# Patient Record
Sex: MALE | Race: WHITE | Employment: OTHER | URBAN - METROPOLITAN AREA
[De-identification: names, ages, dates, MRNs, and addresses within clinical notes are randomized per-mention and may not be internally consistent; named-entity substitution may affect disease eponyms.]

---

## 2017-03-16 ENCOUNTER — TELEPHONE (OUTPATIENT)
Dept: PULMONOLOGY | Age: 57
End: 2017-03-16

## 2017-03-16 ENCOUNTER — OFFICE VISIT (OUTPATIENT)
Dept: PULMONOLOGY | Age: 57
End: 2017-03-16

## 2017-03-16 ENCOUNTER — HOSPITAL ENCOUNTER (OUTPATIENT)
Dept: OTHER | Age: 57
Discharge: HOME | End: 2017-03-16
Attending: INTERNAL MEDICINE | Admitting: INTERNAL MEDICINE

## 2017-03-16 VITALS
WEIGHT: 238 LBS | RESPIRATION RATE: 16 BRPM | SYSTOLIC BLOOD PRESSURE: 110 MMHG | BODY MASS INDEX: 34.07 KG/M2 | OXYGEN SATURATION: 98 % | HEART RATE: 78 BPM | TEMPERATURE: 98 F | DIASTOLIC BLOOD PRESSURE: 62 MMHG | HEIGHT: 70 IN

## 2017-03-16 DIAGNOSIS — Z22.7 TB LUNG, LATENT: ICD-10-CM

## 2017-03-16 DIAGNOSIS — J86.9 EMPYEMA (HCC): ICD-10-CM

## 2017-03-16 DIAGNOSIS — J90 PLEURAL EFFUSION: ICD-10-CM

## 2017-03-16 DIAGNOSIS — I27.20 PULMONARY HTN (HCC): ICD-10-CM

## 2017-03-16 DIAGNOSIS — R93.89 ABNORMAL CXR: Primary | ICD-10-CM

## 2017-03-16 PROCEDURE — 99214 OFFICE O/P EST MOD 30 MIN: CPT | Performed by: INTERNAL MEDICINE

## 2017-03-16 PROCEDURE — 3017F COLORECTAL CA SCREEN DOC REV: CPT | Performed by: INTERNAL MEDICINE

## 2017-03-16 PROCEDURE — G8427 DOCREV CUR MEDS BY ELIG CLIN: HCPCS | Performed by: INTERNAL MEDICINE

## 2017-03-16 PROCEDURE — 1036F TOBACCO NON-USER: CPT | Performed by: INTERNAL MEDICINE

## 2017-03-16 PROCEDURE — G8484 FLU IMMUNIZE NO ADMIN: HCPCS | Performed by: INTERNAL MEDICINE

## 2017-03-16 PROCEDURE — G8417 CALC BMI ABV UP PARAM F/U: HCPCS | Performed by: INTERNAL MEDICINE

## 2017-04-04 ENCOUNTER — TELEPHONE (OUTPATIENT)
Dept: PULMONOLOGY | Age: 57
End: 2017-04-04

## 2017-04-04 DIAGNOSIS — J86.9 EMPYEMA OF RIGHT PLEURAL SPACE (HCC): Primary | ICD-10-CM

## 2017-04-05 ENCOUNTER — TELEPHONE (OUTPATIENT)
Dept: PULMONOLOGY | Age: 57
End: 2017-04-05

## 2017-04-05 ENCOUNTER — HOSPITAL ENCOUNTER (OUTPATIENT)
Dept: PULMONOLOGY | Age: 57
Discharge: HOME | End: 2017-04-05
Attending: INTERNAL MEDICINE | Admitting: INTERNAL MEDICINE

## 2017-04-05 DIAGNOSIS — J86.9 PYOTHORAX WITHOUT FISTULA (HCC): ICD-10-CM

## 2017-04-06 ENCOUNTER — TELEPHONE (OUTPATIENT)
Dept: PULMONOLOGY | Age: 57
End: 2017-04-06

## 2017-04-06 DIAGNOSIS — R09.02 HYPOXIA: Primary | ICD-10-CM

## 2017-06-27 ENCOUNTER — TELEPHONE (OUTPATIENT)
Dept: PULMONOLOGY | Age: 57
End: 2017-06-27

## 2017-10-12 ENCOUNTER — TELEPHONE (OUTPATIENT)
Dept: PULMONOLOGY | Age: 57
End: 2017-10-12

## 2017-10-12 NOTE — TELEPHONE ENCOUNTER
Patient did not show for 3 month fua  with  on 10/12/17      Patient was also no show on: 6/9/16 and 2/4/16    LOV:   Assessment:3/16/17       · Abnormal CXR- favor atelectasis and pleuroparenchymal changes related to prior infection  · MSSA Empyema required chest tube 2/13/15-2/25/2015 and DNase/tPA complicated by Hemothorax and mucus plug lung collapse. Recurrence required CT placement 3/19/2015. Declined for decortication by CT surgery. Completed 6 months of Abx 4/2016. Improved radiographically and clinically. · Left sided effusion on CT chest 12/13/2015. Thoracentesis 12/28/2015 500 cc. Negative Cx P 2.5 LD 62 57% Neutrophils. Negative cytology and flow cytometry. Likely was volume overlaod  · Latent TB- Completed 4 drugs TB treatment 1/2/2013 with the thought that the pleural effusion is TB effusion. · Recurrent lymphocytic right pleural effusion. Completed 4 drug TB treatment. VATS pleurodesis with pleural biopsy 2/18/13 negative for malignancy, dense fibrosis with chronic inflammation  · ESRD on HD via right arm fistula. · Moderate MR, mild to moderate TR  · Atrial fibrillation  · Pulmonary hypertension.                            Plan:       · CXR PA and lateral today  · 6MW  · ONPO    · Albuterol 2 puffs Q4-6 hrs PRN

## 2017-11-04 ENCOUNTER — HOSPITAL ENCOUNTER (OUTPATIENT)
Dept: OTHER | Age: 57
Discharge: HOME | End: 2017-11-04
Attending: NURSE PRACTITIONER | Admitting: NURSE PRACTITIONER

## 2017-11-04 DIAGNOSIS — R05.9 COUGH: ICD-10-CM

## 2017-11-27 ENCOUNTER — HOSPITAL ENCOUNTER (OUTPATIENT)
Dept: OTHER | Age: 57
Discharge: HOME | End: 2017-11-27
Attending: NURSE PRACTITIONER | Admitting: NURSE PRACTITIONER

## 2017-11-27 DIAGNOSIS — R09.89 ABNORMAL LUNG SOUNDS: ICD-10-CM

## 2017-12-30 PROBLEM — I48.91 A-FIB (HCC): Status: ACTIVE | Noted: 2017-12-30

## 2017-12-30 PROBLEM — E11.9 DM (DIABETES MELLITUS) (HCC): Status: ACTIVE | Noted: 2017-12-30

## 2018-01-01 PROBLEM — I48.21 PERMANENT ATRIAL FIBRILLATION (HCC): Status: ACTIVE | Noted: 2017-12-30

## 2018-01-02 ENCOUNTER — TELEPHONE (OUTPATIENT)
Dept: PULMONOLOGY | Age: 58
End: 2018-01-02

## 2018-01-05 ENCOUNTER — TELEPHONE (OUTPATIENT)
Dept: CARDIOLOGY CLINIC | Age: 58
End: 2018-01-05

## 2018-01-05 RX ORDER — LIDOCAINE HYDROCHLORIDE 10 MG/ML
1 INJECTION, SOLUTION EPIDURAL; INFILTRATION; INTRACAUDAL; PERINEURAL
Status: CANCELLED | OUTPATIENT
Start: 2018-01-05 | End: 2018-01-05

## 2018-01-05 RX ORDER — SODIUM CHLORIDE, SODIUM LACTATE, POTASSIUM CHLORIDE, CALCIUM CHLORIDE 600; 310; 30; 20 MG/100ML; MG/100ML; MG/100ML; MG/100ML
INJECTION, SOLUTION INTRAVENOUS CONTINUOUS
Status: CANCELLED | OUTPATIENT
Start: 2018-01-05

## 2018-01-05 NOTE — TELEPHONE ENCOUNTER
Patient is scheduled with Dr. Pito Gaitan for SWETHA w Anesthesia Jim Griggs) [place] [facility] 9:30am to the Cath Lab. Please have patient arrive to the main entrance of [facility] and check in with the registration desk. Medications ok per NPBB. Confirmed transportation with Zach Sharp for patient from [facility] to [facility].

## 2018-01-08 ENCOUNTER — HOSPITAL ENCOUNTER (OUTPATIENT)
Dept: SURGERY | Age: 58
Discharge: HOME | End: 2018-01-09

## 2018-01-22 ENCOUNTER — TELEPHONE (OUTPATIENT)
Dept: CARDIOLOGY | Age: 58
End: 2018-01-22

## 2018-01-26 ENCOUNTER — OFFICE VISIT (OUTPATIENT)
Dept: CARDIOLOGY CLINIC | Age: 58
End: 2018-01-26

## 2018-01-26 VITALS
SYSTOLIC BLOOD PRESSURE: 110 MMHG | HEIGHT: 70 IN | WEIGHT: 239 LBS | HEART RATE: 104 BPM | OXYGEN SATURATION: 96 % | BODY MASS INDEX: 34.22 KG/M2 | DIASTOLIC BLOOD PRESSURE: 60 MMHG

## 2018-01-26 DIAGNOSIS — Z79.01 LONG TERM CURRENT USE OF ANTICOAGULANT: ICD-10-CM

## 2018-01-26 DIAGNOSIS — H54.50 BLINDNESS OF LEFT EYE WITH LOW VISION IN CONTRALATERAL EYE: ICD-10-CM

## 2018-01-26 DIAGNOSIS — H54.40 BLINDNESS OF LEFT EYE WITH LOW VISION IN CONTRALATERAL EYE: ICD-10-CM

## 2018-01-26 DIAGNOSIS — N18.6 ESRD (END STAGE RENAL DISEASE) (HCC): ICD-10-CM

## 2018-01-26 DIAGNOSIS — I48.3 TYPICAL ATRIAL FLUTTER (HCC): Primary | ICD-10-CM

## 2018-01-26 DIAGNOSIS — I48.21 PERMANENT ATRIAL FIBRILLATION (HCC): ICD-10-CM

## 2018-01-26 PROCEDURE — G8482 FLU IMMUNIZE ORDER/ADMIN: HCPCS | Performed by: INTERNAL MEDICINE

## 2018-01-26 PROCEDURE — G8417 CALC BMI ABV UP PARAM F/U: HCPCS | Performed by: INTERNAL MEDICINE

## 2018-01-26 PROCEDURE — G8427 DOCREV CUR MEDS BY ELIG CLIN: HCPCS | Performed by: INTERNAL MEDICINE

## 2018-01-26 PROCEDURE — 99215 OFFICE O/P EST HI 40 MIN: CPT | Performed by: INTERNAL MEDICINE

## 2018-01-26 PROCEDURE — 3017F COLORECTAL CA SCREEN DOC REV: CPT | Performed by: INTERNAL MEDICINE

## 2018-01-26 RX ORDER — ACETAMINOPHEN 325 MG/1
TABLET ORAL EVERY 6 HOURS PRN
Status: ON HOLD | COMMUNITY
End: 2018-06-06 | Stop reason: ALTCHOICE

## 2018-01-26 RX ORDER — BENZONATATE 100 MG/1
100 CAPSULE ORAL 3 TIMES DAILY PRN
Status: ON HOLD | COMMUNITY
End: 2018-06-06 | Stop reason: ALTCHOICE

## 2018-01-29 ENCOUNTER — TELEPHONE (OUTPATIENT)
Dept: CARDIOLOGY CLINIC | Age: 58
End: 2018-01-29

## 2018-01-30 NOTE — TELEPHONE ENCOUNTER
I just did a second opinion consult for jason. He is not my patient and sorry I cant take other physicians patient's for INR monitoring.

## 2018-02-01 DIAGNOSIS — I48.19 PERSISTENT ATRIAL FIBRILLATION (HCC): Primary | ICD-10-CM

## 2018-02-02 LAB — INR BLD: 1.4

## 2018-02-06 ENCOUNTER — ANTI-COAG VISIT (OUTPATIENT)
Dept: CARDIOLOGY CLINIC | Age: 58
End: 2018-02-06

## 2018-02-06 ENCOUNTER — TELEPHONE (OUTPATIENT)
Dept: CARDIOLOGY CLINIC | Age: 58
End: 2018-02-06

## 2018-02-06 NOTE — TELEPHONE ENCOUNTER
Spoke to [facility]. 02/06/18: INR is 1.6. Per protocol, dosing change is 7.5 mg Tue/Thu/Sat. Recheck Fri 02/09. HHN RN VU.  Provider notified. ~ Kevin Iraheta RN

## 2018-02-08 ENCOUNTER — TELEPHONE (OUTPATIENT)
Dept: CARDIOLOGY CLINIC | Age: 58
End: 2018-02-08

## 2018-02-08 NOTE — TELEPHONE ENCOUNTER
I have already clarified his meds with Ankur Nazario. The patients BP was WNL. His HR was 144 due to the patient not taking his diltiazem or other meds this AM. His girlfriend is helping him with his medications daily instead of HHN. Chase Zuñiga Everything else has been clarified. Ankur Nazario RN had the wrong information.

## 2018-02-08 NOTE — TELEPHONE ENCOUNTER
Occupational therapist Chiara Elena called states pt's HR has been 130-140s in afib. According to previous notes he did not take his diltiazem this AM prior to dialysis. Pt confirmed he took dilt at 11:00AM after dialysis. Pt denies symptoms. States he has been taking warfarin without missing.

## 2018-02-08 NOTE — TELEPHONE ENCOUNTER
Recalled pt and spoke to girlfriend. She states the patient is taking carvedilol 25 mg twice daily. Asked what pill she was cutting in half. She states \"the other C pill\". Asked her to clarify the name of the medication she was cutting in half. She confirmed she has to cut coumadin in half sometimes. Informed her to make sure patient is only take one tablet of carvedilol in the AM and one tablet in the PM. Girlfriend verbalized understanding. Reviewed the rest of the medications again to be sure nothing was missed.

## 2018-02-12 ENCOUNTER — TELEPHONE (OUTPATIENT)
Dept: CARDIOLOGY CLINIC | Age: 58
End: 2018-02-12

## 2018-02-12 ENCOUNTER — ANTI-COAG VISIT (OUTPATIENT)
Dept: CARDIOLOGY CLINIC | Age: 58
End: 2018-02-12

## 2018-02-12 LAB — INR BLD: 2

## 2018-02-13 ENCOUNTER — TELEPHONE (OUTPATIENT)
Dept: CARDIOLOGY CLINIC | Age: 58
End: 2018-02-13

## 2018-02-13 NOTE — TELEPHONE ENCOUNTER
Therapist says pt's BP is 88/69 w/hr in 130's. Pt has been this way for 2 weeks. Please call to advise.  Call sent to RN [facility]
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
## 2018-02-16 ENCOUNTER — TELEPHONE (OUTPATIENT)
Dept: CARDIOLOGY CLINIC | Age: 58
End: 2018-02-16

## 2018-02-16 NOTE — TELEPHONE ENCOUNTER
OT kristit states pt has high HR today resting at 112, w/activity it's in the 130's. Please return her call. She is asking what his \"normal\" perimeters are. She knows he has a procedure next week. She just doesn't know if she should keep reporting this or not.

## 2018-02-19 ENCOUNTER — ANTI-COAG VISIT (OUTPATIENT)
Dept: CARDIOLOGY CLINIC | Age: 58
End: 2018-02-19

## 2018-02-19 ENCOUNTER — TELEPHONE (OUTPATIENT)
Dept: CARDIOLOGY CLINIC | Age: 58
End: 2018-02-19

## 2018-02-19 LAB — INR BLD: 4.9

## 2018-02-19 RX ORDER — METOPROLOL SUCCINATE 100 MG/1
100 TABLET, EXTENDED RELEASE ORAL DAILY
Qty: 30 TABLET | Refills: 3 | Status: ON HOLD | OUTPATIENT
Start: 2018-02-19 | End: 2018-02-26 | Stop reason: HOSPADM

## 2018-02-19 NOTE — TELEPHONE ENCOUNTER
Please clarify dosing. On 02/13/18, you increased cardizem to 120 mg twice daily. HRs still in the 110s-130s. BP low 90s/60s.

## 2018-02-21 ENCOUNTER — HOSPITAL ENCOUNTER (OUTPATIENT)
Dept: OTHER | Age: 58
Discharge: HOME | End: 2018-02-22
Attending: INTERNAL MEDICINE | Admitting: INTERNAL MEDICINE

## 2018-02-21 PROBLEM — Z99.2 HEMODIALYSIS ACCESS SITE WITH ARTERIOVENOUS GRAFT (HCC): Status: ACTIVE | Noted: 2018-02-21

## 2018-02-21 PROBLEM — I48.91 ATRIAL FIBRILLATION (HCC): Status: ACTIVE | Noted: 2018-02-21

## 2018-02-28 ENCOUNTER — HOSPITAL ENCOUNTER (OUTPATIENT)
Dept: OTHER | Age: 58
Discharge: HOME | End: 2018-02-28
Attending: INTERNAL MEDICINE | Admitting: INTERNAL MEDICINE

## 2018-03-12 ENCOUNTER — TELEPHONE (OUTPATIENT)
Dept: CARDIOLOGY CLINIC | Age: 58
End: 2018-03-12

## 2018-03-12 NOTE — TELEPHONE ENCOUNTER
Michael Nick at [facility] calling to get Rx for adjusted dosage for M[place]ouin for pt. Pt told her he is taking Cartia 120mg twice daily now instead of once. Please call Denise to advise.

## 2018-03-12 NOTE — TELEPHONE ENCOUNTER
Spoke to girlfriend at length. She states he has been bleeding through his dialysis catheter \"some\" but she got the bleeding to stop. She states a HHN will be out today. Informed her an INR MUST be done today. Will also refer pt to the [facility] for better management. N has NOT been calling INRs into this office for management since 02/19.  Pt VU of coumadin clinic referral.

## 2018-03-12 NOTE — TELEPHONE ENCOUNTER
Called juanjose, explained that cartia is not on his med list and looks like it was discontinued at discharge. Called the patient he doesn't know what he is suppose to be taking he just finished what he had at the house. Called [facility] they state they d/c and don't keep records once the patient is sent home. His appt with [facility] is not until 3/27/18. Should med be refilled or wait until seen?

## 2018-03-13 ENCOUNTER — ANTI-COAG VISIT (OUTPATIENT)
Dept: CARDIOLOGY CLINIC | Age: 58
End: 2018-03-13

## 2018-03-13 ENCOUNTER — TELEPHONE (OUTPATIENT)
Dept: PHARMACY | Facility: CLINIC | Age: 58
End: 2018-03-13

## 2018-03-13 ENCOUNTER — TELEPHONE (OUTPATIENT)
Dept: CARDIOLOGY CLINIC | Age: 58
End: 2018-03-13

## 2018-03-13 LAB — INR BLD: 1.8

## 2018-04-06 ENCOUNTER — TELEPHONE (OUTPATIENT)
Dept: CARDIOLOGY CLINIC | Age: 58
End: 2018-04-06

## 2018-04-09 ENCOUNTER — ANTI-COAG VISIT (OUTPATIENT)
Dept: PHARMACY | Facility: CLINIC | Age: 58
End: 2018-04-09

## 2018-04-09 ENCOUNTER — HOSPITAL ENCOUNTER (OUTPATIENT)
Dept: OTHER | Age: 58
Discharge: HOME | End: 2018-04-30
Attending: INTERNAL MEDICINE | Admitting: INTERNAL MEDICINE

## 2018-04-09 ENCOUNTER — TELEPHONE (OUTPATIENT)
Dept: CARDIOLOGY CLINIC | Age: 58
End: 2018-04-09

## 2018-04-09 LAB — INR BLD: 1.5

## 2018-04-12 ENCOUNTER — ANTI-COAG VISIT (OUTPATIENT)
Dept: PHARMACY | Facility: CLINIC | Age: 58
End: 2018-04-12

## 2018-04-12 LAB — INR BLD: 1.9

## 2018-04-13 ENCOUNTER — HOSPITAL ENCOUNTER (OUTPATIENT)
Dept: SURGERY | Age: 58
Discharge: HOME | End: 2018-05-02

## 2018-04-19 ENCOUNTER — ANTI-COAG VISIT (OUTPATIENT)
Dept: PHARMACY | Facility: CLINIC | Age: 58
End: 2018-04-19

## 2018-04-19 LAB — INR BLD: 1.3

## 2018-05-01 ENCOUNTER — HOSPITAL ENCOUNTER (OUTPATIENT)
Dept: OTHER | Age: 58
Discharge: HOME | End: 2018-05-31
Attending: INTERNAL MEDICINE | Admitting: INTERNAL MEDICINE

## 2018-05-14 RX ORDER — CLOPIDOGREL BISULFATE 75 MG/1
TABLET ORAL
Qty: 90 TABLET | Refills: 3 | Status: SHIPPED | OUTPATIENT
Start: 2018-05-14 | End: 2018-09-18 | Stop reason: SDUPTHER

## 2018-06-05 PROBLEM — I50.9 CHF (CONGESTIVE HEART FAILURE) (HCC): Status: ACTIVE | Noted: 2018-06-05

## 2018-06-05 PROBLEM — J96.00 ACUTE RESPIRATORY FAILURE (HCC): Status: ACTIVE | Noted: 2018-06-05

## 2018-06-09 PROBLEM — I42.9 CARDIOMYOPATHY (HCC): Status: ACTIVE | Noted: 2018-06-09

## 2018-06-09 PROBLEM — R29.818 SUSPECTED SLEEP APNEA: Status: ACTIVE | Noted: 2018-06-09

## 2018-06-09 PROBLEM — J90 PLEURAL EFFUSION, BILATERAL: Status: ACTIVE | Noted: 2018-06-09

## 2018-06-09 PROBLEM — I34.0 NON-RHEUMATIC MITRAL REGURGITATION: Status: ACTIVE | Noted: 2018-06-09

## 2018-07-24 ENCOUNTER — HOSPITAL ENCOUNTER (EMERGENCY)
Age: 58
Discharge: HOME | End: 2018-07-24
Attending: EMERGENCY MEDICINE
Payer: MEDICARE

## 2018-07-24 ENCOUNTER — APPOINTMENT (OUTPATIENT)
Dept: GENERAL RADIOLOGY | Age: 58
End: 2018-07-24
Payer: MEDICARE

## 2018-07-24 VITALS
OXYGEN SATURATION: 96 % | HEART RATE: 79 BPM | WEIGHT: 223 LBS | HEIGHT: 71 IN | BODY MASS INDEX: 31.22 KG/M2 | DIASTOLIC BLOOD PRESSURE: 84 MMHG | RESPIRATION RATE: 16 BRPM | SYSTOLIC BLOOD PRESSURE: 150 MMHG | TEMPERATURE: 98.6 F

## 2018-07-24 DIAGNOSIS — J44.9 CHRONIC OBSTRUCTIVE PULMONARY DISEASE, UNSPECIFIED COPD TYPE (HCC): Primary | ICD-10-CM

## 2018-07-24 PROCEDURE — 99281 EMR DPT VST MAYX REQ PHY/QHP: CPT

## 2018-07-24 PROCEDURE — 71046 X-RAY EXAM CHEST 2 VIEWS: CPT

## 2018-07-24 NOTE — ED NOTES
Discharge: Pt discharged to home as per order. Instructions given. Pt verbalized understanding. Denied questions.        Teresa Lima RN  07/24/18 [id number]
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
## 2018-07-31 ENCOUNTER — TELEPHONE (OUTPATIENT)
Dept: CARDIOLOGY CLINIC | Age: 58
End: 2018-07-31

## 2018-08-01 ENCOUNTER — OFFICE VISIT (OUTPATIENT)
Dept: CARDIOLOGY CLINIC | Age: 58
End: 2018-08-01

## 2018-08-01 VITALS
HEIGHT: 71 IN | HEART RATE: 68 BPM | WEIGHT: 225 LBS | SYSTOLIC BLOOD PRESSURE: 158 MMHG | DIASTOLIC BLOOD PRESSURE: 90 MMHG | BODY MASS INDEX: 31.5 KG/M2

## 2018-08-01 DIAGNOSIS — I50.32 CHRONIC DIASTOLIC CONGESTIVE HEART FAILURE (HCC): ICD-10-CM

## 2018-08-01 DIAGNOSIS — I48.3 TYPICAL ATRIAL FLUTTER (HCC): ICD-10-CM

## 2018-08-01 DIAGNOSIS — I48.0 PAROXYSMAL ATRIAL FIBRILLATION (HCC): Primary | ICD-10-CM

## 2018-08-01 DIAGNOSIS — I15.0 RENOVASCULAR HYPERTENSION: ICD-10-CM

## 2018-08-01 PROCEDURE — 3017F COLORECTAL CA SCREEN DOC REV: CPT | Performed by: NURSE PRACTITIONER

## 2018-08-01 PROCEDURE — 99214 OFFICE O/P EST MOD 30 MIN: CPT | Performed by: NURSE PRACTITIONER

## 2018-08-01 PROCEDURE — G8417 CALC BMI ABV UP PARAM F/U: HCPCS | Performed by: NURSE PRACTITIONER

## 2018-08-01 PROCEDURE — 1036F TOBACCO NON-USER: CPT | Performed by: NURSE PRACTITIONER

## 2018-08-01 PROCEDURE — 93000 ELECTROCARDIOGRAM COMPLETE: CPT | Performed by: NURSE PRACTITIONER

## 2018-08-01 PROCEDURE — G8427 DOCREV CUR MEDS BY ELIG CLIN: HCPCS | Performed by: NURSE PRACTITIONER

## 2018-08-01 RX ORDER — LORATADINE 10 MG/1
10 TABLET ORAL DAILY
COMMUNITY
End: 2020-03-15 | Stop reason: ALTCHOICE

## 2018-08-01 NOTE — PROGRESS NOTES
(02/21/2018). Home Medications:    Prior to Admission medications    Medication Sig Start Date End Date Taking? Authorizing Provider   docusate sodium (COLACE, DULCOLAX) 100 MG CAPS Take 100 mg by mouth 2 times daily 6/15/18   Stephen Randall MD   b complex-C-folic acid (NEPHROCAPS) 1 MG capsule Take 1 capsule by mouth daily 6/16/18   Stephen Randall MD   amiodarone (PACERONE) 100 MG tablet Take 1 tablet by mouth daily 6/16/18   Stephen Randall MD   citalopram (CELEXA) 40 MG tablet Take 1 tablet by mouth daily 6/16/18   Stephen Randall MD   cetirizine (ZYRTEC) 5 MG tablet Take 1 tablet by mouth daily 6/16/18   Stephen Randall MD   clopidogrel (PLAVIX) 75 MG tablet TAKE 1 TABLET BY MOUTH EVERY DAY 5/14/18   Marimar Estrella MD   aspirin EC 81 MG EC tablet Take 1 tablet by mouth daily 2/22/18   LASHAUN Martines - CNP   albuterol sulfate HFA (PROVENTIL HFA) 108 (90 BASE) MCG/ACT inhaler Inhale 2 puffs into the lungs every 4 hours as needed for Wheezing 12/23/16   De Salmon MD   OXYGEN Inhale 2 L into the lungs continuous With concentrator and portability 2/23/16   Vanessa Cortez MD   traZODone (DESYREL) 150 MG tablet Take 150 mg by mouth nightly. Historical Provider, MD   sevelamer (RENVELA) 800 MG tablet Take 4 tablets by mouth 3 times daily     Historical Provider, MD       Allergies:  Bee pollen and Codeine    Social History:   reports that he quit smoking about 41 years ago. His smoking use included Cigarettes. He has a 4.50 pack-year smoking history. He has never used smokeless tobacco. He reports that he does not drink alcohol or use drugs. Family History: family history includes Cancer in his mother and sister; Diabetes in his father. Review of Systems   Constitutional: Negative. HENT: Negative. Eyes: Negative. Respiratory: + chronic SOB, on and off home O2  Cardiovascular: see HPI  Gastrointestinal: Negative. Genitourinary: Negative. Musculoskeletal: Negative. CREATININE, LABGLOM, GLUCOSE in the last 72 hours. PT/INR: No results for input(s): PROTIME, INR in the last 72 hours. APTT:No results for input(s): APTT in the last 72 hours. FASTING LIPID PANEL:  Lab Results   Component Value Date    HDL 38 10/03/2012    HDL 34 09/17/2010    LDLCALC 134 10/03/2012    TRIG 173 10/03/2012     LIVER PROFILE:No results for input(s): AST, ALT, ALB in the last 72 hours. Assessment:   1. Paroxysmal atrial fibrillation and atrial flutter    -on ASA and Plavix post Watchman implant (2/2018)   -on amiodarone   2. HTN: BP elevated today, historically labile  3. Chronic diastolic CHF: compensated  4. Pulmonary HTN  5. HLD  6. ESRD: on HD  7. Chronic anemia  8. DM    Plan:   1. Continue amiodarone, Plavix, and ASA  2. TSH and LFT every 6 months while on amiodarone (due now)  3. SWETHA in 8/2018 per Dr. Ashley Acevedo recommendations. Will stop Plavix depending on SWETHA results (risks, benefits, and alternative therapy discussed). 4. Follow up after procedure    Will obtain TSH and LFT when admitted for SWETHA.      Thaddeus Lemus, APRN-CNP  [address]  [phone]

## 2018-08-06 ENCOUNTER — TELEPHONE (OUTPATIENT)
Dept: CARDIOLOGY CLINIC | Age: 58
End: 2018-08-06

## 2018-08-06 NOTE — COMMUNICATION BODY
(02/21/2018). Home Medications:    Prior to Admission medications    Medication Sig Start Date End Date Taking? Authorizing Provider   docusate sodium (COLACE, DULCOLAX) 100 MG CAPS Take 100 mg by mouth 2 times daily 6/15/18   Roslyn Vasquez MD   b complex-C-folic acid (NEPHROCAPS) 1 MG capsule Take 1 capsule by mouth daily 6/16/18   Roslyn Vasquez MD   amiodarone (PACERONE) 100 MG tablet Take 1 tablet by mouth daily 6/16/18   Roslyn Vasquez MD   citalopram (CELEXA) 40 MG tablet Take 1 tablet by mouth daily 6/16/18   Roslyn Vasquez MD   cetirizine (ZYRTEC) 5 MG tablet Take 1 tablet by mouth daily 6/16/18   Roslyn Vasquez MD   clopidogrel (PLAVIX) 75 MG tablet TAKE 1 TABLET BY MOUTH EVERY DAY 5/14/18   Shahzad Thakur MD   aspirin EC 81 MG EC tablet Take 1 tablet by mouth daily 2/22/18   LASHAUN Holt - MERE   albuterol sulfate HFA (PROVENTIL HFA) 108 (90 BASE) MCG/ACT inhaler Inhale 2 puffs into the lungs every 4 hours as needed for Wheezing 12/23/16   Coco López MD   OXYGEN Inhale 2 L into the lungs continuous With concentrator and portability 2/23/16   Clementina Pittman MD   traZODone (DESYREL) 150 MG tablet Take 150 mg by mouth nightly. Historical Provider, MD   sevelamer (RENVELA) 800 MG tablet Take 4 tablets by mouth 3 times daily     Historical Provider, MD       Allergies:  Bee pollen and Codeine    Social History:   reports that he quit smoking about 41 years ago. His smoking use included Cigarettes. He has a 4.50 pack-year smoking history. He has never used smokeless tobacco. He reports that he does not drink alcohol or use drugs. Family History: family history includes Cancer in his mother and sister; Diabetes in his father. Review of Systems   Constitutional: Negative. HENT: Negative. Eyes: Negative. Respiratory: + chronic SOB, on and off home O2  Cardiovascular: see HPI  Gastrointestinal: Negative. Genitourinary: Negative. Musculoskeletal: Negative. Skin: Negative. Neurological: Negative. Hematological: Negative. Psychiatric/Behavioral: Negative. PHYSICAL EXAM:    Physical Examination:    BP (!) 158/90   Pulse 68   Ht 5' 11\" (1.803 m)   Wt 225 lb (102.1 kg)   BMI 31.38 kg/m²       Constitutional and general appearance: fatigued, cooperative, no distress and appears older than stated age  [de-identified]: PERRL, no cervical lymphadenopathy. No masses palpable. Normal oral mucosa  Respiratory:  · Normal excursion and expansion without use of accessory muscles  · Resp auscultation: Normal breath sounds without dullness or wheezing  Cardiovascular:  · The apical impulse is not displaced  · Heart tones are crisp and normal. regular S1 and S2.  · Jugular venous pulsation Normal  · The carotid upstroke is normal in amplitude and contour without delay or bruit  · Peripheral pulses are symmetrical and full   Abdomen:  · No masses or tenderness  · Bowel sounds present  Extremities:  ·  No cyanosis or clubbing  ·  No lower extremity edema  ·  Skin: warm and dry  Neurological:  · Alert and oriented  · Moves all extremities well  · No abnormalities of mood, affect, memory, mentation, or behavior are noted    DATA:    ECG 8/1/2018:  SR HR 78    SWETHA 4/13/2018:  Ejection fraction is visually estimated to be 45-50 %.   Mild mitral regurgitation.   Moderately dilated left atrium.   Watchman device in place. No leak around the watchman device   Moderate-to-severe tricuspid regurgitation.   The right and left atriums are moderately dilated. Stress test 2/17/2016:  IMPRESSION:   1. No pharmacologically induced reversible perfusion defects to suggest   ischemia.  Stable fixed perfusion defect within the inferior wall either   represents old infarct or diaphragmatic attenuation -unchanged from 2012.   2. Ejection fraction of 55%       CARDIOLOGY LABS:   CBC: No results for input(s): WBC, HGB, HCT, PLT in the last 72 hours.   BMP: No results for input(s): NA, K, CO2, BUN,

## 2018-08-06 NOTE — TELEPHONE ENCOUNTER
Spoke with family regarding dates for SWETHA for patient. They are going to go over dates and see which one works best. They will call me back.

## 2018-08-13 ENCOUNTER — OFFICE VISIT (OUTPATIENT)
Dept: PULMONOLOGY | Age: 58
End: 2018-08-13

## 2018-08-13 VITALS
WEIGHT: 222.4 LBS | RESPIRATION RATE: 20 BRPM | BODY MASS INDEX: 31.84 KG/M2 | TEMPERATURE: 97.8 F | HEART RATE: 78 BPM | DIASTOLIC BLOOD PRESSURE: 68 MMHG | SYSTOLIC BLOOD PRESSURE: 152 MMHG | HEIGHT: 70 IN | OXYGEN SATURATION: 96 %

## 2018-08-13 DIAGNOSIS — I48.91 ATRIAL FIBRILLATION, UNSPECIFIED TYPE (HCC): ICD-10-CM

## 2018-08-13 DIAGNOSIS — R93.89 ABNORMAL CT OF THE CHEST: ICD-10-CM

## 2018-08-13 DIAGNOSIS — R06.00 DYSPNEA, UNSPECIFIED TYPE: Primary | ICD-10-CM

## 2018-08-13 DIAGNOSIS — I27.20 PULMONARY HTN (HCC): ICD-10-CM

## 2018-08-13 PROCEDURE — G8417 CALC BMI ABV UP PARAM F/U: HCPCS | Performed by: INTERNAL MEDICINE

## 2018-08-13 PROCEDURE — 99214 OFFICE O/P EST MOD 30 MIN: CPT | Performed by: INTERNAL MEDICINE

## 2018-08-13 PROCEDURE — 1036F TOBACCO NON-USER: CPT | Performed by: INTERNAL MEDICINE

## 2018-08-13 PROCEDURE — G8427 DOCREV CUR MEDS BY ELIG CLIN: HCPCS | Performed by: INTERNAL MEDICINE

## 2018-08-13 PROCEDURE — 3017F COLORECTAL CA SCREEN DOC REV: CPT | Performed by: INTERNAL MEDICINE

## 2018-08-13 RX ORDER — IPRATROPIUM BROMIDE AND ALBUTEROL SULFATE 2.5; .5 MG/3ML; MG/3ML
3 SOLUTION RESPIRATORY (INHALATION) EVERY 6 HOURS PRN
Qty: 360 ML | Refills: 6 | Status: SHIPPED | OUTPATIENT
Start: 2018-08-13

## 2018-08-13 NOTE — PROGRESS NOTES
No joint deformity. No clubbing. Right arm fistula. Neuro: Awake. Alert. Moves all four extremities. Psych: Oriented x 3. No anxiety. DATA reviewed by me:     CTPA 6/5/18 imaging reviewed by me and showed  Negative PE  Bilateral small to moderate pleural effusion  Large basilar round atelectasis      Chest ultrasound 6/15/18 small amount of pleural effusion poorly visualized    Chest x-ray 7/24/18 imaging reviewed by me and showed bilateral pleural effusion with basilar airspace disease      Echo 4/13/2018    Ejection fraction is visually estimated to be 45-50 %.   Mild mitral regurgitation.   Moderately dilated left atrium.   Watchman device in place. No leak around the watchman device   Moderate-to-severe tricuspid regurgitation.   The right and left atriums are moderately dilated. Assessment:       · Dyspnea- Multi-factorial secondary to underlying parenchymal lung disease, CAD/cardiomyopathy, deconditioning and obesity   · Abnormal CT chest 6/5/2018- favor rounded atelectasis and pleuroparenchymal changes related to prior infection  · MSSA Empyema required chest tube 2/13/15-2/25/2015 and DNase/tPA complicated by Hemothorax and mucus plug lung collapse. Recurrence required CT placement 3/19/2015. Declined for decortication by CT surgery. Completed 6 months of Abx 4/2016. · Left sided effusion on CT chest 12/13/2015. Thoracentesis 12/28/2015 500 cc. Negative Cx P 2.5 LD 62 57% Neutrophils. Negative cytology and flow cytometry. Likely was volume overlaod  · Latent TB- Completed 4 drugs TB treatment 1/2/2013 with the thought that the pleural effusion is TB effusion. · Recurrent lymphocytic right pleural effusion. Completed 4 drug TB treatment.  VATS pleurodesis with pleural biopsy 2/18/13 negative for malignancy, dense fibrosis with chronic inflammation  · ESRD on HD via right arm fistula  · Moderate MR, mild to moderate TR  · Pulmonary hypertension/CAD/cardiomyopathy and Atrial fibrillation on

## 2018-09-11 ENCOUNTER — APPOINTMENT (OUTPATIENT)
Dept: GENERAL RADIOLOGY | Age: 58
End: 2018-09-11
Payer: MEDICARE

## 2018-09-11 ENCOUNTER — HOSPITAL ENCOUNTER (EMERGENCY)
Age: 58
Discharge: HOME | End: 2018-09-11
Attending: EMERGENCY MEDICINE
Payer: MEDICARE

## 2018-09-11 VITALS
SYSTOLIC BLOOD PRESSURE: 156 MMHG | HEART RATE: 96 BPM | HEIGHT: 70 IN | WEIGHT: 211 LBS | TEMPERATURE: 97.8 F | BODY MASS INDEX: 30.21 KG/M2 | RESPIRATION RATE: 26 BRPM | DIASTOLIC BLOOD PRESSURE: 84 MMHG | OXYGEN SATURATION: 95 %

## 2018-09-11 DIAGNOSIS — I10 ESSENTIAL HYPERTENSION: ICD-10-CM

## 2018-09-11 DIAGNOSIS — Z99.2 ESRD ON HEMODIALYSIS (HCC): ICD-10-CM

## 2018-09-11 DIAGNOSIS — R06.02 SHORTNESS OF BREATH: ICD-10-CM

## 2018-09-11 DIAGNOSIS — E87.70 HYPERVOLEMIA, UNSPECIFIED HYPERVOLEMIA TYPE: Primary | ICD-10-CM

## 2018-09-11 DIAGNOSIS — N18.6 ESRD ON HEMODIALYSIS (HCC): ICD-10-CM

## 2018-09-11 DIAGNOSIS — J96.11 CHRONIC RESPIRATORY FAILURE WITH HYPOXIA (HCC): ICD-10-CM

## 2018-09-11 LAB
A/G RATIO: 1.1 (ref 1.1–2.2)
ALBUMIN SERPL-MCNC: 3.9 G/DL (ref 3.4–5)
ALP BLD-CCNC: 70 U/L (ref 40–129)
ALT SERPL-CCNC: 12 U/L (ref 10–40)
ANION GAP SERPL CALCULATED.3IONS-SCNC: 18 MMOL/L (ref 3–16)
AST SERPL-CCNC: 17 U/L (ref 15–37)
BASOPHILS ABSOLUTE: 0.1 K/UL (ref 0–0.2)
BASOPHILS RELATIVE PERCENT: 0.7 %
BILIRUB SERPL-MCNC: 0.3 MG/DL (ref 0–1)
BUN BLDV-MCNC: 50 MG/DL (ref 7–20)
CALCIUM SERPL-MCNC: 9.7 MG/DL (ref 8.3–10.6)
CHLORIDE BLD-SCNC: 91 MMOL/L (ref 99–110)
CO2: 24 MMOL/L (ref 21–32)
CREAT SERPL-MCNC: 7.7 MG/DL (ref 0.8–1.3)
EOSINOPHILS ABSOLUTE: 0.7 K/UL (ref 0–0.6)
EOSINOPHILS RELATIVE PERCENT: 8.8 %
GFR AFRICAN AMERICAN: 9
GFR NON-AFRICAN AMERICAN: 7
GLOBULIN: 3.5 G/DL
GLUCOSE BLD-MCNC: 102 MG/DL (ref 70–99)
HCT VFR BLD CALC: 36.7 % (ref 40.5–52.5)
HEMOGLOBIN: 11.9 G/DL (ref 13.5–17.5)
LYMPHOCYTES ABSOLUTE: 0.6 K/UL (ref 1–5.1)
LYMPHOCYTES RELATIVE PERCENT: 7.7 %
MCH RBC QN AUTO: 29.5 PG (ref 26–34)
MCHC RBC AUTO-ENTMCNC: 32.4 G/DL (ref 31–36)
MCV RBC AUTO: 90.9 FL (ref 80–100)
MONOCYTES ABSOLUTE: 0.5 K/UL (ref 0–1.3)
MONOCYTES RELATIVE PERCENT: 5.7 %
NEUTROPHILS ABSOLUTE: 6.3 K/UL (ref 1.7–7.7)
NEUTROPHILS RELATIVE PERCENT: 77.1 %
PDW BLD-RTO: 19.4 % (ref 12.4–15.4)
PLATELET # BLD: 259 K/UL (ref 135–450)
PMV BLD AUTO: 6.7 FL (ref 5–10.5)
POTASSIUM REFLEX MAGNESIUM: 4.3 MMOL/L (ref 3.5–5.1)
PRO-BNP: ABNORMAL PG/ML (ref 0–124)
RBC # BLD: 4.03 M/UL (ref 4.2–5.9)
SODIUM BLD-SCNC: 133 MMOL/L (ref 136–145)
TOTAL PROTEIN: 7.4 G/DL (ref 6.4–8.2)
WBC # BLD: 8.2 K/UL (ref 4–11)

## 2018-09-11 PROCEDURE — 80053 COMPREHEN METABOLIC PANEL: CPT

## 2018-09-11 PROCEDURE — 83880 ASSAY OF NATRIURETIC PEPTIDE: CPT

## 2018-09-11 PROCEDURE — 99285 EMERGENCY DEPT VISIT HI MDM: CPT

## 2018-09-11 PROCEDURE — 94640 AIRWAY INHALATION TREATMENT: CPT

## 2018-09-11 PROCEDURE — 85025 COMPLETE CBC W/AUTO DIFF WBC: CPT

## 2018-09-11 PROCEDURE — 71045 X-RAY EXAM CHEST 1 VIEW: CPT

## 2018-09-11 PROCEDURE — 93010 ELECTROCARDIOGRAM REPORT: CPT | Performed by: INTERNAL MEDICINE

## 2018-09-11 PROCEDURE — 6370000000 HC RX 637 (ALT 250 FOR IP): Performed by: EMERGENCY MEDICINE

## 2018-09-11 PROCEDURE — 36415 COLL VENOUS BLD VENIPUNCTURE: CPT

## 2018-09-11 PROCEDURE — 93005 ELECTROCARDIOGRAM TRACING: CPT | Performed by: EMERGENCY MEDICINE

## 2018-09-11 PROCEDURE — 94664 DEMO&/EVAL PT USE INHALER: CPT

## 2018-09-11 RX ORDER — IPRATROPIUM BROMIDE AND ALBUTEROL SULFATE 2.5; .5 MG/3ML; MG/3ML
1 SOLUTION RESPIRATORY (INHALATION) ONCE
Status: COMPLETED | OUTPATIENT
Start: 2018-09-11 | End: 2018-09-11

## 2018-09-11 RX ADMIN — IPRATROPIUM BROMIDE AND ALBUTEROL SULFATE 1 AMPULE: .5; 3 SOLUTION RESPIRATORY (INHALATION) at 02:13
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
## 2018-09-11 NOTE — ED TRIAGE NOTES
Chief Complaint   Patient presents with    Shortness of Breath     pt arrives to room 10 via ems c/o sob, ems states pt wasin 80's on 3l at home which pt always wears, ems states pt bp 183/106 hr 102, fsbs 92, ems states pt was up to 99% on the btx enroute

## 2018-09-12 ENCOUNTER — APPOINTMENT (OUTPATIENT)
Dept: GENERAL RADIOLOGY | Age: 58
DRG: 291 | End: 2018-09-12
Payer: MEDICARE

## 2018-09-12 ENCOUNTER — HOSPITAL ENCOUNTER (INPATIENT)
Age: 58
LOS: 5 days | Discharge: HOME HEALTH | DRG: 291 | End: 2018-09-17
Attending: EMERGENCY MEDICINE | Admitting: HOSPITALIST
Payer: MEDICARE

## 2018-09-12 DIAGNOSIS — J44.1 COPD EXACERBATION (HCC): ICD-10-CM

## 2018-09-12 DIAGNOSIS — R06.03 ACUTE RESPIRATORY DISTRESS: Primary | ICD-10-CM

## 2018-09-12 DIAGNOSIS — I50.9 CONGESTIVE HEART FAILURE, UNSPECIFIED HF CHRONICITY, UNSPECIFIED HEART FAILURE TYPE (HCC): ICD-10-CM

## 2018-09-12 DIAGNOSIS — R09.02 HYPOXIA: ICD-10-CM

## 2018-09-12 PROBLEM — R79.89 ELEVATED BRAIN NATRIURETIC PEPTIDE (BNP) LEVEL: Status: ACTIVE | Noted: 2018-09-12

## 2018-09-12 PROBLEM — Z87.891 FORMER SMOKER: Chronic | Status: ACTIVE | Noted: 2018-09-12

## 2018-09-12 PROBLEM — J81.1 PULMONARY EDEMA: Status: ACTIVE | Noted: 2018-09-12

## 2018-09-12 PROBLEM — Z99.2 HEMODIALYSIS ACCESS SITE WITH ARTERIOVENOUS GRAFT (HCC): Status: RESOLVED | Noted: 2018-02-21 | Resolved: 2018-09-12

## 2018-09-12 PROBLEM — I42.9 CARDIOMYOPATHY (HCC): Status: RESOLVED | Noted: 2018-06-09 | Resolved: 2018-09-12

## 2018-09-12 PROBLEM — J96.20 ACUTE ON CHRONIC RESPIRATORY FAILURE (HCC): Status: ACTIVE | Noted: 2018-09-12

## 2018-09-12 PROBLEM — J96.21 ACUTE ON CHRONIC RESPIRATORY FAILURE WITH HYPOXIA AND HYPERCAPNIA (HCC): Status: ACTIVE | Noted: 2018-09-12

## 2018-09-12 PROBLEM — J96.22 ACUTE ON CHRONIC RESPIRATORY FAILURE WITH HYPOXIA AND HYPERCAPNIA (HCC): Status: ACTIVE | Noted: 2018-09-12

## 2018-09-12 PROBLEM — E11.9 DM2 (DIABETES MELLITUS, TYPE 2) (HCC): Chronic | Status: ACTIVE | Noted: 2017-12-30

## 2018-09-12 PROBLEM — J96.11 CHRONIC HYPOXEMIC RESPIRATORY FAILURE (HCC): Chronic | Status: ACTIVE | Noted: 2018-09-12

## 2018-09-12 PROBLEM — R65.10 SIRS (SYSTEMIC INFLAMMATORY RESPONSE SYNDROME) (HCC): Status: ACTIVE | Noted: 2018-09-12

## 2018-09-12 PROBLEM — R79.89 ELEVATED BRAIN NATRIURETIC PEPTIDE (BNP) LEVEL: Status: RESOLVED | Noted: 2018-09-12 | Resolved: 2018-09-12

## 2018-09-12 PROBLEM — I16.0 HYPERTENSIVE URGENCY: Status: ACTIVE | Noted: 2018-09-12

## 2018-09-12 PROBLEM — R29.818 SUSPECTED SLEEP APNEA: Chronic | Status: ACTIVE | Noted: 2018-06-09

## 2018-09-12 PROBLEM — I34.0 NON-RHEUMATIC MITRAL REGURGITATION: Status: RESOLVED | Noted: 2018-06-09 | Resolved: 2018-09-12

## 2018-09-12 PROBLEM — I15.0 RENOVASCULAR HYPERTENSION: Chronic | Status: ACTIVE | Noted: 2018-08-01

## 2018-09-12 PROBLEM — J90 PLEURAL EFFUSION, BILATERAL: Status: RESOLVED | Noted: 2018-06-09 | Resolved: 2018-09-12

## 2018-09-12 PROBLEM — I10 HTN (HYPERTENSION): Status: ACTIVE | Noted: 2018-09-12

## 2018-09-12 LAB
A/G RATIO: 1.1 (ref 1.1–2.2)
ALBUMIN SERPL-MCNC: 3.9 G/DL (ref 3.4–5)
ALP BLD-CCNC: 65 U/L (ref 40–129)
ALT SERPL-CCNC: 14 U/L (ref 10–40)
ANION GAP SERPL CALCULATED.3IONS-SCNC: 15 MMOL/L (ref 3–16)
APTT: 55.4 SEC (ref 26–36)
AST SERPL-CCNC: 24 U/L (ref 15–37)
BASE EXCESS ARTERIAL: 0.7 MMOL/L (ref -3–3)
BASOPHILS ABSOLUTE: 0.1 K/UL (ref 0–0.2)
BASOPHILS RELATIVE PERCENT: 1 %
BILIRUB SERPL-MCNC: 0.3 MG/DL (ref 0–1)
BUN BLDV-MCNC: 27 MG/DL (ref 7–20)
CALCIUM SERPL-MCNC: 9.6 MG/DL (ref 8.3–10.6)
CARBOXYHEMOGLOBIN ARTERIAL: 0.7 % (ref 0–1.5)
CHLORIDE BLD-SCNC: 95 MMOL/L (ref 99–110)
CO2: 26 MMOL/L (ref 21–32)
CREAT SERPL-MCNC: 4.5 MG/DL (ref 0.8–1.3)
EKG ATRIAL RATE: 102 BPM
EKG DIAGNOSIS: NORMAL
EKG P AXIS: 47 DEGREES
EKG P-R INTERVAL: 154 MS
EKG Q-T INTERVAL: 378 MS
EKG QRS DURATION: 86 MS
EKG QTC CALCULATION (BAZETT): 492 MS
EKG R AXIS: 14 DEGREES
EKG T AXIS: 45 DEGREES
EKG VENTRICULAR RATE: 102 BPM
EOSINOPHILS ABSOLUTE: 0.4 K/UL (ref 0–0.6)
EOSINOPHILS RELATIVE PERCENT: 3.1 %
ESTIMATED AVERAGE GLUCOSE: 91.1 MG/DL
GFR AFRICAN AMERICAN: 16
GFR NON-AFRICAN AMERICAN: 13
GLOBULIN: 3.6 G/DL
GLUCOSE BLD-MCNC: 110 MG/DL (ref 70–99)
GLUCOSE BLD-MCNC: 117 MG/DL (ref 70–99)
GLUCOSE BLD-MCNC: 137 MG/DL (ref 70–99)
GLUCOSE BLD-MCNC: 138 MG/DL (ref 70–99)
GLUCOSE BLD-MCNC: 202 MG/DL (ref 70–99)
HBA1C MFR BLD: 4.8 %
HCO3 ARTERIAL: 26.9 MMOL/L (ref 21–29)
HCT VFR BLD CALC: 37.8 % (ref 40.5–52.5)
HEMOGLOBIN, ART, EXTENDED: 12.3 G/DL (ref 13.5–17.5)
HEMOGLOBIN: 12.1 G/DL (ref 13.5–17.5)
INR BLD: 1.08 (ref 0.86–1.14)
LACTIC ACID: 0.7 MMOL/L (ref 0.4–2)
LYMPHOCYTES ABSOLUTE: 0.5 K/UL (ref 1–5.1)
LYMPHOCYTES RELATIVE PERCENT: 4.2 %
MAGNESIUM: 2.3 MG/DL (ref 1.8–2.4)
MCH RBC QN AUTO: 29.1 PG (ref 26–34)
MCHC RBC AUTO-ENTMCNC: 32.1 G/DL (ref 31–36)
MCV RBC AUTO: 90.7 FL (ref 80–100)
METHEMOGLOBIN ARTERIAL: 0.2 %
MONOCYTES ABSOLUTE: 0.7 K/UL (ref 0–1.3)
MONOCYTES RELATIVE PERCENT: 5.7 %
NEUTROPHILS ABSOLUTE: 10 K/UL (ref 1.7–7.7)
NEUTROPHILS RELATIVE PERCENT: 86 %
O2 CONTENT ARTERIAL: 16 ML/DL
O2 SAT, ARTERIAL: 95 %
O2 THERAPY: ABNORMAL
PCO2 ARTERIAL: 49.9 MMHG (ref 35–45)
PDW BLD-RTO: 19.1 % (ref 12.4–15.4)
PERFORMED ON: ABNORMAL
PH ARTERIAL: 7.35 (ref 7.35–7.45)
PLATELET # BLD: 273 K/UL (ref 135–450)
PMV BLD AUTO: 6.9 FL (ref 5–10.5)
PO2 ARTERIAL: 76.2 MMHG (ref 75–108)
POTASSIUM REFLEX MAGNESIUM: 4.1 MMOL/L (ref 3.5–5.1)
PRO-BNP: ABNORMAL PG/ML (ref 0–124)
PROTHROMBIN TIME: 12.3 SEC (ref 9.8–13)
RBC # BLD: 4.16 M/UL (ref 4.2–5.9)
SODIUM BLD-SCNC: 136 MMOL/L (ref 136–145)
TCO2 ARTERIAL: 28.5 MMOL/L
TOTAL PROTEIN: 7.5 G/DL (ref 6.4–8.2)
TROPONIN: 0.05 NG/ML
TROPONIN: 0.06 NG/ML
TROPONIN: 0.07 NG/ML
WBC # BLD: 11.6 K/UL (ref 4–11)

## 2018-09-12 PROCEDURE — 94640 AIRWAY INHALATION TREATMENT: CPT

## 2018-09-12 PROCEDURE — 6370000000 HC RX 637 (ALT 250 FOR IP): Performed by: NURSE PRACTITIONER

## 2018-09-12 PROCEDURE — 83605 ASSAY OF LACTIC ACID: CPT

## 2018-09-12 PROCEDURE — 83036 HEMOGLOBIN GLYCOSYLATED A1C: CPT

## 2018-09-12 PROCEDURE — 1200000000 HC SEMI PRIVATE

## 2018-09-12 PROCEDURE — 6360000002 HC RX W HCPCS: Performed by: HOSPITALIST

## 2018-09-12 PROCEDURE — 2700000000 HC OXYGEN THERAPY PER DAY

## 2018-09-12 PROCEDURE — 2580000003 HC RX 258: Performed by: HOSPITALIST

## 2018-09-12 PROCEDURE — 82803 BLOOD GASES ANY COMBINATION: CPT

## 2018-09-12 PROCEDURE — 36600 WITHDRAWAL OF ARTERIAL BLOOD: CPT

## 2018-09-12 PROCEDURE — 87040 BLOOD CULTURE FOR BACTERIA: CPT

## 2018-09-12 PROCEDURE — 71045 X-RAY EXAM CHEST 1 VIEW: CPT

## 2018-09-12 PROCEDURE — 85730 THROMBOPLASTIN TIME PARTIAL: CPT

## 2018-09-12 PROCEDURE — 83880 ASSAY OF NATRIURETIC PEPTIDE: CPT

## 2018-09-12 PROCEDURE — 6370000000 HC RX 637 (ALT 250 FOR IP): Performed by: HOSPITALIST

## 2018-09-12 PROCEDURE — 84484 ASSAY OF TROPONIN QUANT: CPT

## 2018-09-12 PROCEDURE — 94664 DEMO&/EVAL PT USE INHALER: CPT

## 2018-09-12 PROCEDURE — 93005 ELECTROCARDIOGRAM TRACING: CPT | Performed by: EMERGENCY MEDICINE

## 2018-09-12 PROCEDURE — 80053 COMPREHEN METABOLIC PANEL: CPT

## 2018-09-12 PROCEDURE — 6360000002 HC RX W HCPCS: Performed by: EMERGENCY MEDICINE

## 2018-09-12 PROCEDURE — 83735 ASSAY OF MAGNESIUM: CPT

## 2018-09-12 PROCEDURE — 6370000000 HC RX 637 (ALT 250 FOR IP): Performed by: EMERGENCY MEDICINE

## 2018-09-12 PROCEDURE — 36415 COLL VENOUS BLD VENIPUNCTURE: CPT

## 2018-09-12 PROCEDURE — 99285 EMERGENCY DEPT VISIT HI MDM: CPT

## 2018-09-12 PROCEDURE — 96374 THER/PROPH/DIAG INJ IV PUSH: CPT

## 2018-09-12 PROCEDURE — 85025 COMPLETE CBC W/AUTO DIFF WBC: CPT

## 2018-09-12 PROCEDURE — 85610 PROTHROMBIN TIME: CPT

## 2018-09-12 PROCEDURE — 2500000003 HC RX 250 WO HCPCS: Performed by: HOSPITALIST

## 2018-09-12 PROCEDURE — 2580000003 HC RX 258: Performed by: EMERGENCY MEDICINE

## 2018-09-12 PROCEDURE — 94761 N-INVAS EAR/PLS OXIMETRY MLT: CPT

## 2018-09-12 PROCEDURE — 90937 HEMODIALYSIS REPEATED EVAL: CPT

## 2018-09-12 RX ORDER — ACETAMINOPHEN 325 MG/1
650 TABLET ORAL EVERY 4 HOURS PRN
Status: DISCONTINUED | OUTPATIENT
Start: 2018-09-12 | End: 2018-09-17 | Stop reason: HOSPADM

## 2018-09-12 RX ORDER — TRAZODONE HYDROCHLORIDE 50 MG/1
150 TABLET ORAL NIGHTLY
Status: DISCONTINUED | OUTPATIENT
Start: 2018-09-12 | End: 2018-09-17 | Stop reason: HOSPADM

## 2018-09-12 RX ORDER — DEXTROSE MONOHYDRATE 25 G/50ML
12.5 INJECTION, SOLUTION INTRAVENOUS PRN
Status: DISCONTINUED | OUTPATIENT
Start: 2018-09-12 | End: 2018-09-14

## 2018-09-12 RX ORDER — ASPIRIN 81 MG/1
81 TABLET ORAL DAILY
Status: DISCONTINUED | OUTPATIENT
Start: 2018-09-12 | End: 2018-09-17 | Stop reason: HOSPADM

## 2018-09-12 RX ORDER — HEPARIN SODIUM 5000 [USP'U]/ML
5000 INJECTION, SOLUTION INTRAVENOUS; SUBCUTANEOUS EVERY 8 HOURS SCHEDULED
Status: DISCONTINUED | OUTPATIENT
Start: 2018-09-12 | End: 2018-09-17 | Stop reason: HOSPADM

## 2018-09-12 RX ORDER — LEVALBUTEROL 1.25 MG/.5ML
0.63 SOLUTION, CONCENTRATE RESPIRATORY (INHALATION) EVERY 4 HOURS
Status: DISCONTINUED | OUTPATIENT
Start: 2018-09-12 | End: 2018-09-17 | Stop reason: HOSPADM

## 2018-09-12 RX ORDER — GUAIFENESIN 600 MG/1
600 TABLET, EXTENDED RELEASE ORAL 2 TIMES DAILY
Status: DISCONTINUED | OUTPATIENT
Start: 2018-09-12 | End: 2018-09-17 | Stop reason: HOSPADM

## 2018-09-12 RX ORDER — CLOPIDOGREL BISULFATE 75 MG/1
75 TABLET ORAL DAILY
Status: DISCONTINUED | OUTPATIENT
Start: 2018-09-12 | End: 2018-09-17 | Stop reason: HOSPADM

## 2018-09-12 RX ORDER — DEXTROSE MONOHYDRATE 50 MG/ML
100 INJECTION, SOLUTION INTRAVENOUS PRN
Status: DISCONTINUED | OUTPATIENT
Start: 2018-09-12 | End: 2018-09-14

## 2018-09-12 RX ORDER — METHYLPREDNISOLONE SODIUM SUCCINATE 40 MG/ML
40 INJECTION, POWDER, LYOPHILIZED, FOR SOLUTION INTRAMUSCULAR; INTRAVENOUS EVERY 12 HOURS
Status: DISCONTINUED | OUTPATIENT
Start: 2018-09-12 | End: 2018-09-14

## 2018-09-12 RX ORDER — NICOTINE POLACRILEX 4 MG
15 LOZENGE BUCCAL PRN
Status: DISCONTINUED | OUTPATIENT
Start: 2018-09-12 | End: 2018-09-17 | Stop reason: HOSPADM

## 2018-09-12 RX ORDER — AMIODARONE HYDROCHLORIDE 200 MG/1
100 TABLET ORAL DAILY
Status: DISCONTINUED | OUTPATIENT
Start: 2018-09-12 | End: 2018-09-17 | Stop reason: HOSPADM

## 2018-09-12 RX ORDER — SODIUM CHLORIDE 9 MG/ML
INJECTION, SOLUTION INTRAVENOUS CONTINUOUS
Status: DISCONTINUED | OUTPATIENT
Start: 2018-09-12 | End: 2018-09-12

## 2018-09-12 RX ORDER — ONDANSETRON 2 MG/ML
4 INJECTION INTRAMUSCULAR; INTRAVENOUS EVERY 6 HOURS PRN
Status: DISCONTINUED | OUTPATIENT
Start: 2018-09-12 | End: 2018-09-12 | Stop reason: SINTOL

## 2018-09-12 RX ORDER — B COMPLEX, C NO.20/FOLIC ACID 1 MG
1 CAPSULE ORAL DAILY
Status: DISCONTINUED | OUTPATIENT
Start: 2018-09-12 | End: 2018-09-12 | Stop reason: CLARIF

## 2018-09-12 RX ORDER — DOCUSATE SODIUM 100 MG/1
100 CAPSULE, LIQUID FILLED ORAL 2 TIMES DAILY
Status: DISCONTINUED | OUTPATIENT
Start: 2018-09-12 | End: 2018-09-17 | Stop reason: HOSPADM

## 2018-09-12 RX ORDER — CETIRIZINE HYDROCHLORIDE 10 MG/1
5 TABLET ORAL DAILY
Status: DISCONTINUED | OUTPATIENT
Start: 2018-09-12 | End: 2018-09-15

## 2018-09-12 RX ORDER — CITALOPRAM 20 MG/1
40 TABLET ORAL DAILY
Status: DISCONTINUED | OUTPATIENT
Start: 2018-09-12 | End: 2018-09-17 | Stop reason: HOSPADM

## 2018-09-12 RX ORDER — IPRATROPIUM BROMIDE AND ALBUTEROL SULFATE 2.5; .5 MG/3ML; MG/3ML
1 SOLUTION RESPIRATORY (INHALATION) EVERY 5 MIN PRN
Status: DISCONTINUED | OUTPATIENT
Start: 2018-09-12 | End: 2018-09-12

## 2018-09-12 RX ORDER — SODIUM CHLORIDE 0.9 % (FLUSH) 0.9 %
10 SYRINGE (ML) INJECTION EVERY 12 HOURS SCHEDULED
Status: DISCONTINUED | OUTPATIENT
Start: 2018-09-12 | End: 2018-09-14

## 2018-09-12 RX ORDER — SEVELAMER CARBONATE 800 MG/1
3200 TABLET, FILM COATED ORAL 3 TIMES DAILY
Status: DISCONTINUED | OUTPATIENT
Start: 2018-09-12 | End: 2018-09-17 | Stop reason: HOSPADM

## 2018-09-12 RX ORDER — FAMOTIDINE 20 MG/1
20 TABLET, FILM COATED ORAL DAILY
Status: DISCONTINUED | OUTPATIENT
Start: 2018-09-12 | End: 2018-09-17 | Stop reason: HOSPADM

## 2018-09-12 RX ORDER — SODIUM CHLORIDE 0.9 % (FLUSH) 0.9 %
10 SYRINGE (ML) INJECTION PRN
Status: DISCONTINUED | OUTPATIENT
Start: 2018-09-12 | End: 2018-09-14

## 2018-09-12 RX ORDER — METHYLPREDNISOLONE SODIUM SUCCINATE 125 MG/2ML
125 INJECTION, POWDER, LYOPHILIZED, FOR SOLUTION INTRAMUSCULAR; INTRAVENOUS ONCE
Status: COMPLETED | OUTPATIENT
Start: 2018-09-12 | End: 2018-09-12

## 2018-09-12 RX ADMIN — IPRATROPIUM BROMIDE AND ALBUTEROL SULFATE 1 AMPULE: .5; 3 SOLUTION RESPIRATORY (INHALATION) at 01:55

## 2018-09-12 RX ADMIN — TRAZODONE HYDROCHLORIDE 150 MG: 50 TABLET ORAL at 21:17

## 2018-09-12 RX ADMIN — LEVALBUTEROL HYDROCHLORIDE 0.63 MG: 1.25 SOLUTION, CONCENTRATE RESPIRATORY (INHALATION) at 12:57

## 2018-09-12 RX ADMIN — DOCUSATE SODIUM 100 MG: 100 CAPSULE, LIQUID FILLED ORAL at 10:01

## 2018-09-12 RX ADMIN — SEVELAMER CARBONATE 3200 MG: 800 TABLET, FILM COATED ORAL at 10:00

## 2018-09-12 RX ADMIN — LEVALBUTEROL HYDROCHLORIDE 0.63 MG: 1.25 SOLUTION, CONCENTRATE RESPIRATORY (INHALATION) at 23:50

## 2018-09-12 RX ADMIN — DOXYCYCLINE 100 MG: 100 INJECTION, POWDER, LYOPHILIZED, FOR SOLUTION INTRAVENOUS at 12:51

## 2018-09-12 RX ADMIN — METHYLPREDNISOLONE SODIUM SUCCINATE 40 MG: 40 INJECTION, POWDER, FOR SOLUTION INTRAMUSCULAR; INTRAVENOUS at 09:59

## 2018-09-12 RX ADMIN — INSULIN LISPRO 2 UNITS: 100 INJECTION, SOLUTION INTRAVENOUS; SUBCUTANEOUS at 12:53

## 2018-09-12 RX ADMIN — SODIUM CHLORIDE, PRESERVATIVE FREE 10 ML: 5 INJECTION INTRAVENOUS at 09:58

## 2018-09-12 RX ADMIN — LEVALBUTEROL HYDROCHLORIDE 0.63 MG: 1.25 SOLUTION, CONCENTRATE RESPIRATORY (INHALATION) at 20:24

## 2018-09-12 RX ADMIN — HEPARIN SODIUM 5000 UNITS: 5000 INJECTION INTRAVENOUS; SUBCUTANEOUS at 12:52

## 2018-09-12 RX ADMIN — ASPIRIN 81 MG: 81 TABLET ORAL at 10:01

## 2018-09-12 RX ADMIN — CLOPIDOGREL BISULFATE 75 MG: 75 TABLET ORAL at 10:01

## 2018-09-12 RX ADMIN — METHYLPREDNISOLONE SODIUM SUCCINATE 125 MG: 125 INJECTION, POWDER, FOR SOLUTION INTRAMUSCULAR; INTRAVENOUS at 02:14

## 2018-09-12 RX ADMIN — CEFTRIAXONE 1 G: 1 INJECTION, POWDER, FOR SOLUTION INTRAMUSCULAR; INTRAVENOUS at 09:56

## 2018-09-12 RX ADMIN — SEVELAMER CARBONATE 3200 MG: 800 TABLET, FILM COATED ORAL at 21:17

## 2018-09-12 RX ADMIN — ACETAMINOPHEN 650 MG: 325 TABLET ORAL at 23:01

## 2018-09-12 RX ADMIN — FAMOTIDINE 20 MG: 20 TABLET ORAL at 10:01

## 2018-09-12 RX ADMIN — METHYLPREDNISOLONE SODIUM SUCCINATE 40 MG: 40 INJECTION, POWDER, FOR SOLUTION INTRAMUSCULAR; INTRAVENOUS at 21:17

## 2018-09-12 RX ADMIN — SODIUM CHLORIDE, PRESERVATIVE FREE 10 ML: 5 INJECTION INTRAVENOUS at 21:17

## 2018-09-12 RX ADMIN — DOXYCYCLINE 100 MG: 100 INJECTION, POWDER, LYOPHILIZED, FOR SOLUTION INTRAVENOUS at 21:27

## 2018-09-12 RX ADMIN — HEPARIN SODIUM 5000 UNITS: 5000 INJECTION INTRAVENOUS; SUBCUTANEOUS at 21:17

## 2018-09-12 RX ADMIN — SODIUM CHLORIDE: 9 INJECTION, SOLUTION INTRAVENOUS at 02:14

## 2018-09-12 RX ADMIN — LEVALBUTEROL HYDROCHLORIDE 0.63 MG: 1.25 SOLUTION, CONCENTRATE RESPIRATORY (INHALATION) at 09:01

## 2018-09-12 RX ADMIN — GUAIFENESIN 600 MG: 600 TABLET, EXTENDED RELEASE ORAL at 23:01

## 2018-09-12 RX ADMIN — DOCUSATE SODIUM 100 MG: 100 CAPSULE, LIQUID FILLED ORAL at 21:17

## 2018-09-12 RX ADMIN — CETIRIZINE HYDROCHLORIDE 5 MG: 10 TABLET, FILM COATED ORAL at 10:01

## 2018-09-12 ASSESSMENT — PAIN DESCRIPTION - LOCATION: LOCATION: CHEST

## 2018-09-12 ASSESSMENT — PAIN SCALES - GENERAL
PAINLEVEL_OUTOF10: 0
PAINLEVEL_OUTOF10: 2
PAINLEVEL_OUTOF10: 3
PAINLEVEL_OUTOF10: 0
PAINLEVEL_OUTOF10: 5

## 2018-09-12 ASSESSMENT — PAIN DESCRIPTION - PAIN TYPE
TYPE: ACUTE PAIN
TYPE: ACUTE PAIN
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
## 2018-09-13 LAB
ALBUMIN SERPL-MCNC: 3.5 G/DL (ref 3.4–5)
ANION GAP SERPL CALCULATED.3IONS-SCNC: 19 MMOL/L (ref 3–16)
BUN BLDV-MCNC: 50 MG/DL (ref 7–20)
CALCIUM SERPL-MCNC: 9.9 MG/DL (ref 8.3–10.6)
CHLORIDE BLD-SCNC: 98 MMOL/L (ref 99–110)
CO2: 16 MMOL/L (ref 21–32)
CREAT SERPL-MCNC: 6.5 MG/DL (ref 0.8–1.3)
EKG ATRIAL RATE: 115 BPM
EKG DIAGNOSIS: NORMAL
EKG P AXIS: 41 DEGREES
EKG P-R INTERVAL: 148 MS
EKG Q-T INTERVAL: 352 MS
EKG QRS DURATION: 82 MS
EKG QTC CALCULATION (BAZETT): 486 MS
EKG R AXIS: 43 DEGREES
EKG T AXIS: 52 DEGREES
EKG VENTRICULAR RATE: 115 BPM
GFR AFRICAN AMERICAN: 11
GFR NON-AFRICAN AMERICAN: 9
GLUCOSE BLD-MCNC: 105 MG/DL (ref 70–99)
GLUCOSE BLD-MCNC: 151 MG/DL (ref 70–99)
GLUCOSE BLD-MCNC: 153 MG/DL (ref 70–99)
GLUCOSE BLD-MCNC: 159 MG/DL (ref 70–99)
GLUCOSE BLD-MCNC: 83 MG/DL (ref 70–99)
HCT VFR BLD CALC: 36.9 % (ref 40.5–52.5)
HEMOGLOBIN: 11.4 G/DL (ref 13.5–17.5)
MCH RBC QN AUTO: 28.7 PG (ref 26–34)
MCHC RBC AUTO-ENTMCNC: 30.9 G/DL (ref 31–36)
MCV RBC AUTO: 93 FL (ref 80–100)
PDW BLD-RTO: 19.2 % (ref 12.4–15.4)
PERFORMED ON: ABNORMAL
PERFORMED ON: NORMAL
PHOSPHORUS: 4 MG/DL (ref 2.5–4.9)
PLATELET # BLD: 243 K/UL (ref 135–450)
PMV BLD AUTO: 6.9 FL (ref 5–10.5)
POTASSIUM SERPL-SCNC: 4.3 MMOL/L (ref 3.5–5.1)
RBC # BLD: 3.96 M/UL (ref 4.2–5.9)
SODIUM BLD-SCNC: 133 MMOL/L (ref 136–145)
WBC # BLD: 12.2 K/UL (ref 4–11)

## 2018-09-13 PROCEDURE — 2580000003 HC RX 258: Performed by: HOSPITALIST

## 2018-09-13 PROCEDURE — 85027 COMPLETE CBC AUTOMATED: CPT

## 2018-09-13 PROCEDURE — 2700000000 HC OXYGEN THERAPY PER DAY

## 2018-09-13 PROCEDURE — 90937 HEMODIALYSIS REPEATED EVAL: CPT

## 2018-09-13 PROCEDURE — 2500000003 HC RX 250 WO HCPCS: Performed by: HOSPITALIST

## 2018-09-13 PROCEDURE — 6370000000 HC RX 637 (ALT 250 FOR IP): Performed by: INTERNAL MEDICINE

## 2018-09-13 PROCEDURE — 1200000000 HC SEMI PRIVATE

## 2018-09-13 PROCEDURE — 80069 RENAL FUNCTION PANEL: CPT

## 2018-09-13 PROCEDURE — 5A1D70Z PERFORMANCE OF URINARY FILTRATION, INTERMITTENT, LESS THAN 6 HOURS PER DAY: ICD-10-PCS | Performed by: INTERNAL MEDICINE

## 2018-09-13 PROCEDURE — 6370000000 HC RX 637 (ALT 250 FOR IP): Performed by: NURSE PRACTITIONER

## 2018-09-13 PROCEDURE — 6370000000 HC RX 637 (ALT 250 FOR IP): Performed by: HOSPITALIST

## 2018-09-13 PROCEDURE — 6360000002 HC RX W HCPCS: Performed by: HOSPITALIST

## 2018-09-13 PROCEDURE — 36415 COLL VENOUS BLD VENIPUNCTURE: CPT

## 2018-09-13 PROCEDURE — 93010 ELECTROCARDIOGRAM REPORT: CPT | Performed by: INTERNAL MEDICINE

## 2018-09-13 PROCEDURE — 94640 AIRWAY INHALATION TREATMENT: CPT

## 2018-09-13 PROCEDURE — 94761 N-INVAS EAR/PLS OXIMETRY MLT: CPT

## 2018-09-13 RX ORDER — SODIUM CHLORIDE 9 MG/ML
INJECTION, SOLUTION INTRAVENOUS
Status: DISPENSED
Start: 2018-09-13 | End: 2018-09-14

## 2018-09-13 RX ORDER — METOPROLOL TARTRATE 50 MG/1
50 TABLET, FILM COATED ORAL 2 TIMES DAILY
Status: DISCONTINUED | OUTPATIENT
Start: 2018-09-13 | End: 2018-09-17 | Stop reason: HOSPADM

## 2018-09-13 RX ADMIN — SODIUM CHLORIDE, PRESERVATIVE FREE 10 ML: 5 INJECTION INTRAVENOUS at 20:34

## 2018-09-13 RX ADMIN — GUAIFENESIN 600 MG: 600 TABLET, EXTENDED RELEASE ORAL at 13:05

## 2018-09-13 RX ADMIN — DOXYCYCLINE 100 MG: 100 INJECTION, POWDER, LYOPHILIZED, FOR SOLUTION INTRAVENOUS at 20:33

## 2018-09-13 RX ADMIN — GUAIFENESIN 600 MG: 600 TABLET, EXTENDED RELEASE ORAL at 20:33

## 2018-09-13 RX ADMIN — METHYLPREDNISOLONE SODIUM SUCCINATE 40 MG: 40 INJECTION, POWDER, FOR SOLUTION INTRAMUSCULAR; INTRAVENOUS at 13:07

## 2018-09-13 RX ADMIN — SEVELAMER CARBONATE 3200 MG: 800 TABLET, FILM COATED ORAL at 13:05

## 2018-09-13 RX ADMIN — CLOPIDOGREL BISULFATE 75 MG: 75 TABLET ORAL at 13:05

## 2018-09-13 RX ADMIN — METOPROLOL TARTRATE 50 MG: 50 TABLET ORAL at 20:33

## 2018-09-13 RX ADMIN — LEVALBUTEROL HYDROCHLORIDE 0.63 MG: 1.25 SOLUTION, CONCENTRATE RESPIRATORY (INHALATION) at 15:43

## 2018-09-13 RX ADMIN — DOCUSATE SODIUM 100 MG: 100 CAPSULE, LIQUID FILLED ORAL at 20:33

## 2018-09-13 RX ADMIN — ASPIRIN 81 MG: 81 TABLET ORAL at 13:05

## 2018-09-13 RX ADMIN — METHYLPREDNISOLONE SODIUM SUCCINATE 40 MG: 40 INJECTION, POWDER, FOR SOLUTION INTRAMUSCULAR; INTRAVENOUS at 20:33

## 2018-09-13 RX ADMIN — CITALOPRAM HYDROBROMIDE 40 MG: 20 TABLET ORAL at 13:06

## 2018-09-13 RX ADMIN — CEFTRIAXONE 1 G: 1 INJECTION, POWDER, FOR SOLUTION INTRAMUSCULAR; INTRAVENOUS at 06:22

## 2018-09-13 RX ADMIN — DOXYCYCLINE 100 MG: 100 INJECTION, POWDER, LYOPHILIZED, FOR SOLUTION INTRAVENOUS at 13:07

## 2018-09-13 RX ADMIN — SODIUM CHLORIDE, PRESERVATIVE FREE 10 ML: 5 INJECTION INTRAVENOUS at 13:07

## 2018-09-13 RX ADMIN — HEPARIN SODIUM 5000 UNITS: 5000 INJECTION INTRAVENOUS; SUBCUTANEOUS at 15:46

## 2018-09-13 RX ADMIN — CETIRIZINE HYDROCHLORIDE 5 MG: 10 TABLET, FILM COATED ORAL at 13:06

## 2018-09-13 RX ADMIN — HEPARIN SODIUM 5000 UNITS: 5000 INJECTION INTRAVENOUS; SUBCUTANEOUS at 20:33

## 2018-09-13 RX ADMIN — SEVELAMER CARBONATE 3200 MG: 800 TABLET, FILM COATED ORAL at 20:33

## 2018-09-13 RX ADMIN — INSULIN LISPRO 1 UNITS: 100 INJECTION, SOLUTION INTRAVENOUS; SUBCUTANEOUS at 17:43

## 2018-09-13 RX ADMIN — HEPARIN SODIUM 5000 UNITS: 5000 INJECTION INTRAVENOUS; SUBCUTANEOUS at 06:21

## 2018-09-13 RX ADMIN — LEVALBUTEROL HYDROCHLORIDE 0.63 MG: 1.25 SOLUTION, CONCENTRATE RESPIRATORY (INHALATION) at 20:10

## 2018-09-13 RX ADMIN — DOCUSATE SODIUM 100 MG: 100 CAPSULE, LIQUID FILLED ORAL at 13:06

## 2018-09-13 RX ADMIN — AMIODARONE HYDROCHLORIDE 100 MG: 200 TABLET ORAL at 13:05

## 2018-09-13 RX ADMIN — TRAZODONE HYDROCHLORIDE 150 MG: 50 TABLET ORAL at 20:33

## 2018-09-13 RX ADMIN — LEVALBUTEROL HYDROCHLORIDE 0.63 MG: 1.25 SOLUTION, CONCENTRATE RESPIRATORY (INHALATION) at 03:45

## 2018-09-13 RX ADMIN — METOPROLOL TARTRATE 50 MG: 50 TABLET ORAL at 15:46

## 2018-09-13 RX ADMIN — FAMOTIDINE 20 MG: 20 TABLET ORAL at 13:05

## 2018-09-13 ASSESSMENT — PAIN SCALES - GENERAL: PAINLEVEL_OUTOF10: 0

## 2018-09-13 NOTE — PROGRESS NOTES
Pt. Back from dialysis. A&Ox4. Denies pain. Legally blind. Lungs wheezing throughout. Pt. On 3L nc. VSS. No distress noted.

## 2018-09-13 NOTE — PROGRESS NOTES
Lia Greer NP notified that Pt is having a persistent coughing attack and and his O2 sat is decreasing to 86 on 3L NC, O2 increased to 4L NC and O2 increased to 90-91. Pt SOB and diaphoretic from coughing. . can we get some cough medication with out Codeine. Awaiting response.

## 2018-09-13 NOTE — PROGRESS NOTES
Department of Internal Medicine  Nephrology Progress Note        SUBJECTIVE:    We are following this patient for ESRD management. The patient was seen and examined; he feels well today with no CP, SOB, nausea or vomiting. ROS: No fever or chills. Social: No family at bedside. Physical Exam:    VITALS:  BP (!) 165/88   Pulse 98   Temp 97.9 °F (36.6 °C)   Resp 18   Ht 5' 10\" (1.778 m)   Wt 205 lb 4 oz (93.1 kg)   SpO2 99%   BMI 29.45 kg/m²     General appearance: Seems comfortable, no acute distress. Neck: Trachea midline, thyroid normal.   Lungs:  Non labored breathing, CTA to anterior auscultation. Heart:  S1S2 normal, rub or gallop. No peripheral edema. Abdomen: Soft, non-tender, no organomegaly. Skin: No lesions or rashes, warm to touch. DATA:    CBC:   Lab Results   Component Value Date    WBC 12.2 09/13/2018    RBC 3.96 09/13/2018    HGB 11.4 09/13/2018    HCT 36.9 09/13/2018    MCV 93.0 09/13/2018    MCH 28.7 09/13/2018    MCHC 30.9 09/13/2018    RDW 19.2 09/13/2018     09/13/2018    MPV 6.9 09/13/2018     BMP:    Lab Results   Component Value Date     09/13/2018    K 4.3 09/13/2018    K 4.1 09/12/2018    CL 98 09/13/2018    CO2 16 09/13/2018    BUN 50 09/13/2018    LABALBU 3.5 09/13/2018    CREATININE 6.5 09/13/2018    CALCIUM 9.9 09/13/2018    GFRAA 11 09/13/2018    GFRAA 22 02/22/2013    LABGLOM 9 09/13/2018    GLUCOSE 159 09/13/2018       IMPRESSION/RECOMMENDATIONS:      1- ESRD: We will arrange for hemodialysis today per TT schedule. 2- No significant electrolytes disorders noted. 3- HTN: Blood pressure remains elevated, we will start Metoprolol for better control. 4- Anemia: Hemoglobin above target for ESRD, hold TOBY and monitor.

## 2018-09-14 ENCOUNTER — HOSPITAL ENCOUNTER (OUTPATIENT)
Dept: CARDIAC CATH/INVASIVE PROCEDURES | Age: 58
DRG: 291 | End: 2018-09-14
Attending: INTERNAL MEDICINE
Payer: MEDICARE

## 2018-09-14 LAB
ALBUMIN SERPL-MCNC: 3.6 G/DL (ref 3.4–5)
ANION GAP SERPL CALCULATED.3IONS-SCNC: 13 MMOL/L (ref 3–16)
BUN BLDV-MCNC: 38 MG/DL (ref 7–20)
CALCIUM SERPL-MCNC: 10 MG/DL (ref 8.3–10.6)
CHLORIDE BLD-SCNC: 95 MMOL/L (ref 99–110)
CO2: 23 MMOL/L (ref 21–32)
CREAT SERPL-MCNC: 4.5 MG/DL (ref 0.8–1.3)
EKG ATRIAL RATE: 337 BPM
EKG DIAGNOSIS: NORMAL
EKG Q-T INTERVAL: 322 MS
EKG QRS DURATION: 96 MS
EKG QTC CALCULATION (BAZETT): 415 MS
EKG R AXIS: 34 DEGREES
EKG T AXIS: 31 DEGREES
EKG VENTRICULAR RATE: 100 BPM
GFR AFRICAN AMERICAN: 16
GFR NON-AFRICAN AMERICAN: 13
GLUCOSE BLD-MCNC: 115 MG/DL (ref 70–99)
GLUCOSE BLD-MCNC: 121 MG/DL (ref 70–99)
GLUCOSE BLD-MCNC: 129 MG/DL (ref 70–99)
GLUCOSE BLD-MCNC: 158 MG/DL (ref 70–99)
GLUCOSE BLD-MCNC: 167 MG/DL (ref 70–99)
HCT VFR BLD CALC: 39.8 % (ref 40.5–52.5)
HEMOGLOBIN: 12.8 G/DL (ref 13.5–17.5)
MCH RBC QN AUTO: 29.3 PG (ref 26–34)
MCHC RBC AUTO-ENTMCNC: 32.1 G/DL (ref 31–36)
MCV RBC AUTO: 91.3 FL (ref 80–100)
PDW BLD-RTO: 19.1 % (ref 12.4–15.4)
PERFORMED ON: ABNORMAL
PHOSPHORUS: 3.6 MG/DL (ref 2.5–4.9)
PLATELET # BLD: 316 K/UL (ref 135–450)
PMV BLD AUTO: 7.5 FL (ref 5–10.5)
POTASSIUM SERPL-SCNC: 5.1 MMOL/L (ref 3.5–5.1)
RBC # BLD: 4.36 M/UL (ref 4.2–5.9)
SODIUM BLD-SCNC: 131 MMOL/L (ref 136–145)
WBC # BLD: 15 K/UL (ref 4–11)

## 2018-09-14 PROCEDURE — 93010 ELECTROCARDIOGRAM REPORT: CPT | Performed by: INTERNAL MEDICINE

## 2018-09-14 PROCEDURE — 6360000002 HC RX W HCPCS: Performed by: HOSPITALIST

## 2018-09-14 PROCEDURE — 1200000000 HC SEMI PRIVATE

## 2018-09-14 PROCEDURE — 2580000003 HC RX 258: Performed by: HOSPITALIST

## 2018-09-14 PROCEDURE — 2700000000 HC OXYGEN THERAPY PER DAY

## 2018-09-14 PROCEDURE — 36415 COLL VENOUS BLD VENIPUNCTURE: CPT

## 2018-09-14 PROCEDURE — 80069 RENAL FUNCTION PANEL: CPT

## 2018-09-14 PROCEDURE — 94640 AIRWAY INHALATION TREATMENT: CPT

## 2018-09-14 PROCEDURE — 6370000000 HC RX 637 (ALT 250 FOR IP): Performed by: INTERNAL MEDICINE

## 2018-09-14 PROCEDURE — 94761 N-INVAS EAR/PLS OXIMETRY MLT: CPT

## 2018-09-14 PROCEDURE — 93005 ELECTROCARDIOGRAM TRACING: CPT | Performed by: NURSE PRACTITIONER

## 2018-09-14 PROCEDURE — 85027 COMPLETE CBC AUTOMATED: CPT

## 2018-09-14 PROCEDURE — 6370000000 HC RX 637 (ALT 250 FOR IP): Performed by: HOSPITALIST

## 2018-09-14 PROCEDURE — 6370000000 HC RX 637 (ALT 250 FOR IP): Performed by: NURSE PRACTITIONER

## 2018-09-14 RX ORDER — AMOXICILLIN AND CLAVULANATE POTASSIUM 500; 125 MG/1; MG/1
1 TABLET, FILM COATED ORAL 2 TIMES DAILY WITH MEALS
Status: DISCONTINUED | OUTPATIENT
Start: 2018-09-14 | End: 2018-09-15

## 2018-09-14 RX ORDER — PREDNISONE 20 MG/1
40 TABLET ORAL DAILY
Status: DISCONTINUED | OUTPATIENT
Start: 2018-09-14 | End: 2018-09-16

## 2018-09-14 RX ORDER — DOXYCYCLINE HYCLATE 100 MG
100 TABLET ORAL EVERY 12 HOURS SCHEDULED
Status: DISCONTINUED | OUTPATIENT
Start: 2018-09-14 | End: 2018-09-17 | Stop reason: HOSPADM

## 2018-09-14 RX ADMIN — PREDNISONE 40 MG: 20 TABLET ORAL at 15:06

## 2018-09-14 RX ADMIN — LEVALBUTEROL HYDROCHLORIDE 0.63 MG: 1.25 SOLUTION, CONCENTRATE RESPIRATORY (INHALATION) at 16:29

## 2018-09-14 RX ADMIN — HEPARIN SODIUM 5000 UNITS: 5000 INJECTION INTRAVENOUS; SUBCUTANEOUS at 15:06

## 2018-09-14 RX ADMIN — INSULIN LISPRO 1 UNITS: 100 INJECTION, SOLUTION INTRAVENOUS; SUBCUTANEOUS at 21:50

## 2018-09-14 RX ADMIN — ASPIRIN 81 MG: 81 TABLET ORAL at 08:45

## 2018-09-14 RX ADMIN — SODIUM CHLORIDE, PRESERVATIVE FREE 10 ML: 5 INJECTION INTRAVENOUS at 08:46

## 2018-09-14 RX ADMIN — LEVALBUTEROL HYDROCHLORIDE 0.63 MG: 1.25 SOLUTION, CONCENTRATE RESPIRATORY (INHALATION) at 01:32

## 2018-09-14 RX ADMIN — CETIRIZINE HYDROCHLORIDE 5 MG: 10 TABLET, FILM COATED ORAL at 08:45

## 2018-09-14 RX ADMIN — FAMOTIDINE 20 MG: 20 TABLET ORAL at 08:45

## 2018-09-14 RX ADMIN — LEVALBUTEROL HYDROCHLORIDE 0.63 MG: 1.25 SOLUTION, CONCENTRATE RESPIRATORY (INHALATION) at 11:39

## 2018-09-14 RX ADMIN — LEVALBUTEROL HYDROCHLORIDE: 1.25 SOLUTION, CONCENTRATE RESPIRATORY (INHALATION) at 08:24

## 2018-09-14 RX ADMIN — AMOXICILLIN AND CLAVULANATE POTASSIUM 1 TABLET: 500; 125 TABLET, FILM COATED ORAL at 17:58

## 2018-09-14 RX ADMIN — TRAZODONE HYDROCHLORIDE 150 MG: 50 TABLET ORAL at 21:49

## 2018-09-14 RX ADMIN — HEPARIN SODIUM 5000 UNITS: 5000 INJECTION INTRAVENOUS; SUBCUTANEOUS at 06:03

## 2018-09-14 RX ADMIN — LEVALBUTEROL HYDROCHLORIDE 0.63 MG: 1.25 SOLUTION, CONCENTRATE RESPIRATORY (INHALATION) at 23:19

## 2018-09-14 RX ADMIN — CLOPIDOGREL BISULFATE 75 MG: 75 TABLET ORAL at 08:44

## 2018-09-14 RX ADMIN — METOPROLOL TARTRATE 50 MG: 50 TABLET ORAL at 08:44

## 2018-09-14 RX ADMIN — METOPROLOL TARTRATE 50 MG: 50 TABLET ORAL at 21:50

## 2018-09-14 RX ADMIN — LEVALBUTEROL HYDROCHLORIDE 0.63 MG: 1.25 SOLUTION, CONCENTRATE RESPIRATORY (INHALATION) at 04:22

## 2018-09-14 RX ADMIN — CITALOPRAM HYDROBROMIDE 40 MG: 20 TABLET ORAL at 08:45

## 2018-09-14 RX ADMIN — SEVELAMER CARBONATE 3200 MG: 800 TABLET, FILM COATED ORAL at 15:06

## 2018-09-14 RX ADMIN — SEVELAMER CARBONATE 3200 MG: 800 TABLET, FILM COATED ORAL at 21:49

## 2018-09-14 RX ADMIN — LEVALBUTEROL HYDROCHLORIDE 0.63 MG: 1.25 SOLUTION, CONCENTRATE RESPIRATORY (INHALATION) at 19:41

## 2018-09-14 RX ADMIN — DOCUSATE SODIUM 100 MG: 100 CAPSULE, LIQUID FILLED ORAL at 08:45

## 2018-09-14 RX ADMIN — HEPARIN SODIUM 5000 UNITS: 5000 INJECTION INTRAVENOUS; SUBCUTANEOUS at 21:50

## 2018-09-14 RX ADMIN — GUAIFENESIN 600 MG: 600 TABLET, EXTENDED RELEASE ORAL at 08:45

## 2018-09-14 RX ADMIN — DOXYCYCLINE HYCLATE 100 MG: 100 TABLET, COATED ORAL at 21:49

## 2018-09-14 RX ADMIN — AMIODARONE HYDROCHLORIDE 100 MG: 200 TABLET ORAL at 08:45

## 2018-09-14 RX ADMIN — DOCUSATE SODIUM 100 MG: 100 CAPSULE, LIQUID FILLED ORAL at 21:50

## 2018-09-14 RX ADMIN — SEVELAMER CARBONATE 3200 MG: 800 TABLET, FILM COATED ORAL at 08:44

## 2018-09-14 RX ADMIN — GUAIFENESIN 600 MG: 600 TABLET, EXTENDED RELEASE ORAL at 21:50

## 2018-09-14 ASSESSMENT — PAIN SCALES - GENERAL
PAINLEVEL_OUTOF10: 0

## 2018-09-14 NOTE — PROGRESS NOTES
Department of Internal Medicine  Nephrology Progress Note        SUBJECTIVE:    We are following this patient for ESRD management. The patient was seen and examined; he feels well today with no CP, SOB, nausea or vomiting. ROS: No fever or chills. Social: No family at bedside. Physical Exam:    VITALS:  BP (!) 141/95   Pulse 106   Temp 97.9 °F (36.6 °C) (Oral)   Resp 16   Ht 5' 10\" (1.778 m)   Wt 208 lb (94.3 kg)   SpO2 100%   BMI 29.84 kg/m²     General appearance: Seems comfortable, no acute distress. Neck: Trachea midline, thyroid normal.   Lungs:  Non labored breathing, CTA to anterior auscultation. Heart:  S1S2 normal, rub or gallop. No peripheral edema. Abdomen: Soft, non-tender, no organomegaly. Skin: No lesions or rashes, warm to touch.      DATA:    CBC:   Lab Results   Component Value Date    WBC 15.0 09/14/2018    RBC 4.36 09/14/2018    HGB 12.8 09/14/2018    HCT 39.8 09/14/2018    MCV 91.3 09/14/2018    MCH 29.3 09/14/2018    MCHC 32.1 09/14/2018    RDW 19.1 09/14/2018     09/14/2018    MPV 7.5 09/14/2018     BMP:    Lab Results   Component Value Date     09/14/2018    K 5.1 09/14/2018    K 4.1 09/12/2018    CL 95 09/14/2018    CO2 23 09/14/2018    BUN 38 09/14/2018    LABALBU 3.6 09/14/2018    CREATININE 4.5 09/14/2018    CALCIUM 10.0 09/14/2018    GFRAA 16 09/14/2018    GFRAA 22 02/22/2013    LABGLOM 13 09/14/2018    GLUCOSE 158 09/14/2018       IMPRESSION/RECOMMENDATIONS:      1- ESRD:  hemodialysis  per TThS schedule.  -next HD tomorrow  - NO PICC line please; central line vs transition to oral medication if possible    2- HTN:  Started Metoprolol on 9/13,  monitor  3- Anemia: hold TOBY for Hgb >11

## 2018-09-14 NOTE — CONSULTS
is no bruit. There is no  jugular venous distention. There is no lymphadenopathy or thyromegaly. LUNGS:  Clear to auscultation. Percussion is resonant. HEART:  S1 and S2.  Regular rate and rhythm. There is no murmur. There is  no gallop. PMI is nondisplaced. ABDOMEN:  Positive bowel sounds. Soft and nontender. There is no  organomegaly. SKIN:  No skin rash, no pigmentation. Normal skin texture and turgor. EXTREMITIES:  There is no edema. There is no cyanosis. There is no  deformity in the hand or feet. PSYCHIATRIC:  Normal mood and affect. Alert and oriented x3. NEUROLOGIC:  Normal gross motor and sensory exam.      DIAGNOSTIC WORKUP:  Potassium was 5.1, creatinine is 4.5. ProBNP [id number]. First troponin is 0.06, second is 0.05, and third is 0.06. Hemoglobin is  12, hematocrit 39, platelets are 141 and WBCs 15. ASSESSMENT:  1. Recurrent diastolic congestive heart failure due to volume overload. 2.  Paroxysmal atrial fibrillation and flutter. 3.  s/p left atrial appendage occlusion with Watchman device due to   recurrent bleeding from his AV fistula. 3.  Chronic respiratory failure, on home oxygen. 4.  History of pulmonary hypertension. RECOMMENDATIONS:  The patient has recurrent admission for congestive heart  failure due to volume overload. He needs lower dry bodyweight than what  has been tolerated so far to avoid the decompensation. As he is on  dialysis, there is nothing can be done from diuresis standpoint. I will  suggest ischemic work up prior to discharge. As far his atrial fibrillation is concerned, it is stable. He is on  low-dose Amiodarone for atrial fibrillation. He is also on aspirin as well  as Plavix. There is no need for Coumadin as he had recent obliteration of  his left atrial appendage. He is awaiting SWETHA and Plavix will be  discontinued based on the findings of the SWETHA. Thank you for the consultation.   We will continue to follow the patient  with

## 2018-09-14 NOTE — PROGRESS NOTES
Dr. Tatiana Joyner with nephrology returned my call. Pt is not to have a PICC. Will come up w a plan for IV medication.
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
## 2018-09-15 PROBLEM — R73.9 HYPERGLYCEMIA: Status: ACTIVE | Noted: 2018-09-15

## 2018-09-15 PROBLEM — I07.1 TRICUSPID REGURGITATION: Status: ACTIVE | Noted: 2018-09-15

## 2018-09-15 PROBLEM — D73.89 SPLENIC CALCIFICATION: Status: ACTIVE | Noted: 2018-09-15

## 2018-09-15 PROBLEM — D72.829 LEUKOCYTOSIS: Status: ACTIVE | Noted: 2018-09-15

## 2018-09-15 LAB
ALBUMIN SERPL-MCNC: 3.4 G/DL (ref 3.4–5)
ANION GAP SERPL CALCULATED.3IONS-SCNC: 16 MMOL/L (ref 3–16)
BUN BLDV-MCNC: 70 MG/DL (ref 7–20)
CALCIUM SERPL-MCNC: 9.8 MG/DL (ref 8.3–10.6)
CHLORIDE BLD-SCNC: 95 MMOL/L (ref 99–110)
CO2: 21 MMOL/L (ref 21–32)
CREAT SERPL-MCNC: 6.4 MG/DL (ref 0.8–1.3)
GFR AFRICAN AMERICAN: 11
GFR NON-AFRICAN AMERICAN: 9
GLUCOSE BLD-MCNC: 109 MG/DL (ref 70–99)
GLUCOSE BLD-MCNC: 139 MG/DL (ref 70–99)
GLUCOSE BLD-MCNC: 150 MG/DL (ref 70–99)
GLUCOSE BLD-MCNC: 163 MG/DL (ref 70–99)
GLUCOSE BLD-MCNC: 185 MG/DL (ref 70–99)
HCT VFR BLD CALC: 40.7 % (ref 40.5–52.5)
HEMOGLOBIN: 13 G/DL (ref 13.5–17.5)
MCH RBC QN AUTO: 29.1 PG (ref 26–34)
MCHC RBC AUTO-ENTMCNC: 32.1 G/DL (ref 31–36)
MCV RBC AUTO: 90.5 FL (ref 80–100)
PDW BLD-RTO: 19 % (ref 12.4–15.4)
PERFORMED ON: ABNORMAL
PHOSPHORUS: 4.6 MG/DL (ref 2.5–4.9)
PLATELET # BLD: 311 K/UL (ref 135–450)
PMV BLD AUTO: 7.9 FL (ref 5–10.5)
POTASSIUM SERPL-SCNC: 4.9 MMOL/L (ref 3.5–5.1)
RBC # BLD: 4.49 M/UL (ref 4.2–5.9)
SODIUM BLD-SCNC: 132 MMOL/L (ref 136–145)
TSH REFLEX FT4: 1.01 UIU/ML (ref 0.27–4.2)
WBC # BLD: 13.5 K/UL (ref 4–11)

## 2018-09-15 PROCEDURE — 90937 HEMODIALYSIS REPEATED EVAL: CPT

## 2018-09-15 PROCEDURE — 6360000002 HC RX W HCPCS: Performed by: HOSPITALIST

## 2018-09-15 PROCEDURE — 36415 COLL VENOUS BLD VENIPUNCTURE: CPT

## 2018-09-15 PROCEDURE — 85027 COMPLETE CBC AUTOMATED: CPT

## 2018-09-15 PROCEDURE — 94640 AIRWAY INHALATION TREATMENT: CPT

## 2018-09-15 PROCEDURE — 6370000000 HC RX 637 (ALT 250 FOR IP): Performed by: INTERNAL MEDICINE

## 2018-09-15 PROCEDURE — 94761 N-INVAS EAR/PLS OXIMETRY MLT: CPT

## 2018-09-15 PROCEDURE — 80069 RENAL FUNCTION PANEL: CPT

## 2018-09-15 PROCEDURE — 1200000000 HC SEMI PRIVATE

## 2018-09-15 PROCEDURE — 99233 SBSQ HOSP IP/OBS HIGH 50: CPT | Performed by: NURSE PRACTITIONER

## 2018-09-15 PROCEDURE — 6370000000 HC RX 637 (ALT 250 FOR IP): Performed by: HOSPITALIST

## 2018-09-15 PROCEDURE — 2700000000 HC OXYGEN THERAPY PER DAY

## 2018-09-15 PROCEDURE — 99223 1ST HOSP IP/OBS HIGH 75: CPT | Performed by: INTERNAL MEDICINE

## 2018-09-15 PROCEDURE — 84443 ASSAY THYROID STIM HORMONE: CPT

## 2018-09-15 PROCEDURE — 6370000000 HC RX 637 (ALT 250 FOR IP): Performed by: NURSE PRACTITIONER

## 2018-09-15 RX ORDER — CALCIUM CARBONATE 200(500)MG
500 TABLET,CHEWABLE ORAL ONCE
Status: COMPLETED | OUTPATIENT
Start: 2018-09-15 | End: 2018-09-15

## 2018-09-15 RX ADMIN — DOXYCYCLINE HYCLATE 100 MG: 100 TABLET, COATED ORAL at 21:37

## 2018-09-15 RX ADMIN — DOCUSATE SODIUM 100 MG: 100 CAPSULE, LIQUID FILLED ORAL at 21:38

## 2018-09-15 RX ADMIN — ASPIRIN 81 MG: 81 TABLET ORAL at 14:38

## 2018-09-15 RX ADMIN — METOPROLOL TARTRATE 50 MG: 50 TABLET ORAL at 21:38

## 2018-09-15 RX ADMIN — CLOPIDOGREL BISULFATE 75 MG: 75 TABLET ORAL at 14:38

## 2018-09-15 RX ADMIN — TRAZODONE HYDROCHLORIDE 150 MG: 50 TABLET ORAL at 21:37

## 2018-09-15 RX ADMIN — GUAIFENESIN 600 MG: 600 TABLET, EXTENDED RELEASE ORAL at 21:37

## 2018-09-15 RX ADMIN — HEPARIN SODIUM 5000 UNITS: 5000 INJECTION INTRAVENOUS; SUBCUTANEOUS at 06:47

## 2018-09-15 RX ADMIN — FAMOTIDINE 20 MG: 20 TABLET ORAL at 14:38

## 2018-09-15 RX ADMIN — GUAIFENESIN 600 MG: 600 TABLET, EXTENDED RELEASE ORAL at 14:38

## 2018-09-15 RX ADMIN — SEVELAMER CARBONATE 3200 MG: 800 TABLET, FILM COATED ORAL at 21:38

## 2018-09-15 RX ADMIN — LEVALBUTEROL HYDROCHLORIDE 0.63 MG: 1.25 SOLUTION, CONCENTRATE RESPIRATORY (INHALATION) at 19:40

## 2018-09-15 RX ADMIN — LEVALBUTEROL HYDROCHLORIDE 0.63 MG: 1.25 SOLUTION, CONCENTRATE RESPIRATORY (INHALATION) at 15:53

## 2018-09-15 RX ADMIN — METOPROLOL TARTRATE 50 MG: 50 TABLET ORAL at 14:37

## 2018-09-15 RX ADMIN — SEVELAMER CARBONATE 3200 MG: 800 TABLET, FILM COATED ORAL at 14:37

## 2018-09-15 RX ADMIN — HEPARIN SODIUM 5000 UNITS: 5000 INJECTION INTRAVENOUS; SUBCUTANEOUS at 14:38

## 2018-09-15 RX ADMIN — DOXYCYCLINE HYCLATE 100 MG: 100 TABLET, COATED ORAL at 14:37

## 2018-09-15 RX ADMIN — LEVALBUTEROL HYDROCHLORIDE 0.63 MG: 1.25 SOLUTION, CONCENTRATE RESPIRATORY (INHALATION) at 04:40

## 2018-09-15 RX ADMIN — AMOXICILLIN AND CLAVULANATE POTASSIUM 1 TABLET: 500; 125 TABLET, FILM COATED ORAL at 18:46

## 2018-09-15 RX ADMIN — PREDNISONE 40 MG: 20 TABLET ORAL at 14:38

## 2018-09-15 RX ADMIN — ANTACID TABLETS 500 MG: 500 TABLET, CHEWABLE ORAL at 02:11

## 2018-09-15 RX ADMIN — HEPARIN SODIUM 5000 UNITS: 5000 INJECTION INTRAVENOUS; SUBCUTANEOUS at 21:38

## 2018-09-15 RX ADMIN — CITALOPRAM HYDROBROMIDE 40 MG: 20 TABLET ORAL at 14:38

## 2018-09-15 RX ADMIN — CETIRIZINE HYDROCHLORIDE 5 MG: 10 TABLET, FILM COATED ORAL at 14:37

## 2018-09-15 RX ADMIN — AMIODARONE HYDROCHLORIDE 100 MG: 200 TABLET ORAL at 14:38

## 2018-09-15 ASSESSMENT — PAIN SCALES - GENERAL
PAINLEVEL_OUTOF10: 0
PAINLEVEL_OUTOF10: 0

## 2018-09-15 NOTE — PROGRESS NOTES
Hospitalist Progress Note  9/15/2018 4:51 PM  Subjective:   Admit Date: 9/12/2018  PCP: Bri Chappell [facility]m [facility] Status: Inpatient  Interval History: Hospital Day: 4, admitted with COPD exacerbation and acute on chronic systolic heart failure (EF 45-50%) improved with IV furosemide and supplemental oxygen. History of paroxysmal atrial fibrillation and atrial flutter and currently in atrial flutter. Watchman implant (Feb 2018). Awaiting clinical stabilization for six month post Watchman device evaluation. He participates with physical therapy with dyspnea with minimal exertion.       DIET RENAL; Carb Control: 5 carb choices (75 gms)/meal; Cardiac with negative 2.1 liters/24 hrs  Medications:     amoxicillin-clavulanate  1 tablet Oral BID WC   doxycycline hyclate  100 mg Oral 2 times per day   predniSONE  40 mg Oral Daily   metoprolol tartrate  50 mg Oral BID   amiodarone  100 mg Oral Daily   aspirin EC  81 mg Oral Daily   citalopram  40 mg Oral Daily   clopidogrel  75 mg Oral Daily   docusate sodium  100 mg Oral BID   cetirizine  5 mg Oral Daily   sevelamer  3,200 mg Oral TID   traZODone  150 mg Oral Nightly   levalbuterol  0.63 mg Nebulization Q4H   famotidine  20 mg Oral Daily   insulin lispro SSI   0-6 Units Subcutaneous TID WC / HS   heparin (porcine)  5,000 Units Subcutaneous 3 times per day   guaiFENesin  600 mg Oral BID     Recent Labs      09/13/18   0521  09/14/18   0537  09/15/18   0810   WBC  12.2*  15.0*  13.5*   HGB  11.4*  12.8*  13.0*   PLT  243  316  311   MCV  93.0  91.3  90.5     Recent Labs      09/13/18   0521  09/14/18   0537  09/15/18   0810   NA  133*  131*  132*   K  4.3  5.1  4.9   CL  98*  95*  95*   CO2  16*  23  21   BUN  50*  38*  70*   CREATININE  6.5*  4.5*  6.4*   GLUCOSE  159*  158*  139*     Troponin T (9/12): 0.06 ng/mL  Pro-BNP (9/12):  36,152 pg/mL  TSH: 1.01 uIU/mL      Objective:   Vitals:  /72   Pulse 97   Temp 97.5 °F (36.4 °C) (Oral)   Resp 18   Ht 5' 10\" (1.089 m)   Wt 201 lb 15.1 oz (91.6 kg)   SpO2 98%   BMI 28.98 kg/m²   General appearance: alert and cooperative with exam, lying flat without difficulty  Lungs: mostly clear, diffuse rhonchi without wheezes  Heart: irregular rate and rhythm, no appreciable mumur  Abdomen: soft, non-tender; bowel sounds normal; no masses,  no organomegaly  Extremities: extremities normal, atraumatic, no cyanosis or edema  Neurologic: No obvious focal neurologic deficits. Assessment and Plan:   1. Acute on chronic respiratory failure and COPD exacerbation:  Cough is resolving on Augmentin and doxycycline (day # 4). He is on oxygen chronically for past two years (previously off and on) at 3 LPM.  He follows with Dr. Tania Jeong as outpatient. Not yet seen by pulmonary this admission (consulting today). 2. Leukocytosis:  Feels better but WBC remains elevated despite antibiotic therapy. May be chronic. 3. Acute on chronic systolic heart failure (EF 45-50%, echo April 2018) with pulmonary edema exacerbated by ESRD on hemodialysis. 4. Atrial fibrillation / flutter:  He is continued on amiodarone for rhythm control, metoprolol tartrate 50 mg BID. He is not on anticoagulation due to bleeding with dialysis. 5. ESRD requiring hemodialysis ([facility]) for the past 8 years. Followed by Nephrology. Dry weight goal 202 lbs. 6. Chronic hyponatremia. 7. Hypertension:  Normotensive on current regimen. 8. Anemia of chronic renal disease:  Hemoglobin above threshold for TOBY. 9. Type 2 Diabetes (hemoglobin A1c 4.8%, falsely low due to dialysis). Cover with a \"sliding scale\" lispro moderate scale prandial correction insulin. 10. Insomnia:  Continues on trazodone 150 mg nightly. Works reasonably well per patient. Advance Directive: Full Code  DVT prophylaxis with heparin 5,000 units sub-Q TID. Discharge planning:   Plans to return to home (lives in [facility]) with home health care.   He has been set up with [facility] in

## 2018-09-15 NOTE — PROGRESS NOTES
exp wheezes; no rhonchi or rales   Abdomen: soft, non tender, + bowel sounds  Extremities:  No edema or cyanosis  Neuro: alert and oriented, moves all extremities equally    Data  SWETHA 4/13/2018:  Ejection fraction is visually estimated to be 45-50 %.   Mild mitral regurgitation.   Moderately dilated left atrium.   Watchman device in place. No leak around the watchman device   Moderate-to-severe tricuspid regurgitation.   The right and left atriums are moderately dilated.     Stress test 2/17/2016:  IMPRESSION:   1. No pharmacologically induced reversible perfusion defects to suggest   ischemia.  Stable fixed perfusion defect within the inferior wall either   represents old infarct or diaphragmatic attenuation -unchanged from 2012.   2. Ejection fraction of 55%     Lab Review     Renal Profile: Lab Results   Component Value Date    CREATININE 6.4 09/15/2018    BUN 70 09/15/2018     09/15/2018    K 4.9 09/15/2018    K 4.1 09/12/2018    CL 95 09/15/2018    CO2 21 09/15/2018     CBC:  Lab Results   Component Value Date    WBC 13.5 09/15/2018    RBC 4.49 09/15/2018    HGB 13.0 09/15/2018    HCT 40.7 09/15/2018    MCV 90.5 09/15/2018    RDW 19.0 09/15/2018     09/15/2018     BNP:    Lab Results   Component Value Date    .6 11/27/2012     Fasting Lipid Panel:  Lab Results   Component Value Date    CHOL 207 10/03/2012    HDL 38 10/03/2012    HDL 34 09/17/2010    TRIG 173 10/03/2012     Cardiac Enzymes:  CK/MbTroponin  Lab Results   Component Value Date    CKTOTAL 20 11/28/2012    TROPONINI 0.06 09/12/2018     PT/ INR   Lab Results   Component Value Date    INR 1.08 09/12/2018    INR 1.03 06/05/2018    INR 1.3 04/19/2018    PROTIME 12.3 09/12/2018    PROTIME 11.7 06/05/2018    PROTIME 19.1 04/13/2018     PTT No results found for: PTT Lab Results   Component Value Date    MG 2.30 09/12/2018    Lab Results   Component Value Date    TSH 2.14 10/03/2017       Assessment:  1.  Paroxysmal atrial fibrillation and

## 2018-09-15 NOTE — PROGRESS NOTES
Department of Internal Medicine  Nephrology Progress Note        SUBJECTIVE:    We are following this patient for ESRD management. The patient was seen and examined during dialysis; he feels well today with no CP, SOB, nausea or vomiting. ROS: No fever or chills. Social: No family at bedside. Physical Exam:    VITALS:  /77   Pulse 104   Temp 97.7 °F (36.5 °C)   Resp 24   Ht 5' 10\" (1.778 m)   Wt 209 lb 10.5 oz (95.1 kg)   SpO2 100%   BMI 30.08 kg/m²     General appearance: Seems comfortable, no acute distress. Neck: Trachea midline, thyroid normal.   Lungs:  Non labored breathing, CTA to anterior auscultation. Heart:  S1S2 normal, rub or gallop. No peripheral edema. Abdomen: Soft, non-tender, no organomegaly. Skin: No lesions or rashes, warm to touch. DATA:    CBC:   Lab Results   Component Value Date    WBC 13.5 09/15/2018    RBC 4.49 09/15/2018    HGB 13.0 09/15/2018    HCT 40.7 09/15/2018    MCV 90.5 09/15/2018    MCH 29.1 09/15/2018    MCHC 32.1 09/15/2018    RDW 19.0 09/15/2018     09/15/2018    MPV 7.9 09/15/2018     BMP:    Lab Results   Component Value Date     09/15/2018    K 4.9 09/15/2018    K 4.1 09/12/2018    CL 95 09/15/2018    CO2 21 09/15/2018    BUN 70 09/15/2018    LABALBU 3.4 09/15/2018    CREATININE 6.4 09/15/2018    CALCIUM 9.8 09/15/2018    GFRAA 11 09/15/2018    GFRAA 22 02/22/2013    LABGLOM 9 09/15/2018    GLUCOSE 139 09/15/2018       IMPRESSION/RECOMMENDATIONS:      1- ESRD  Patient seen and examined during dialysis  Dialysis orders reviewed with nursing staff. Patient tolerating dialysis well with no complaints. Continue current prescription with no further changes. 2- No significant electrolytes disorders noted. 3- HTN: Blood pressure within acceptable range. 4- Anemia: Hemoglobin above target for ESRD, hold TOBY and monitor.

## 2018-09-16 LAB
BASOPHILS ABSOLUTE: 0 K/UL (ref 0–0.2)
BASOPHILS RELATIVE PERCENT: 0.2 %
EOSINOPHILS ABSOLUTE: 0 K/UL (ref 0–0.6)
EOSINOPHILS RELATIVE PERCENT: 0 %
GLUCOSE BLD-MCNC: 123 MG/DL (ref 70–99)
GLUCOSE BLD-MCNC: 123 MG/DL (ref 70–99)
GLUCOSE BLD-MCNC: 150 MG/DL (ref 70–99)
GLUCOSE BLD-MCNC: 199 MG/DL (ref 70–99)
HCT VFR BLD CALC: 43.3 % (ref 40.5–52.5)
HEMOGLOBIN: 13.9 G/DL (ref 13.5–17.5)
LYMPHOCYTES ABSOLUTE: 0.6 K/UL (ref 1–5.1)
LYMPHOCYTES RELATIVE PERCENT: 5.3 %
MCH RBC QN AUTO: 29.3 PG (ref 26–34)
MCHC RBC AUTO-ENTMCNC: 32.1 G/DL (ref 31–36)
MCV RBC AUTO: 91.2 FL (ref 80–100)
MONOCYTES ABSOLUTE: 0.4 K/UL (ref 0–1.3)
MONOCYTES RELATIVE PERCENT: 3.6 %
NEUTROPHILS ABSOLUTE: 9.6 K/UL (ref 1.7–7.7)
NEUTROPHILS RELATIVE PERCENT: 90.9 %
PDW BLD-RTO: 19 % (ref 12.4–15.4)
PERFORMED ON: ABNORMAL
PLATELET # BLD: 279 K/UL (ref 135–450)
PMV BLD AUTO: 7.9 FL (ref 5–10.5)
RBC # BLD: 4.74 M/UL (ref 4.2–5.9)
WBC # BLD: 10.6 K/UL (ref 4–11)

## 2018-09-16 PROCEDURE — 6360000002 HC RX W HCPCS: Performed by: HOSPITALIST

## 2018-09-16 PROCEDURE — 36415 COLL VENOUS BLD VENIPUNCTURE: CPT

## 2018-09-16 PROCEDURE — 6370000000 HC RX 637 (ALT 250 FOR IP): Performed by: HOSPITALIST

## 2018-09-16 PROCEDURE — 6370000000 HC RX 637 (ALT 250 FOR IP): Performed by: INTERNAL MEDICINE

## 2018-09-16 PROCEDURE — 94664 DEMO&/EVAL PT USE INHALER: CPT

## 2018-09-16 PROCEDURE — 2700000000 HC OXYGEN THERAPY PER DAY

## 2018-09-16 PROCEDURE — 94640 AIRWAY INHALATION TREATMENT: CPT

## 2018-09-16 PROCEDURE — 85025 COMPLETE CBC W/AUTO DIFF WBC: CPT

## 2018-09-16 PROCEDURE — 6370000000 HC RX 637 (ALT 250 FOR IP): Performed by: NURSE PRACTITIONER

## 2018-09-16 PROCEDURE — 94761 N-INVAS EAR/PLS OXIMETRY MLT: CPT

## 2018-09-16 PROCEDURE — 99233 SBSQ HOSP IP/OBS HIGH 50: CPT | Performed by: INTERNAL MEDICINE

## 2018-09-16 PROCEDURE — 1200000000 HC SEMI PRIVATE

## 2018-09-16 RX ORDER — PREDNISONE 20 MG/1
20 TABLET ORAL DAILY
Status: DISCONTINUED | OUTPATIENT
Start: 2018-09-17 | End: 2018-09-17 | Stop reason: HOSPADM

## 2018-09-16 RX ADMIN — DOCUSATE SODIUM 100 MG: 100 CAPSULE, LIQUID FILLED ORAL at 10:26

## 2018-09-16 RX ADMIN — SEVELAMER CARBONATE 3200 MG: 800 TABLET, FILM COATED ORAL at 15:10

## 2018-09-16 RX ADMIN — HEPARIN SODIUM 5000 UNITS: 5000 INJECTION INTRAVENOUS; SUBCUTANEOUS at 05:24

## 2018-09-16 RX ADMIN — CLOPIDOGREL BISULFATE 75 MG: 75 TABLET ORAL at 10:25

## 2018-09-16 RX ADMIN — INSULIN LISPRO 1 UNITS: 100 INJECTION, SOLUTION INTRAVENOUS; SUBCUTANEOUS at 17:43

## 2018-09-16 RX ADMIN — INSULIN LISPRO 1 UNITS: 100 INJECTION, SOLUTION INTRAVENOUS; SUBCUTANEOUS at 21:53

## 2018-09-16 RX ADMIN — DOXYCYCLINE HYCLATE 100 MG: 100 TABLET, COATED ORAL at 21:52

## 2018-09-16 RX ADMIN — LEVALBUTEROL HYDROCHLORIDE: 1.25 SOLUTION, CONCENTRATE RESPIRATORY (INHALATION) at 19:48

## 2018-09-16 RX ADMIN — TRAZODONE HYDROCHLORIDE 150 MG: 50 TABLET ORAL at 21:52

## 2018-09-16 RX ADMIN — LEVALBUTEROL HYDROCHLORIDE: 1.25 SOLUTION, CONCENTRATE RESPIRATORY (INHALATION) at 12:04

## 2018-09-16 RX ADMIN — METOPROLOL TARTRATE 50 MG: 50 TABLET ORAL at 10:25

## 2018-09-16 RX ADMIN — ASPIRIN 81 MG: 81 TABLET ORAL at 10:26

## 2018-09-16 RX ADMIN — PREDNISONE 40 MG: 20 TABLET ORAL at 10:26

## 2018-09-16 RX ADMIN — AMIODARONE HYDROCHLORIDE 100 MG: 200 TABLET ORAL at 10:25

## 2018-09-16 RX ADMIN — HEPARIN SODIUM 5000 UNITS: 5000 INJECTION INTRAVENOUS; SUBCUTANEOUS at 15:10

## 2018-09-16 RX ADMIN — FAMOTIDINE 20 MG: 20 TABLET ORAL at 10:25

## 2018-09-16 RX ADMIN — GUAIFENESIN 600 MG: 600 TABLET, EXTENDED RELEASE ORAL at 10:26

## 2018-09-16 RX ADMIN — LEVALBUTEROL HYDROCHLORIDE 1.25 MG: 1.25 SOLUTION, CONCENTRATE RESPIRATORY (INHALATION) at 16:15

## 2018-09-16 RX ADMIN — SEVELAMER CARBONATE 3200 MG: 800 TABLET, FILM COATED ORAL at 10:26

## 2018-09-16 RX ADMIN — CITALOPRAM HYDROBROMIDE 40 MG: 20 TABLET ORAL at 10:25

## 2018-09-16 RX ADMIN — LEVALBUTEROL HYDROCHLORIDE 0.63 MG: 1.25 SOLUTION, CONCENTRATE RESPIRATORY (INHALATION) at 08:00

## 2018-09-16 RX ADMIN — DOXYCYCLINE HYCLATE 100 MG: 100 TABLET, COATED ORAL at 10:25

## 2018-09-16 RX ADMIN — METOPROLOL TARTRATE 50 MG: 50 TABLET ORAL at 21:53

## 2018-09-16 RX ADMIN — GUAIFENESIN 600 MG: 600 TABLET, EXTENDED RELEASE ORAL at 21:52

## 2018-09-16 RX ADMIN — DOCUSATE SODIUM 100 MG: 100 CAPSULE, LIQUID FILLED ORAL at 21:53

## 2018-09-16 RX ADMIN — LEVALBUTEROL HYDROCHLORIDE 0.63 MG: 1.25 SOLUTION, CONCENTRATE RESPIRATORY (INHALATION) at 00:02

## 2018-09-16 RX ADMIN — SEVELAMER CARBONATE 3200 MG: 800 TABLET, FILM COATED ORAL at 21:52

## 2018-09-16 NOTE — PROGRESS NOTES
effort was made to ensure accuracy; however, inadvertent computerized transcription errors may be present.

## 2018-09-16 NOTE — PROGRESS NOTES
Department of Internal Medicine  Nephrology Progress Note        SUBJECTIVE:    We are following this patient for ESRD management. The patient was seen and examined; he feels well today with no CP, SOB, nausea or vomiting. ROS: No fever or chills. Social: No family at bedside. Physical Exam:    VITALS:  /71   Pulse 74   Temp 97.6 °F (36.4 °C) (Axillary)   Resp 14   Ht 5' 10\" (1.778 m)   Wt 204 lb 8 oz (92.8 kg)   SpO2 100%   BMI 29.34 kg/m²     General appearance: Seems comfortable, no acute distress. Neck: Trachea midline, thyroid normal.   Lungs:  Non labored breathing, CTA to anterior auscultation. Heart:  S1S2 normal, rub or gallop. No peripheral edema. Abdomen: Soft, non-tender, no organomegaly. Skin: No lesions or rashes, warm to touch. DATA:    CBC:   Lab Results   Component Value Date    WBC 10.6 09/16/2018    RBC 4.74 09/16/2018    HGB 13.9 09/16/2018    HCT 43.3 09/16/2018    MCV 91.2 09/16/2018    MCH 29.3 09/16/2018    MCHC 32.1 09/16/2018    RDW 19.0 09/16/2018     09/16/2018    MPV 7.9 09/16/2018     BMP:    Lab Results   Component Value Date     09/15/2018    K 4.9 09/15/2018    K 4.1 09/12/2018    CL 95 09/15/2018    CO2 21 09/15/2018    BUN 70 09/15/2018    LABALBU 3.4 09/15/2018    CREATININE 6.4 09/15/2018    CALCIUM 9.8 09/15/2018    GFRAA 11 09/15/2018    GFRAA 22 02/22/2013    LABGLOM 9 09/15/2018    GLUCOSE 139 09/15/2018       IMPRESSION/RECOMMENDATIONS:      1- ESRD: We will continue hemodialysis per TThS schedule. 2- No significant electrolytes disorders noted. 3- HTN: Blood pressure within acceptable range. 4- Anemia: Hemoglobin above target for ESRD, hold TOBY and monitor.

## 2018-09-16 NOTE — PROGRESS NOTES
opacities likely reflect a combination of effusion, atelectasis and edema. Correlate for clinical signs of infection. Results for Lui Mccabe (MRN [id number]) as of 9/16/2018 13:22   Ref. Range 9/12/2018 02:10 9/13/2018 05:21 9/14/2018 05:37 9/15/2018 08:10 9/16/2018 05:16   WBC Latest Ref Range: 4.0 - 11.0 K/uL 11.6 (H) 12.2 (H) 15.0 (H) 13.5 (H) 10.6   RBC Latest Ref Range: 4.20 - 5.90 M/uL 4.16 (L) 3.96 (L) 4.36 4.49 4.74   Hemoglobin Quant Latest Ref Range: 13.5 - 17.5 g/dL 12.1 (L) 11.4 (L) 12.8 (L) 13.0 (L) 13.9   Hematocrit Latest Ref Range: 40.5 - 52.5 % 37.8 (L) 36.9 (L) 39.8 (L) 40.7 43.3   MCV Latest Ref Range: 80.0 - 100.0 fL 90.7 93.0 91.3 90.5 91.2   MCH Latest Ref Range: 26.0 - 34.0 pg 29.1 28.7 29.3 29.1 29.3   MCHC Latest Ref Range: 31.0 - 36.0 g/dL 32.1 30.9 (L) 32.1 32.1 32.1   MPV Latest Ref Range: 5.0 - 10.5 fL 6.9 6.9 7.5 7.9 7.9   RDW Latest Ref Range: 12.4 - 15.4 % 19.1 (H) 19.2 (H) 19.1 (H) 19.0 (H) 19.0 (H)   Platelet Count Latest Ref Range: 135 - 450 K/uL 273 243 316 311 279   Neutrophils % Latest Units: % 86.0    90.9   Lymphocyte % Latest Units: % 4.2    5.3   Monocytes % Latest Units: % 5.7    3.6     Results for Lui Mccabe (MRN [id number]) as of 9/16/2018 13:22   Ref.  Range 9/14/2018 19:29 9/15/2018 07:17 9/15/2018 08:10 9/15/2018 12:56 9/15/2018 17:15 9/15/2018 19:38 9/16/2018 07:38   Sodium Latest Ref Range: 136 - 145 mmol/L   132 (L)       Potassium Latest Ref Range: 3.5 - 5.1 mmol/L   4.9       Chloride Latest Ref Range: 99 - 110 mmol/L   95 (L)       CO2 Latest Ref Range: 21 - 32 mmol/L   21       BUN Latest Ref Range: 7 - 20 mg/dL   70 (H)       Creatinine Latest Ref Range: 0.8 - 1.3 mg/dL   6.4 (HH)       Anion Gap Latest Ref Range: 3 - 16    16       GFR Non- Latest Ref Range: >60    9 (A)       GFR  Latest Ref Range: >60    11 (A)       Glucose Latest Ref Range: 70 - 99 mg/dL   139 (H)       POC Glucose Latest Ref Range: 70 - 99

## 2018-09-17 VITALS
DIASTOLIC BLOOD PRESSURE: 77 MMHG | OXYGEN SATURATION: 100 % | SYSTOLIC BLOOD PRESSURE: 115 MMHG | RESPIRATION RATE: 16 BRPM | HEART RATE: 101 BPM | HEIGHT: 70 IN | WEIGHT: 212 LBS | BODY MASS INDEX: 30.35 KG/M2 | TEMPERATURE: 97.4 F

## 2018-09-17 LAB
BLOOD CULTURE, ROUTINE: NORMAL
GLUCOSE BLD-MCNC: 108 MG/DL (ref 70–99)
GLUCOSE BLD-MCNC: 135 MG/DL (ref 70–99)
GLUCOSE BLD-MCNC: 144 MG/DL (ref 70–99)
PERFORMED ON: ABNORMAL

## 2018-09-17 PROCEDURE — 94664 DEMO&/EVAL PT USE INHALER: CPT

## 2018-09-17 PROCEDURE — 94640 AIRWAY INHALATION TREATMENT: CPT

## 2018-09-17 PROCEDURE — 6360000002 HC RX W HCPCS: Performed by: INTERNAL MEDICINE

## 2018-09-17 PROCEDURE — 99232 SBSQ HOSP IP/OBS MODERATE 35: CPT | Performed by: INTERNAL MEDICINE

## 2018-09-17 PROCEDURE — 99233 SBSQ HOSP IP/OBS HIGH 50: CPT | Performed by: NURSE PRACTITIONER

## 2018-09-17 PROCEDURE — 6370000000 HC RX 637 (ALT 250 FOR IP): Performed by: NURSE PRACTITIONER

## 2018-09-17 PROCEDURE — 94761 N-INVAS EAR/PLS OXIMETRY MLT: CPT

## 2018-09-17 PROCEDURE — 6370000000 HC RX 637 (ALT 250 FOR IP): Performed by: INTERNAL MEDICINE

## 2018-09-17 PROCEDURE — 6360000002 HC RX W HCPCS: Performed by: HOSPITALIST

## 2018-09-17 PROCEDURE — G0008 ADMIN INFLUENZA VIRUS VAC: HCPCS | Performed by: INTERNAL MEDICINE

## 2018-09-17 PROCEDURE — 6370000000 HC RX 637 (ALT 250 FOR IP): Performed by: HOSPITALIST

## 2018-09-17 PROCEDURE — 2700000000 HC OXYGEN THERAPY PER DAY

## 2018-09-17 PROCEDURE — 90686 IIV4 VACC NO PRSV 0.5 ML IM: CPT | Performed by: INTERNAL MEDICINE

## 2018-09-17 RX ORDER — DOXYCYCLINE HYCLATE 100 MG
100 TABLET ORAL EVERY 12 HOURS SCHEDULED
Qty: 6 TABLET | Refills: 0 | Status: SHIPPED | OUTPATIENT
Start: 2018-09-17 | End: 2018-09-20

## 2018-09-17 RX ORDER — PREDNISONE 10 MG/1
TABLET ORAL
Qty: 10 TABLET | Refills: 0 | Status: SHIPPED | OUTPATIENT
Start: 2018-09-17 | End: 2019-07-31

## 2018-09-17 RX ORDER — CALCIUM CARBONATE 200(500)MG
500 TABLET,CHEWABLE ORAL 3 TIMES DAILY PRN
Status: DISCONTINUED | OUTPATIENT
Start: 2018-09-17 | End: 2018-09-17 | Stop reason: HOSPADM

## 2018-09-17 RX ORDER — METOPROLOL TARTRATE 50 MG/1
50 TABLET, FILM COATED ORAL 2 TIMES DAILY
Qty: 60 TABLET | Refills: 1 | Status: SHIPPED | OUTPATIENT
Start: 2018-09-17 | End: 2020-05-11

## 2018-09-17 RX ORDER — GUAIFENESIN 600 MG/1
600 TABLET, EXTENDED RELEASE ORAL 2 TIMES DAILY
Qty: 6 TABLET | Refills: 0 | Status: SHIPPED | OUTPATIENT
Start: 2018-09-17 | End: 2019-07-31

## 2018-09-17 RX ADMIN — INSULIN LISPRO 1 UNITS: 100 INJECTION, SOLUTION INTRAVENOUS; SUBCUTANEOUS at 13:06

## 2018-09-17 RX ADMIN — PREDNISONE 20 MG: 20 TABLET ORAL at 10:13

## 2018-09-17 RX ADMIN — LEVALBUTEROL HYDROCHLORIDE 0.63 MG: 1.25 SOLUTION, CONCENTRATE RESPIRATORY (INHALATION) at 15:41

## 2018-09-17 RX ADMIN — METOPROLOL TARTRATE 50 MG: 50 TABLET ORAL at 10:13

## 2018-09-17 RX ADMIN — HEPARIN SODIUM 5000 UNITS: 5000 INJECTION INTRAVENOUS; SUBCUTANEOUS at 05:37

## 2018-09-17 RX ADMIN — ASPIRIN 81 MG: 81 TABLET ORAL at 10:13

## 2018-09-17 RX ADMIN — SEVELAMER CARBONATE 3200 MG: 800 TABLET, FILM COATED ORAL at 17:15

## 2018-09-17 RX ADMIN — LEVALBUTEROL HYDROCHLORIDE 0.63 MG: 1.25 SOLUTION, CONCENTRATE RESPIRATORY (INHALATION) at 08:22

## 2018-09-17 RX ADMIN — LEVALBUTEROL HYDROCHLORIDE 0.63 MG: 1.25 SOLUTION, CONCENTRATE RESPIRATORY (INHALATION) at 11:52

## 2018-09-17 RX ADMIN — SEVELAMER CARBONATE 3200 MG: 800 TABLET, FILM COATED ORAL at 10:12

## 2018-09-17 RX ADMIN — DOCUSATE SODIUM 100 MG: 100 CAPSULE, LIQUID FILLED ORAL at 10:13

## 2018-09-17 RX ADMIN — GUAIFENESIN 600 MG: 600 TABLET, EXTENDED RELEASE ORAL at 10:13

## 2018-09-17 RX ADMIN — LEVALBUTEROL HYDROCHLORIDE 0.63 MG: 1.25 SOLUTION, CONCENTRATE RESPIRATORY (INHALATION) at 00:12

## 2018-09-17 RX ADMIN — CLOPIDOGREL BISULFATE 75 MG: 75 TABLET ORAL at 10:12

## 2018-09-17 RX ADMIN — INFLUENZA A VIRUS A/MICHIGAN/45/2015 X-275 (H1N1) ANTIGEN (FORMALDEHYDE INACTIVATED), INFLUENZA A VIRUS A/SINGAPORE/INFIMH-16-0019/2016 IVR-186 (H3N2) ANTIGEN (FORMALDEHYDE INACTIVATED), INFLUENZA B VIRUS B/PHUKET/3073/2013 ANTIGEN (FORMALDEHYDE INACTIVATED), AND INFLUENZA B VIRUS B/MARYLAND/15/2016 BX-69A ANTIGEN (FORMALDEHYDE INACTIVATED) 0.5 ML: 15; 15; 15; 15 INJECTION, SUSPENSION INTRAMUSCULAR at 19:41

## 2018-09-17 RX ADMIN — AMIODARONE HYDROCHLORIDE 100 MG: 200 TABLET ORAL at 10:14

## 2018-09-17 RX ADMIN — FAMOTIDINE 20 MG: 20 TABLET ORAL at 10:13

## 2018-09-17 RX ADMIN — CITALOPRAM HYDROBROMIDE 40 MG: 20 TABLET ORAL at 10:13

## 2018-09-17 RX ADMIN — LEVALBUTEROL HYDROCHLORIDE 0.63 MG: 1.25 SOLUTION, CONCENTRATE RESPIRATORY (INHALATION) at 03:57

## 2018-09-17 RX ADMIN — ACETAMINOPHEN 650 MG: 325 TABLET ORAL at 05:46

## 2018-09-17 RX ADMIN — DOXYCYCLINE HYCLATE 100 MG: 100 TABLET, COATED ORAL at 10:13

## 2018-09-17 RX ADMIN — HEPARIN SODIUM 5000 UNITS: 5000 INJECTION INTRAVENOUS; SUBCUTANEOUS at 17:16

## 2018-09-17 ASSESSMENT — PAIN SCALES - GENERAL
PAINLEVEL_OUTOF10: 4
PAINLEVEL_OUTOF10: 0

## 2018-09-17 NOTE — PROGRESS NOTES
Department of Internal Medicine  Nephrology Progress Note        SUBJECTIVE:    We are following this patient for ESRD management. The patient was seen and examined;   C/o cough w sputum    ROS: No fever or chills. Social: No family at bedside. Physical Exam:    VITALS:  /68   Pulse 93   Temp 98.2 °F (36.8 °C) (Oral)   Resp 16   Ht 5' 10\" (1.778 m)   Wt 212 lb (96.2 kg)   SpO2 98%   BMI 30.42 kg/m²     General appearance: Seems comfortable, no acute distress. Neck: Trachea midline, thyroid normal.   Lungs:  Non labored breathing, CTA to anterior auscultation. Heart:  S1S2 normal, rub or gallop. No peripheral edema. Abdomen: Soft, non-tender, no organomegaly. Skin: No lesions or rashes, warm to touch. DATA:    CBC:   Lab Results   Component Value Date    WBC 10.6 09/16/2018    RBC 4.74 09/16/2018    HGB 13.9 09/16/2018    HCT 43.3 09/16/2018    MCV 91.2 09/16/2018    MCH 29.3 09/16/2018    MCHC 32.1 09/16/2018    RDW 19.0 09/16/2018     09/16/2018    MPV 7.9 09/16/2018     BMP:    Lab Results   Component Value Date     09/15/2018    K 4.9 09/15/2018    K 4.1 09/12/2018    CL 95 09/15/2018    CO2 21 09/15/2018    BUN 70 09/15/2018    LABALBU 3.4 09/15/2018    CREATININE 6.4 09/15/2018    CALCIUM 9.8 09/15/2018    GFRAA 11 09/15/2018    GFRAA 22 02/22/2013    LABGLOM 9 09/15/2018    GLUCOSE 139 09/15/2018       IMPRESSION/RECOMMENDATIONS:      1- ESRD: We will continue hemodialysis per TThS schedule. -FR 1l/day  2-hyponatremia  3- HTN: Blood pressure within acceptable range. 4- Anemia: Hemoglobin above target for ESRD, hold TOBY and monitor.   5- cough

## 2018-09-17 NOTE — CARE COORDINATION
Patient has a referral to [facility] and goes to HD TTS at [facility], has transportation on [facility] bus. Also established home oxygen patient with [facility]. He has been to [facility] in past. He could potentially discharge home today and will need signed [facility] and [address] .
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
## 2018-09-18 ENCOUNTER — HOSPITAL ENCOUNTER (EMERGENCY)
Age: 58
Discharge: HOME | End: 2018-09-19
Payer: MEDICARE

## 2018-09-18 ENCOUNTER — APPOINTMENT (OUTPATIENT)
Dept: CT IMAGING | Age: 58
End: 2018-09-18
Payer: MEDICARE

## 2018-09-18 DIAGNOSIS — R73.9 HYPERGLYCEMIA: Primary | ICD-10-CM

## 2018-09-18 DIAGNOSIS — K80.20 CALCULUS OF GALLBLADDER WITHOUT CHOLECYSTITIS WITHOUT OBSTRUCTION: ICD-10-CM

## 2018-09-18 LAB
A/G RATIO: 1.2 (ref 1.1–2.2)
ALBUMIN SERPL-MCNC: 3.9 G/DL (ref 3.4–5)
ALP BLD-CCNC: 61 U/L (ref 40–129)
ALT SERPL-CCNC: 34 U/L (ref 10–40)
ANION GAP SERPL CALCULATED.3IONS-SCNC: 14 MMOL/L (ref 3–16)
AST SERPL-CCNC: 12 U/L (ref 15–37)
BILIRUB SERPL-MCNC: <0.2 MG/DL (ref 0–1)
BUN BLDV-MCNC: 53 MG/DL (ref 7–20)
CALCIUM SERPL-MCNC: 9.7 MG/DL (ref 8.3–10.6)
CHLORIDE BLD-SCNC: 91 MMOL/L (ref 99–110)
CHP ED QC CHECK: NORMAL
CO2: 26 MMOL/L (ref 21–32)
CREAT SERPL-MCNC: 5.5 MG/DL (ref 0.8–1.3)
GFR AFRICAN AMERICAN: 13
GFR NON-AFRICAN AMERICAN: 11
GLOBULIN: 3.2 G/DL
GLUCOSE BLD-MCNC: 154 MG/DL (ref 70–99)
GLUCOSE BLD-MCNC: 163 MG/DL
GLUCOSE BLD-MCNC: 163 MG/DL (ref 70–99)
GLUCOSE BLD-MCNC: 182 MG/DL (ref 70–99)
HCT VFR BLD CALC: 45.1 % (ref 40.5–52.5)
HEMOGLOBIN: 14.3 G/DL (ref 13.5–17.5)
LIPASE: 106 U/L (ref 13–60)
MCH RBC QN AUTO: 28.6 PG (ref 26–34)
MCHC RBC AUTO-ENTMCNC: 31.6 G/DL (ref 31–36)
MCV RBC AUTO: 90.4 FL (ref 80–100)
PDW BLD-RTO: 19 % (ref 12.4–15.4)
PERFORMED ON: ABNORMAL
PERFORMED ON: ABNORMAL
PLATELET # BLD: 251 K/UL (ref 135–450)
PMV BLD AUTO: 7.7 FL (ref 5–10.5)
POTASSIUM SERPL-SCNC: 4.7 MMOL/L (ref 3.5–5.1)
RBC # BLD: 4.99 M/UL (ref 4.2–5.9)
SODIUM BLD-SCNC: 131 MMOL/L (ref 136–145)
SPECIMEN STATUS: NORMAL
TOTAL PROTEIN: 7.1 G/DL (ref 6.4–8.2)
WBC # BLD: 13.9 K/UL (ref 4–11)

## 2018-09-18 PROCEDURE — 80053 COMPREHEN METABOLIC PANEL: CPT

## 2018-09-18 PROCEDURE — 85027 COMPLETE CBC AUTOMATED: CPT

## 2018-09-18 PROCEDURE — 99285 EMERGENCY DEPT VISIT HI MDM: CPT

## 2018-09-18 PROCEDURE — 74176 CT ABD & PELVIS W/O CONTRAST: CPT

## 2018-09-18 PROCEDURE — 83605 ASSAY OF LACTIC ACID: CPT

## 2018-09-18 PROCEDURE — 83690 ASSAY OF LIPASE: CPT

## 2018-09-18 RX ORDER — 0.9 % SODIUM CHLORIDE 0.9 %
1000 INTRAVENOUS SOLUTION INTRAVENOUS ONCE
Status: DISCONTINUED | OUTPATIENT
Start: 2018-09-18 | End: 2018-09-18

## 2018-09-18 RX ORDER — CLOPIDOGREL BISULFATE 75 MG/1
TABLET ORAL
Qty: 90 TABLET | Refills: 0 | Status: SHIPPED | OUTPATIENT
Start: 2018-09-18 | End: 2018-12-18 | Stop reason: SDUPTHER

## 2018-09-18 NOTE — PROGRESS NOTES
Pt d/c'd home. Removed peripheral IV and stopped bleeding. Catheter intact. Pt tolerated well. No redness noted at site. Notified [facility] and removed tele box. Reviewed d/c instructions, home meds, and  f/u information utilizing teach-back method. Patient verbalized understanding. Patient discharged with all belongings. (0) understands/communicates without difficulty

## 2018-09-19 ENCOUNTER — OFFICE VISIT (OUTPATIENT)
Dept: PULMONOLOGY | Age: 58
End: 2018-09-19

## 2018-09-19 ENCOUNTER — TELEPHONE (OUTPATIENT)
Dept: OTHER | Facility: CLINIC | Age: 58
End: 2018-09-19

## 2018-09-19 ENCOUNTER — TELEPHONE (OUTPATIENT)
Dept: TELEMETRY | Age: 58
End: 2018-09-19

## 2018-09-19 VITALS
HEIGHT: 70 IN | TEMPERATURE: 98.9 F | HEART RATE: 88 BPM | DIASTOLIC BLOOD PRESSURE: 71 MMHG | BODY MASS INDEX: 30.35 KG/M2 | OXYGEN SATURATION: 95 % | RESPIRATION RATE: 18 BRPM | WEIGHT: 212 LBS | SYSTOLIC BLOOD PRESSURE: 122 MMHG

## 2018-09-19 VITALS
HEIGHT: 70 IN | SYSTOLIC BLOOD PRESSURE: 123 MMHG | TEMPERATURE: 98 F | HEART RATE: 91 BPM | DIASTOLIC BLOOD PRESSURE: 74 MMHG | WEIGHT: 212 LBS | BODY MASS INDEX: 30.35 KG/M2 | RESPIRATION RATE: 15 BRPM | OXYGEN SATURATION: 98 %

## 2018-09-19 DIAGNOSIS — R06.00 DYSPNEA, UNSPECIFIED TYPE: Primary | ICD-10-CM

## 2018-09-19 DIAGNOSIS — J96.11 CHRONIC HYPOXEMIC RESPIRATORY FAILURE (HCC): ICD-10-CM

## 2018-09-19 DIAGNOSIS — R06.81 WITNESSED APNEIC SPELLS: ICD-10-CM

## 2018-09-19 DIAGNOSIS — R06.83 SNORING: ICD-10-CM

## 2018-09-19 DIAGNOSIS — R93.89 ABNORMAL CT OF THE CHEST: ICD-10-CM

## 2018-09-19 DIAGNOSIS — G47.10 HYPERSOMNIA: ICD-10-CM

## 2018-09-19 DIAGNOSIS — I27.20 PULMONARY HTN (HCC): ICD-10-CM

## 2018-09-19 LAB — LACTIC ACID: 1.1 MMOL/L (ref 0.4–2)

## 2018-09-19 PROCEDURE — G8417 CALC BMI ABV UP PARAM F/U: HCPCS | Performed by: INTERNAL MEDICINE

## 2018-09-19 PROCEDURE — G8427 DOCREV CUR MEDS BY ELIG CLIN: HCPCS | Performed by: INTERNAL MEDICINE

## 2018-09-19 PROCEDURE — G8598 ASA/ANTIPLAT THER USED: HCPCS | Performed by: INTERNAL MEDICINE

## 2018-09-19 PROCEDURE — 1111F DSCHRG MED/CURRENT MED MERGE: CPT | Performed by: INTERNAL MEDICINE

## 2018-09-19 PROCEDURE — 3017F COLORECTAL CA SCREEN DOC REV: CPT | Performed by: INTERNAL MEDICINE

## 2018-09-19 PROCEDURE — 99214 OFFICE O/P EST MOD 30 MIN: CPT | Performed by: INTERNAL MEDICINE

## 2018-09-19 PROCEDURE — 1036F TOBACCO NON-USER: CPT | Performed by: INTERNAL MEDICINE

## 2018-09-19 ASSESSMENT — SLEEP AND FATIGUE QUESTIONNAIRES
HOW LIKELY ARE YOU TO NOD OFF OR FALL ASLEEP WHILE WATCHING TV: 0
HOW LIKELY ARE YOU TO NOD OFF OR FALL ASLEEP WHILE SITTING AND READING: 0
HOW LIKELY ARE YOU TO NOD OFF OR FALL ASLEEP WHILE LYING DOWN TO REST IN THE AFTERNOON WHEN CIRCUMSTANCES PERMIT: 3
HOW LIKELY ARE YOU TO NOD OFF OR FALL ASLEEP IN A CAR, WHILE STOPPED FOR A FEW MINUTES IN TRAFFIC: 0
HOW LIKELY ARE YOU TO NOD OFF OR FALL ASLEEP WHILE SITTING QUIETLY AFTER LUNCH WITHOUT ALCOHOL: 3
HOW LIKELY ARE YOU TO NOD OFF OR FALL ASLEEP WHEN YOU ARE A PASSENGER IN A CAR FOR AN HOUR WITHOUT A BREAK: 2
HOW LIKELY ARE YOU TO NOD OFF OR FALL ASLEEP WHILE SITTING AND TALKING TO SOMEONE: 0
ESS TOTAL SCORE: 8
HOW LIKELY ARE YOU TO NOD OFF OR FALL ASLEEP WHILE SITTING INACTIVE IN A PUBLIC PLACE: 0
NECK CIRCUMFERENCE (INCHES): 18

## 2018-09-19 NOTE — PROGRESS NOTES
into the lungs continuous With concentrator and portability), Disp: 1 each, Rfl: 0    traZODone (DESYREL) 150 MG tablet, Take 150 mg by mouth nightly., Disp: , Rfl:     sevelamer (RENVELA) 800 MG tablet, Take 4 tablets by mouth 3 times daily , Disp: , Rfl:       Objective:   Physical Exam  /71   Pulse 88   Temp 98.9 °F (37.2 °C)   Resp 18   Ht 5' 10\" (1.778 m)   Wt 212 lb (96.2 kg)   SpO2 95% Comment: 1liter  BMI 30.42 kg/m²  1LPM   Gen: No distress. Obese. 31.91. Ill-appearing on wheelchair  Eyes: Left PERRL. No sclera icterus. No conjunctival injection. Left eye blindness. ENT: No discharge. Pharynx clear. Neck: Trachea midline. No obvious mass. Resp: No accessory muscle use. Basilar crackles. No wheezes. Few rhonchi. Basilar dullness on percussion. Decreased air entry. CV: Regular rate. Irregular rhythm. No murmur or rub. No significant edema. GI: Non-tender. Non-distended. No hernia. Skin: Warm and dry. No nodule on exposed extremities. Lymph: No cervical LAD. No supraclavicular LAD. M/S: No cyanosis. No joint deformity. No clubbing. Right arm fistula. Neuro: Awake. Alert. Moves all four extremities. Psych: Oriented x 3. No anxiety. DATA reviewed by me:     CTPA 6/5/18  Negative PE  Bilateral small to moderate pleural effusion  Large basilar round atelectasis      Chest ultrasound 6/15/18   small amount of pleural effusion poorly visualized    Chest x-ray 9/12/18  bilateral pleural effusion with basilar airspace disease-no changes    Echo 4/13/2018    Ejection fraction is visually estimated to be 45-50 %.   Mild mitral regurgitation.   Moderately dilated left atrium.   Watchman device in place. No leak around the watchman device   Moderate-to-severe tricuspid regurgitation.   The right and left atriums are moderately dilated.     Assessment:       · Snoring, witnessed apnea and hypersomnia   · Chronic hypoxemic respiratory failure  · Dyspnea- Multi-factorial secondary to underlying parenchymal lung disease, CAD/cardiomyopathy, deconditioning and obesity   · Abnormal CT chest 6/5/2018- favor rounded atelectasis and pleuroparenchymal changes related to prior infection  · MSSA Empyema required chest tube 2/13/15-2/25/2015 and DNase/tPA complicated by Hemothorax and mucus plug lung collapse. Recurrence required CT placement 3/19/2015. Declined for decortication by CT surgery. Completed 6 months of Abx 4/2016. · Left sided effusion on CT chest 12/13/2015. Thoracentesis 12/28/2015 500 cc. Negative Cx P 2.5 LD 62 57% Neutrophils. Negative cytology and flow cytometry. Likely was volume overlaod  · Latent TB- Completed 4 drugs TB treatment 1/2/2013 with the thought that the pleural effusion is TB effusion. · Recurrent lymphocytic right pleural effusion. Completed 4 drug TB treatment. VATS pleurodesis with pleural biopsy 2/18/13 negative for malignancy, dense fibrosis with chronic inflammation  · ESRD on HD via right arm fistula  · Moderate MR, mild to moderate TR  · Pulmonary hypertension/CAD/cardiomyopathy and Atrial fibrillation on amiodarone   · No significant smoking history       Plan:       Split night evaluate and treat for sleep related breathing disorder. Treatment options were discussed with patient if PSG reveals MARLEEN, including CPAP therapy, oral appliances, upper airway surgery and hypoglossal nerve stimulation. Patient is in favor of CPAP therapy. Discussed with patient the pathophysiology of apnea. Sleep hygiene  Avoid sedatives, alcohol and caffeinated drinks at bed time. No driving motorized vehicles or operating heavy machinery while fatigue, drowsy or sleepy. Weight loss is also recommended as a long-term intervention. Complications of MARLEEN if not treated were discussed with patient patient to include systemic hypertension, pulmonary hypertension, cardiovascular morbidities, car accidents and all cause mortality.   6MW  PFTs   DuoNeb  INH/NEB QID PRN   Advised to titrate O2 using her pulse oximeter- target O2 sat 90-92%

## 2018-09-20 NOTE — ED PROVIDER NOTES
100 MG CAPS Take 100 mg by mouth 2 times daily      amiodarone (PACERONE) 100 MG tablet Take 1 tablet by mouth daily 30 tablet 3    citalopram (CELEXA) 40 MG tablet Take 1 tablet by mouth daily 30 tablet 0    aspirin EC 81 MG EC tablet Take 1 tablet by mouth daily 90 tablet 3    OXYGEN Inhale 2 L into the lungs continuous With concentrator and portability (Patient taking differently: Inhale 3 L into the lungs continuous With concentrator and portability) 1 each 0    traZODone (DESYREL) 150 MG tablet Take 150 mg by mouth nightly.  sevelamer (RENVELA) 800 MG tablet Take 4 tablets by mouth 3 times daily        Allergies   Allergen Reactions    Bee Pollen     Codeine Swelling       REVIEW OF SYSTEMS  10 systems reviewed, pertinent positives per HPI otherwise noted to be negative    PHYSICAL EXAM  /74   Pulse 91   Temp 98 °F (36.7 °C)   Resp 15   Ht 5' 10\" (1.778 m)   Wt 212 lb (96.2 kg)   SpO2 98%   BMI 30.42 kg/m²   GENERAL APPEARANCE: Awake and alert. Cooperative. No acute distress. HEAD: Normocephalic. Atraumatic. EYES: PERRL. EOM's grossly intact. ENT: Mucous membranes are moist.   NECK: Supple. HEART: RRR. No murmurs. LUNGS: Respirations unlabored. CTAB. Good air exchange. Speaking comfortably in full sentences. ABDOMEN: Soft. Non-distended. Non-tender. No guarding or rebound. Negative Mendoza's, McBurney's, Rovsing's. No fluid wave or ascites. No hernias or masses. Bowel sounds normal quadrants. No CVA tenderness. EXTREMITIES: No peripheral edema. Moves all extremities equally. All extremities neurovascularly intact. SKIN: Warm and dry. No acute rashes. NEUROLOGICAL: Alert and oriented. CN's 2-12 intact. No gross facial drooping. Strength 5/5, sensation intact. PSYCHIATRIC: Normal mood and affect.     RADIOLOGY  Ct Abdomen Pelvis Wo Contrast Additional Contrast? None    Result Date: 9/18/2018  EXAMINATION: CT OF THE ABDOMEN AND PELVIS WITHOUT CONTRAST 9/18/2018 10:57 pm TECHNIQUE: CT of the abdomen and pelvis was performed without the administration of intravenous contrast. Multiplanar reformatted images are provided for review. Dose modulation, iterative reconstruction, and/or weight based adjustment of the mA/kV was utilized to reduce the radiation dose to as low as reasonably achievable. COMPARISON: Chest CT from 06/05/2018 HISTORY: ORDERING SYSTEM PROVIDED HISTORY: abd pain TECHNOLOGIST PROVIDED HISTORY: Additional Contrast?->None Ordering Physician Provided Reason for Exam: Has experienced some lower abdominal pain; Nausea; Hyperglycemia Acuity: Acute Type of Exam: Initial Relevant Medical/Surgical History: History of chronic kidney disease FINDINGS: Lower Chest: Subpleural fat deposition is again seen bilaterally with chronic bilateral pleural effusions. The right pleural fluid collection demonstrates a thick wall while there is a thin wall for the left pleural fluid collection. Round atelectasis is seen in both lower lobes. Organs: Evaluation is limited by the lack of intravenous contrast. Cholelithiasis is identified with possible stones in the cystic or common bile duct. There is no pericholecystic inflammation. The liver, spleen, pancreas and adrenal glands are grossly negative. There is bilateral renal atrophy without hydronephrosis. There are faint calcifications in both kidneys. GI/Bowel: Small bowel caliber is normal.  The appendix is normal.  The colon is unremarkable. Pelvis: The bladder is empty and therefore limited in evaluation. Peritoneum/Retroperitoneum: No adenopathy, mesenteric stranding or free fluid. Aortic caliber is normal. Bones/Soft Tissues: No acute findings. Cholelithiasis and possible choledocholithiasis without CT evidence for acute cholecystitis or other acute process.      Xr Chest Portable    Result Date: 9/12/2018  EXAMINATION: SINGLE XRAY VIEW OF THE CHEST 9/12/2018 1:51 am COMPARISON: Chest radiographs 09/11/2018, 07/24/2018 HISTORY: ORDERING SYSTEM PROVIDED HISTORY: Shortness of breath TECHNOLOGIST PROVIDED HISTORY: Reason for exam:->Shortness of breath Ordering Physician Provided Reason for Exam: SOB Acuity: Unknown Type of Exam: Unknown FINDINGS: No substantial change in effusions, pulmonary edema and basilar opacities. Stable cardiomediastinal contours. No pneumothorax. No acute interval change. Xr Chest Portable    Result Date: 9/11/2018  EXAMINATION: SINGLE XRAY VIEW OF THE CHEST 9/11/2018 2:35 am COMPARISON: Chest radiograph 07/24/2018 HISTORY: ORDERING SYSTEM PROVIDED HISTORY: dyspnea, cough TECHNOLOGIST PROVIDED HISTORY: Reason for exam:->dyspnea, cough Ordering Physician Provided Reason for Exam: Shortness of Breath (pt arrives to room 10 via ems c/o sob, ems states pt wasin 80's on 3l at home which pt always wears, ems states pt bp 183/106 hr 102, fsbs 92, ems states pt was up to 99% on the btx enroute) Type of Exam: Initial Relevant Medical/Surgical History: HTN CHF AFIB COPD FINDINGS: Large bilateral pleural effusions with basilar opacities and background of pulmonary edema. Cardiac silhouette is partially obscured but appears mildly enlarged similar to prior exam.  No pneumothorax. Intact skeleton. Features of heart failure, including large bilateral effusions and pulmonary edema. Basilar opacities likely reflect a combination of effusion, atelectasis and edema. Correlate for clinical signs of infection. ED COURSE   I have evaluated this patient. Pain control was not required here in the emergency department. Triage vital slight tachycardia with rate 101. Point-of-care glucose was 154. CBC Albertina is a ptosis at 13.9 with left shift and no bands. Lactic acid negative. CMP appears consistent with chronic renal failure with elevated BUN and creatinine. Normal potassium. Lipase 106. CT abdomen and pelvis showing cholelithiasis and A  possible choledocholithiasis.   No evidence to suggest acute

## 2018-09-26 ENCOUNTER — HOSPITAL ENCOUNTER (OUTPATIENT)
Dept: PULMONOLOGY | Age: 58
Discharge: HOME | End: 2018-09-26
Payer: MEDICARE

## 2018-09-26 PROCEDURE — 94664 DEMO&/EVAL PT USE INHALER: CPT

## 2018-09-26 PROCEDURE — 94727 GAS DIL/WSHOT DETER LNG VOL: CPT

## 2018-09-26 PROCEDURE — 94060 EVALUATION OF WHEEZING: CPT

## 2018-09-26 PROCEDURE — 94618 PULMONARY STRESS TESTING: CPT

## 2018-09-26 PROCEDURE — 94729 DIFFUSING CAPACITY: CPT

## 2018-09-26 PROCEDURE — 94640 AIRWAY INHALATION TREATMENT: CPT

## 2018-09-26 PROCEDURE — 6360000002 HC RX W HCPCS: Performed by: INTERNAL MEDICINE

## 2018-09-26 RX ORDER — ALBUTEROL SULFATE 2.5 MG/3ML
2.5 SOLUTION RESPIRATORY (INHALATION) ONCE
Status: COMPLETED | OUTPATIENT
Start: 2018-09-26 | End: 2018-09-26

## 2018-09-26 RX ADMIN — ALBUTEROL SULFATE 2.5 MG: 2.5 SOLUTION RESPIRATORY (INHALATION) at 11:33

## 2018-09-27 DIAGNOSIS — J44.9 CHRONIC OBSTRUCTIVE PULMONARY DISEASE, UNSPECIFIED COPD TYPE (HCC): Primary | ICD-10-CM

## 2018-10-01 ENCOUNTER — OFFICE VISIT (OUTPATIENT)
Dept: CARDIOLOGY CLINIC | Age: 58
End: 2018-10-01
Payer: MEDICARE

## 2018-10-01 VITALS
WEIGHT: 208.5 LBS | OXYGEN SATURATION: 95 % | BODY MASS INDEX: 29.85 KG/M2 | DIASTOLIC BLOOD PRESSURE: 62 MMHG | SYSTOLIC BLOOD PRESSURE: 118 MMHG | HEIGHT: 70 IN | HEART RATE: 54 BPM

## 2018-10-01 DIAGNOSIS — I50.32 CHRONIC DIASTOLIC (CONGESTIVE) HEART FAILURE (HCC): Chronic | ICD-10-CM

## 2018-10-01 DIAGNOSIS — I48.0 PAROXYSMAL ATRIAL FIBRILLATION (HCC): Primary | Chronic | ICD-10-CM

## 2018-10-01 DIAGNOSIS — I48.3 TYPICAL ATRIAL FLUTTER (HCC): ICD-10-CM

## 2018-10-01 PROCEDURE — 93000 ELECTROCARDIOGRAM COMPLETE: CPT | Performed by: NURSE PRACTITIONER

## 2018-10-01 PROCEDURE — G8482 FLU IMMUNIZE ORDER/ADMIN: HCPCS | Performed by: NURSE PRACTITIONER

## 2018-10-01 PROCEDURE — G8417 CALC BMI ABV UP PARAM F/U: HCPCS | Performed by: NURSE PRACTITIONER

## 2018-10-01 PROCEDURE — G8598 ASA/ANTIPLAT THER USED: HCPCS | Performed by: NURSE PRACTITIONER

## 2018-10-01 PROCEDURE — 1036F TOBACCO NON-USER: CPT | Performed by: NURSE PRACTITIONER

## 2018-10-01 PROCEDURE — 1111F DSCHRG MED/CURRENT MED MERGE: CPT | Performed by: NURSE PRACTITIONER

## 2018-10-01 PROCEDURE — 99214 OFFICE O/P EST MOD 30 MIN: CPT | Performed by: NURSE PRACTITIONER

## 2018-10-01 PROCEDURE — 3017F COLORECTAL CA SCREEN DOC REV: CPT | Performed by: NURSE PRACTITIONER

## 2018-10-01 PROCEDURE — G8427 DOCREV CUR MEDS BY ELIG CLIN: HCPCS | Performed by: NURSE PRACTITIONER

## 2018-10-01 NOTE — LETTER
OXYGEN Inhale 2 L into the lungs continuous With concentrator and portability  Patient taking differently: Inhale 3 L into the lungs continuous With concentrator and portability 2/23/16  Yes Mariangel Nieves MD   traZODone (DESYREL) 150 MG tablet Take 150 mg by mouth nightly. Yes Historical Provider, MD   sevelamer (RENVELA) 800 MG tablet Take 4 tablets by mouth 3 times daily    Yes Historical Provider, MD       Allergies:  Bee pollen and Codeine    Social History:   reports that he quit smoking about 41 years ago. His smoking use included Cigarettes. He has a 4.50 pack-year smoking history. He has never used smokeless tobacco. He reports that he does not drink alcohol or use drugs. Family History: family history includes Cancer in his mother and sister; Diabetes in his father. Review of Systems   Constitutional: Negative. HENT: + vision loss  Eyes: Negative. Respiratory: + chronic SOB, on home O2  Cardiovascular: see HPI  Gastrointestinal: Negative. Genitourinary: Negative. Musculoskeletal: Negative. Skin: Negative. Neurological: Negative. Hematological: Negative. Psychiatric/Behavioral: Negative. PHYSICAL EXAM:    Physical Examination:    /62   Pulse 54   Ht 5' 10\" (1.778 m)   Wt 208 lb 8 oz (94.6 kg)   SpO2 95% Comment: 2 lt  BMI 29.92 kg/m²       Constitutional and general appearance: fatigued, cooperative, no distress and appears older than stated age  HEENT: PERRL, no cervical lymphadenopathy. No masses palpable.  Normal oral mucosa  Respiratory:  · Normal excursion and expansion without use of accessory muscles  · Resp auscultation: Bilateral breath sounds diminished, no wheezing/rhonchi/rales  Cardiovascular:  · The apical impulse is not displaced  · Heart tones are crisp and normal. regular S1 and S2.  · Jugular venous pulsation Normal  · The carotid upstroke is normal in amplitude and contour without delay or bruit  · Peripheral pulses are symmetrical and full 2. TSH and LFT every 6 months while on amiodarone (due 3/2019)  3. SWETHA was due in 8/2018 per Watchman protocol but was delayed due to hospitalization. OK to schedule now. Will stop Plavix depending on SWETHA results (risks, benefits, and alternative therapy discussed). 4. Follow up after procedure    KINDRA Zaragoza  [facility]  [phone]     If you have questions, please do not hesitate to call me. I look forward to following Anabelle Pierre along with you.     Sincerely,        LASHAUN Zaragoza CNP

## 2018-10-04 ENCOUNTER — TELEPHONE (OUTPATIENT)
Dept: CARDIOLOGY CLINIC | Age: 58
End: 2018-10-04

## 2018-10-18 ENCOUNTER — HOSPITAL ENCOUNTER (OUTPATIENT)
Dept: SLEEP CENTER | Age: 58
Discharge: HOME | End: 2018-10-20
Payer: MEDICARE

## 2018-10-18 DIAGNOSIS — G47.10 HYPERSOMNIA: ICD-10-CM

## 2018-10-18 DIAGNOSIS — R06.83 SNORING: ICD-10-CM

## 2018-10-18 DIAGNOSIS — R06.81 WITNESSED APNEIC SPELLS: ICD-10-CM

## 2018-10-18 PROCEDURE — 95811 POLYSOM 6/>YRS CPAP 4/> PARM: CPT

## 2018-11-06 ENCOUNTER — TELEPHONE (OUTPATIENT)
Dept: PULMONOLOGY | Age: 58
End: 2018-11-06

## 2018-11-26 RX ORDER — AMIODARONE HYDROCHLORIDE 100 MG/1
100 TABLET ORAL DAILY
Qty: 30 TABLET | Refills: 3 | Status: SHIPPED | OUTPATIENT
Start: 2018-11-26 | End: 2018-12-04 | Stop reason: SDUPTHER

## 2018-12-04 RX ORDER — AMIODARONE HYDROCHLORIDE 100 MG/1
100 TABLET ORAL DAILY
Qty: 30 TABLET | Refills: 3 | Status: SHIPPED | OUTPATIENT
Start: 2018-12-04 | End: 2019-04-25 | Stop reason: SDUPTHER

## 2018-12-19 ENCOUNTER — TELEPHONE (OUTPATIENT)
Dept: PULMONOLOGY | Age: 58
End: 2018-12-19

## 2019-01-03 ENCOUNTER — HOSPITAL ENCOUNTER (INPATIENT)
Age: 59
LOS: 1 days | Discharge: HOME | DRG: 313 | End: 2019-01-05
Attending: EMERGENCY MEDICINE | Admitting: FAMILY MEDICINE
Payer: MEDICARE

## 2019-01-03 ENCOUNTER — APPOINTMENT (OUTPATIENT)
Dept: GENERAL RADIOLOGY | Age: 59
DRG: 313 | End: 2019-01-03
Payer: MEDICARE

## 2019-01-03 DIAGNOSIS — I20.8 OTHER FORMS OF ANGINA PECTORIS (HCC): Primary | ICD-10-CM

## 2019-01-03 DIAGNOSIS — Z99.2 ESRD ON DIALYSIS (HCC): ICD-10-CM

## 2019-01-03 DIAGNOSIS — N18.6 ESRD ON DIALYSIS (HCC): ICD-10-CM

## 2019-01-03 DIAGNOSIS — R94.31 NONSPECIFIC ST-T WAVE ELECTROCARDIOGRAPHIC CHANGES: ICD-10-CM

## 2019-01-03 PROBLEM — R07.9 CHEST PAIN: Status: ACTIVE | Noted: 2019-01-03

## 2019-01-03 LAB
A/G RATIO: 1 (ref 1.1–2.2)
ALBUMIN SERPL-MCNC: 3.3 G/DL (ref 3.4–5)
ALP BLD-CCNC: 72 U/L (ref 40–129)
ALT SERPL-CCNC: 11 U/L (ref 10–40)
ANION GAP SERPL CALCULATED.3IONS-SCNC: 12 MMOL/L (ref 3–16)
APTT: 33.1 SEC (ref 26–36)
APTT: 33.3 SEC (ref 26–36)
AST SERPL-CCNC: 13 U/L (ref 15–37)
BILIRUB SERPL-MCNC: <0.2 MG/DL (ref 0–1)
BUN BLDV-MCNC: 37 MG/DL (ref 7–20)
CALCIUM SERPL-MCNC: 9 MG/DL (ref 8.3–10.6)
CHLORIDE BLD-SCNC: 98 MMOL/L (ref 99–110)
CO2: 27 MMOL/L (ref 21–32)
CREAT SERPL-MCNC: 6.4 MG/DL (ref 0.8–1.3)
EKG ATRIAL RATE: 70 BPM
EKG DIAGNOSIS: NORMAL
EKG Q-T INTERVAL: 418 MS
EKG QRS DURATION: 72 MS
EKG QTC CALCULATION (BAZETT): 451 MS
EKG R AXIS: 42 DEGREES
EKG T AXIS: 51 DEGREES
EKG VENTRICULAR RATE: 70 BPM
GFR AFRICAN AMERICAN: 11
GFR NON-AFRICAN AMERICAN: 9
GLOBULIN: 3.4 G/DL
GLUCOSE BLD-MCNC: 82 MG/DL (ref 70–99)
HCT VFR BLD CALC: 35.5 % (ref 40.5–52.5)
HCT VFR BLD CALC: 37.7 % (ref 40.5–52.5)
HEMOGLOBIN: 11.5 G/DL (ref 13.5–17.5)
HEMOGLOBIN: 12.2 G/DL (ref 13.5–17.5)
MCH RBC QN AUTO: 31.3 PG (ref 26–34)
MCH RBC QN AUTO: 31.4 PG (ref 26–34)
MCHC RBC AUTO-ENTMCNC: 32.3 G/DL (ref 31–36)
MCHC RBC AUTO-ENTMCNC: 32.4 G/DL (ref 31–36)
MCV RBC AUTO: 96.9 FL (ref 80–100)
MCV RBC AUTO: 97 FL (ref 80–100)
PDW BLD-RTO: 15.7 % (ref 12.4–15.4)
PDW BLD-RTO: 15.8 % (ref 12.4–15.4)
PLATELET # BLD: 249 K/UL (ref 135–450)
PLATELET # BLD: 261 K/UL (ref 135–450)
PMV BLD AUTO: 6.6 FL (ref 5–10.5)
PMV BLD AUTO: 6.8 FL (ref 5–10.5)
POTASSIUM SERPL-SCNC: 4.2 MMOL/L (ref 3.5–5.1)
PRO-BNP: ABNORMAL PG/ML (ref 0–124)
RBC # BLD: 3.66 M/UL (ref 4.2–5.9)
RBC # BLD: 3.88 M/UL (ref 4.2–5.9)
SODIUM BLD-SCNC: 137 MMOL/L (ref 136–145)
TOTAL PROTEIN: 6.7 G/DL (ref 6.4–8.2)
TROPONIN: 0.05 NG/ML
TSH REFLEX FT4: 3.98 UIU/ML (ref 0.27–4.2)
WBC # BLD: 6.1 K/UL (ref 4–11)
WBC # BLD: 7.1 K/UL (ref 4–11)

## 2019-01-03 PROCEDURE — 85730 THROMBOPLASTIN TIME PARTIAL: CPT

## 2019-01-03 PROCEDURE — 94761 N-INVAS EAR/PLS OXIMETRY MLT: CPT

## 2019-01-03 PROCEDURE — 93010 ELECTROCARDIOGRAM REPORT: CPT | Performed by: INTERNAL MEDICINE

## 2019-01-03 PROCEDURE — 71046 X-RAY EXAM CHEST 2 VIEWS: CPT

## 2019-01-03 PROCEDURE — G0378 HOSPITAL OBSERVATION PER HR: HCPCS

## 2019-01-03 PROCEDURE — 80074 ACUTE HEPATITIS PANEL: CPT

## 2019-01-03 PROCEDURE — 80053 COMPREHEN METABOLIC PANEL: CPT

## 2019-01-03 PROCEDURE — 6370000000 HC RX 637 (ALT 250 FOR IP): Performed by: EMERGENCY MEDICINE

## 2019-01-03 PROCEDURE — 36415 COLL VENOUS BLD VENIPUNCTURE: CPT

## 2019-01-03 PROCEDURE — 85027 COMPLETE CBC AUTOMATED: CPT

## 2019-01-03 PROCEDURE — 93005 ELECTROCARDIOGRAM TRACING: CPT | Performed by: PHYSICIAN ASSISTANT

## 2019-01-03 PROCEDURE — 6370000000 HC RX 637 (ALT 250 FOR IP): Performed by: INTERNAL MEDICINE

## 2019-01-03 PROCEDURE — 83880 ASSAY OF NATRIURETIC PEPTIDE: CPT

## 2019-01-03 PROCEDURE — 6370000000 HC RX 637 (ALT 250 FOR IP): Performed by: STUDENT IN AN ORGANIZED HEALTH CARE EDUCATION/TRAINING PROGRAM

## 2019-01-03 PROCEDURE — 96374 THER/PROPH/DIAG INJ IV PUSH: CPT

## 2019-01-03 PROCEDURE — 96372 THER/PROPH/DIAG INJ SC/IM: CPT

## 2019-01-03 PROCEDURE — 99222 1ST HOSP IP/OBS MODERATE 55: CPT | Performed by: INTERNAL MEDICINE

## 2019-01-03 PROCEDURE — 6370000000 HC RX 637 (ALT 250 FOR IP): Performed by: PHYSICIAN ASSISTANT

## 2019-01-03 PROCEDURE — 2580000003 HC RX 258: Performed by: STUDENT IN AN ORGANIZED HEALTH CARE EDUCATION/TRAINING PROGRAM

## 2019-01-03 PROCEDURE — 86706 HEP B SURFACE ANTIBODY: CPT

## 2019-01-03 PROCEDURE — 6360000002 HC RX W HCPCS: Performed by: PHYSICIAN ASSISTANT

## 2019-01-03 PROCEDURE — 84443 ASSAY THYROID STIM HORMONE: CPT

## 2019-01-03 PROCEDURE — 84484 ASSAY OF TROPONIN QUANT: CPT

## 2019-01-03 PROCEDURE — 2700000000 HC OXYGEN THERAPY PER DAY

## 2019-01-03 PROCEDURE — 6360000002 HC RX W HCPCS: Performed by: NURSE PRACTITIONER

## 2019-01-03 PROCEDURE — 99285 EMERGENCY DEPT VISIT HI MDM: CPT

## 2019-01-03 RX ORDER — SODIUM CHLORIDE 0.9 % (FLUSH) 0.9 %
10 SYRINGE (ML) INJECTION PRN
Status: DISCONTINUED | OUTPATIENT
Start: 2019-01-03 | End: 2019-01-05 | Stop reason: HOSPADM

## 2019-01-03 RX ORDER — DOCUSATE SODIUM 100 MG/1
100 CAPSULE, LIQUID FILLED ORAL 2 TIMES DAILY
Status: DISCONTINUED | OUTPATIENT
Start: 2019-01-03 | End: 2019-01-05 | Stop reason: HOSPADM

## 2019-01-03 RX ORDER — SEVELAMER CARBONATE 800 MG/1
3200 TABLET, FILM COATED ORAL 3 TIMES DAILY
Status: DISCONTINUED | OUTPATIENT
Start: 2019-01-03 | End: 2019-01-05 | Stop reason: HOSPADM

## 2019-01-03 RX ORDER — AMIODARONE HYDROCHLORIDE 200 MG/1
100 TABLET ORAL DAILY
Status: DISCONTINUED | OUTPATIENT
Start: 2019-01-03 | End: 2019-01-05 | Stop reason: HOSPADM

## 2019-01-03 RX ORDER — ATORVASTATIN CALCIUM 40 MG/1
40 TABLET, FILM COATED ORAL NIGHTLY
Status: DISCONTINUED | OUTPATIENT
Start: 2019-01-03 | End: 2019-01-03 | Stop reason: SDUPTHER

## 2019-01-03 RX ORDER — SODIUM CHLORIDE 0.9 % (FLUSH) 0.9 %
10 SYRINGE (ML) INJECTION EVERY 12 HOURS SCHEDULED
Status: DISCONTINUED | OUTPATIENT
Start: 2019-01-03 | End: 2019-01-05 | Stop reason: HOSPADM

## 2019-01-03 RX ORDER — MORPHINE SULFATE 4 MG/ML
4 INJECTION, SOLUTION INTRAMUSCULAR; INTRAVENOUS ONCE
Status: COMPLETED | OUTPATIENT
Start: 2019-01-03 | End: 2019-01-03

## 2019-01-03 RX ORDER — DIPHENHYDRAMINE HCL 25 MG
25 TABLET ORAL EVERY 6 HOURS PRN
Status: DISCONTINUED | OUTPATIENT
Start: 2019-01-03 | End: 2019-01-05 | Stop reason: HOSPADM

## 2019-01-03 RX ORDER — NITROGLYCERIN 0.4 MG/1
0.4 TABLET SUBLINGUAL EVERY 5 MIN PRN
Status: DISCONTINUED | OUTPATIENT
Start: 2019-01-03 | End: 2019-01-05 | Stop reason: HOSPADM

## 2019-01-03 RX ORDER — TRAZODONE HYDROCHLORIDE 50 MG/1
150 TABLET ORAL NIGHTLY
Status: DISCONTINUED | OUTPATIENT
Start: 2019-01-03 | End: 2019-01-05 | Stop reason: HOSPADM

## 2019-01-03 RX ORDER — ASPIRIN 325 MG
325 TABLET ORAL ONCE
Status: COMPLETED | OUTPATIENT
Start: 2019-01-03 | End: 2019-01-03

## 2019-01-03 RX ORDER — ONDANSETRON 2 MG/ML
4 INJECTION INTRAMUSCULAR; INTRAVENOUS EVERY 6 HOURS PRN
Status: DISCONTINUED | OUTPATIENT
Start: 2019-01-03 | End: 2019-01-05 | Stop reason: HOSPADM

## 2019-01-03 RX ORDER — ASPIRIN 81 MG/1
81 TABLET, CHEWABLE ORAL DAILY
Status: DISCONTINUED | OUTPATIENT
Start: 2019-01-04 | End: 2019-01-03 | Stop reason: SDUPTHER

## 2019-01-03 RX ORDER — HEPARIN SODIUM 5000 [USP'U]/ML
5000 INJECTION, SOLUTION INTRAVENOUS; SUBCUTANEOUS EVERY 8 HOURS SCHEDULED
Status: DISCONTINUED | OUTPATIENT
Start: 2019-01-03 | End: 2019-01-05 | Stop reason: HOSPADM

## 2019-01-03 RX ORDER — CETIRIZINE HYDROCHLORIDE 10 MG/1
10 TABLET ORAL DAILY
Status: DISCONTINUED | OUTPATIENT
Start: 2019-01-03 | End: 2019-01-05 | Stop reason: HOSPADM

## 2019-01-03 RX ORDER — ASPIRIN 81 MG/1
81 TABLET ORAL DAILY
Status: DISCONTINUED | OUTPATIENT
Start: 2019-01-04 | End: 2019-01-05 | Stop reason: HOSPADM

## 2019-01-03 RX ORDER — IPRATROPIUM BROMIDE AND ALBUTEROL SULFATE 2.5; .5 MG/3ML; MG/3ML
3 SOLUTION RESPIRATORY (INHALATION) EVERY 6 HOURS PRN
Status: DISCONTINUED | OUTPATIENT
Start: 2019-01-03 | End: 2019-01-05 | Stop reason: HOSPADM

## 2019-01-03 RX ORDER — CLOPIDOGREL BISULFATE 75 MG/1
75 TABLET ORAL DAILY
Status: DISCONTINUED | OUTPATIENT
Start: 2019-01-03 | End: 2019-01-05 | Stop reason: HOSPADM

## 2019-01-03 RX ORDER — CITALOPRAM 20 MG/1
40 TABLET ORAL DAILY
Status: DISCONTINUED | OUTPATIENT
Start: 2019-01-03 | End: 2019-01-05 | Stop reason: HOSPADM

## 2019-01-03 RX ORDER — METOPROLOL TARTRATE 50 MG/1
50 TABLET, FILM COATED ORAL 2 TIMES DAILY
Status: DISCONTINUED | OUTPATIENT
Start: 2019-01-03 | End: 2019-01-05 | Stop reason: HOSPADM

## 2019-01-03 RX ORDER — ATORVASTATIN CALCIUM 40 MG/1
40 TABLET, FILM COATED ORAL NIGHTLY
Status: DISCONTINUED | OUTPATIENT
Start: 2019-01-03 | End: 2019-01-05 | Stop reason: HOSPADM

## 2019-01-03 RX ADMIN — ASPIRIN 325 MG: 325 TABLET ORAL at 11:46

## 2019-01-03 RX ADMIN — NITROGLYCERIN 0.5 INCH: 20 OINTMENT TOPICAL at 10:22

## 2019-01-03 RX ADMIN — CETIRIZINE HYDROCHLORIDE 10 MG: 10 TABLET, FILM COATED ORAL at 18:07

## 2019-01-03 RX ADMIN — DOCUSATE SODIUM 100 MG: 100 CAPSULE, LIQUID FILLED ORAL at 20:14

## 2019-01-03 RX ADMIN — DIPHENHYDRAMINE HCL 25 MG: 25 TABLET ORAL at 18:06

## 2019-01-03 RX ADMIN — ATORVASTATIN CALCIUM 40 MG: 40 TABLET, FILM COATED ORAL at 20:14

## 2019-01-03 RX ADMIN — HEPARIN SODIUM 5000 UNITS: 5000 INJECTION INTRAVENOUS; SUBCUTANEOUS at 20:14

## 2019-01-03 RX ADMIN — CITALOPRAM HYDROBROMIDE 40 MG: 20 TABLET ORAL at 18:07

## 2019-01-03 RX ADMIN — MORPHINE SULFATE 4 MG: 4 INJECTION, SOLUTION INTRAMUSCULAR; INTRAVENOUS at 10:59

## 2019-01-03 RX ADMIN — TRAZODONE HYDROCHLORIDE 150 MG: 50 TABLET ORAL at 20:14

## 2019-01-03 RX ADMIN — SODIUM CHLORIDE, PRESERVATIVE FREE 10 ML: 5 INJECTION INTRAVENOUS at 20:14

## 2019-01-03 RX ADMIN — METOPROLOL TARTRATE 50 MG: 50 TABLET ORAL at 20:14

## 2019-01-03 RX ADMIN — SEVELAMER CARBONATE 3200 MG: 800 TABLET, FILM COATED ORAL at 18:06

## 2019-01-03 RX ADMIN — CLOPIDOGREL BISULFATE 75 MG: 75 TABLET ORAL at 18:07

## 2019-01-03 ASSESSMENT — PAIN SCALES - GENERAL
PAINLEVEL_OUTOF10: 3
PAINLEVEL_OUTOF10: 3
PAINLEVEL_OUTOF10: 0
PAINLEVEL_OUTOF10: 3
PAINLEVEL_OUTOF10: 3

## 2019-01-03 ASSESSMENT — PAIN DESCRIPTION - DESCRIPTORS: DESCRIPTORS: SHARP;STABBING

## 2019-01-03 ASSESSMENT — PAIN DESCRIPTION - LOCATION
LOCATION: CHEST
LOCATION: CHEST

## 2019-01-03 ASSESSMENT — PAIN DESCRIPTION - FREQUENCY: FREQUENCY: CONTINUOUS

## 2019-01-03 ASSESSMENT — PAIN DESCRIPTION - PAIN TYPE: TYPE: ACUTE PAIN

## 2019-01-03 ASSESSMENT — PAIN DESCRIPTION - PROGRESSION: CLINICAL_PROGRESSION: GRADUALLY IMPROVING

## 2019-01-03 ASSESSMENT — PAIN DESCRIPTION - ORIENTATION: ORIENTATION: LEFT;MID

## 2019-01-04 ENCOUNTER — TELEPHONE (OUTPATIENT)
Dept: CARDIOLOGY CLINIC | Age: 59
End: 2019-01-04

## 2019-01-04 ENCOUNTER — APPOINTMENT (OUTPATIENT)
Dept: NUCLEAR MEDICINE | Age: 59
DRG: 313 | End: 2019-01-04
Payer: MEDICARE

## 2019-01-04 ENCOUNTER — APPOINTMENT (OUTPATIENT)
Dept: CARDIAC CATH/INVASIVE PROCEDURES | Age: 59
DRG: 313 | End: 2019-01-04
Payer: MEDICARE

## 2019-01-04 LAB
A/G RATIO: 1 (ref 1.1–2.2)
ALBUMIN SERPL-MCNC: 3.2 G/DL (ref 3.4–5)
ALP BLD-CCNC: 63 U/L (ref 40–129)
ALT SERPL-CCNC: 9 U/L (ref 10–40)
ANION GAP SERPL CALCULATED.3IONS-SCNC: 13 MMOL/L (ref 3–16)
APTT: 33.1 SEC (ref 26–36)
AST SERPL-CCNC: 10 U/L (ref 15–37)
BASOPHILS ABSOLUTE: 0.1 K/UL (ref 0–0.2)
BASOPHILS RELATIVE PERCENT: 1.4 %
BILIRUB SERPL-MCNC: <0.2 MG/DL (ref 0–1)
BUN BLDV-MCNC: 53 MG/DL (ref 7–20)
CALCIUM SERPL-MCNC: 9 MG/DL (ref 8.3–10.6)
CHLORIDE BLD-SCNC: 100 MMOL/L (ref 99–110)
CO2: 25 MMOL/L (ref 21–32)
CREAT SERPL-MCNC: 8.4 MG/DL (ref 0.8–1.3)
EOSINOPHILS ABSOLUTE: 0.7 K/UL (ref 0–0.6)
EOSINOPHILS RELATIVE PERCENT: 11.2 %
GFR AFRICAN AMERICAN: 8
GFR NON-AFRICAN AMERICAN: 7
GLOBULIN: 3.1 G/DL
GLUCOSE BLD-MCNC: 78 MG/DL (ref 70–99)
HCT VFR BLD CALC: 37.3 % (ref 40.5–52.5)
HEMOGLOBIN: 11.8 G/DL (ref 13.5–17.5)
LV EF: 62 %
LVEF MODALITY: NORMAL
LYMPHOCYTES ABSOLUTE: 1.1 K/UL (ref 1–5.1)
LYMPHOCYTES RELATIVE PERCENT: 16.5 %
MAGNESIUM: 2.3 MG/DL (ref 1.8–2.4)
MCH RBC QN AUTO: 30.9 PG (ref 26–34)
MCHC RBC AUTO-ENTMCNC: 31.7 G/DL (ref 31–36)
MCV RBC AUTO: 97.2 FL (ref 80–100)
MONOCYTES ABSOLUTE: 0.5 K/UL (ref 0–1.3)
MONOCYTES RELATIVE PERCENT: 8.5 %
NEUTROPHILS ABSOLUTE: 4 K/UL (ref 1.7–7.7)
NEUTROPHILS RELATIVE PERCENT: 62.4 %
PDW BLD-RTO: 16.3 % (ref 12.4–15.4)
PLATELET # BLD: 259 K/UL (ref 135–450)
PMV BLD AUTO: 6.9 FL (ref 5–10.5)
POTASSIUM REFLEX MAGNESIUM: 5.3 MMOL/L (ref 3.5–5.1)
RBC # BLD: 3.83 M/UL (ref 4.2–5.9)
SODIUM BLD-SCNC: 138 MMOL/L (ref 136–145)
TOTAL PROTEIN: 6.3 G/DL (ref 6.4–8.2)
TROPONIN: 0.03 NG/ML
WBC # BLD: 6.4 K/UL (ref 4–11)

## 2019-01-04 PROCEDURE — 80053 COMPREHEN METABOLIC PANEL: CPT

## 2019-01-04 PROCEDURE — 6370000000 HC RX 637 (ALT 250 FOR IP): Performed by: STUDENT IN AN ORGANIZED HEALTH CARE EDUCATION/TRAINING PROGRAM

## 2019-01-04 PROCEDURE — 90937 HEMODIALYSIS REPEATED EVAL: CPT

## 2019-01-04 PROCEDURE — 2580000003 HC RX 258: Performed by: NURSE PRACTITIONER

## 2019-01-04 PROCEDURE — 1200000000 HC SEMI PRIVATE

## 2019-01-04 PROCEDURE — 6360000002 HC RX W HCPCS: Performed by: INTERNAL MEDICINE

## 2019-01-04 PROCEDURE — 99233 SBSQ HOSP IP/OBS HIGH 50: CPT | Performed by: NURSE PRACTITIONER

## 2019-01-04 PROCEDURE — 36415 COLL VENOUS BLD VENIPUNCTURE: CPT

## 2019-01-04 PROCEDURE — 94761 N-INVAS EAR/PLS OXIMETRY MLT: CPT

## 2019-01-04 PROCEDURE — A9502 TC99M TETROFOSMIN: HCPCS | Performed by: INTERNAL MEDICINE

## 2019-01-04 PROCEDURE — 85025 COMPLETE CBC W/AUTO DIFF WBC: CPT

## 2019-01-04 PROCEDURE — 6360000002 HC RX W HCPCS: Performed by: NURSE PRACTITIONER

## 2019-01-04 PROCEDURE — 5A1D70Z PERFORMANCE OF URINARY FILTRATION, INTERMITTENT, LESS THAN 6 HOURS PER DAY: ICD-10-PCS | Performed by: INTERNAL MEDICINE

## 2019-01-04 PROCEDURE — 78452 HT MUSCLE IMAGE SPECT MULT: CPT

## 2019-01-04 PROCEDURE — 85730 THROMBOPLASTIN TIME PARTIAL: CPT

## 2019-01-04 PROCEDURE — 2580000003 HC RX 258: Performed by: STUDENT IN AN ORGANIZED HEALTH CARE EDUCATION/TRAINING PROGRAM

## 2019-01-04 PROCEDURE — 3430000000 HC RX DIAGNOSTIC RADIOPHARMACEUTICAL: Performed by: INTERNAL MEDICINE

## 2019-01-04 PROCEDURE — 83735 ASSAY OF MAGNESIUM: CPT

## 2019-01-04 PROCEDURE — 93017 CV STRESS TEST TRACING ONLY: CPT

## 2019-01-04 PROCEDURE — 6370000000 HC RX 637 (ALT 250 FOR IP): Performed by: INTERNAL MEDICINE

## 2019-01-04 RX ADMIN — TRAZODONE HYDROCHLORIDE 150 MG: 50 TABLET ORAL at 20:10

## 2019-01-04 RX ADMIN — SEVELAMER CARBONATE 3200 MG: 800 TABLET, FILM COATED ORAL at 20:09

## 2019-01-04 RX ADMIN — SODIUM CHLORIDE, PRESERVATIVE FREE 10 ML: 5 INJECTION INTRAVENOUS at 12:23

## 2019-01-04 RX ADMIN — Medication 10 ML: at 12:25

## 2019-01-04 RX ADMIN — Medication 10 ML: at 20:30

## 2019-01-04 RX ADMIN — CETIRIZINE HYDROCHLORIDE 10 MG: 10 TABLET, FILM COATED ORAL at 12:25

## 2019-01-04 RX ADMIN — SEVELAMER CARBONATE 3200 MG: 800 TABLET, FILM COATED ORAL at 12:24

## 2019-01-04 RX ADMIN — CLOPIDOGREL BISULFATE 75 MG: 75 TABLET ORAL at 12:24

## 2019-01-04 RX ADMIN — CITALOPRAM HYDROBROMIDE 40 MG: 20 TABLET ORAL at 12:24

## 2019-01-04 RX ADMIN — TETROFOSMIN 34.8 MILLICURIE: 0.23 INJECTION, POWDER, LYOPHILIZED, FOR SOLUTION INTRAVENOUS at 09:42

## 2019-01-04 RX ADMIN — DIPHENHYDRAMINE HCL 25 MG: 25 TABLET ORAL at 12:30

## 2019-01-04 RX ADMIN — METOPROLOL TARTRATE 50 MG: 50 TABLET ORAL at 12:25

## 2019-01-04 RX ADMIN — SODIUM CHLORIDE, PRESERVATIVE FREE 10 ML: 5 INJECTION INTRAVENOUS at 20:10

## 2019-01-04 RX ADMIN — ATORVASTATIN CALCIUM 40 MG: 40 TABLET, FILM COATED ORAL at 20:10

## 2019-01-04 RX ADMIN — HEPARIN SODIUM 5000 UNITS: 5000 INJECTION INTRAVENOUS; SUBCUTANEOUS at 05:50

## 2019-01-04 RX ADMIN — DOCUSATE SODIUM 100 MG: 100 CAPSULE, LIQUID FILLED ORAL at 20:10

## 2019-01-04 RX ADMIN — TETROFOSMIN 10.5 MILLICURIE: 0.23 INJECTION, POWDER, LYOPHILIZED, FOR SOLUTION INTRAVENOUS at 08:29

## 2019-01-04 RX ADMIN — HEPARIN SODIUM 5000 UNITS: 5000 INJECTION INTRAVENOUS; SUBCUTANEOUS at 20:13

## 2019-01-04 RX ADMIN — AMIODARONE HYDROCHLORIDE 100 MG: 200 TABLET ORAL at 12:24

## 2019-01-04 RX ADMIN — SEVELAMER CARBONATE 3200 MG: 800 TABLET, FILM COATED ORAL at 14:00

## 2019-01-04 RX ADMIN — HEPARIN SODIUM 5000 UNITS: 5000 INJECTION INTRAVENOUS; SUBCUTANEOUS at 14:29

## 2019-01-04 RX ADMIN — ASPIRIN 81 MG: 81 TABLET, COATED ORAL at 12:24

## 2019-01-04 RX ADMIN — REGADENOSON 0.4 MG: 0.08 INJECTION, SOLUTION INTRAVENOUS at 09:42

## 2019-01-04 RX ADMIN — DOCUSATE SODIUM 100 MG: 100 CAPSULE, LIQUID FILLED ORAL at 12:25

## 2019-01-04 ASSESSMENT — ENCOUNTER SYMPTOMS
GASTROINTESTINAL NEGATIVE: 1
RESPIRATORY NEGATIVE: 1

## 2019-01-05 VITALS
HEART RATE: 70 BPM | SYSTOLIC BLOOD PRESSURE: 138 MMHG | BODY MASS INDEX: 30.94 KG/M2 | WEIGHT: 215.61 LBS | RESPIRATION RATE: 16 BRPM | OXYGEN SATURATION: 96 % | DIASTOLIC BLOOD PRESSURE: 68 MMHG | TEMPERATURE: 98 F

## 2019-01-05 LAB
ALBUMIN SERPL-MCNC: 3.1 G/DL (ref 3.4–5)
ANION GAP SERPL CALCULATED.3IONS-SCNC: 13 MMOL/L (ref 3–16)
APTT: 34.2 SEC (ref 26–36)
BASOPHILS ABSOLUTE: 0.1 K/UL (ref 0–0.2)
BASOPHILS RELATIVE PERCENT: 2.1 %
BUN BLDV-MCNC: 37 MG/DL (ref 7–20)
CALCIUM SERPL-MCNC: 8.8 MG/DL (ref 8.3–10.6)
CHLORIDE BLD-SCNC: 93 MMOL/L (ref 99–110)
CO2: 27 MMOL/L (ref 21–32)
CREAT SERPL-MCNC: 6.4 MG/DL (ref 0.8–1.3)
EOSINOPHILS ABSOLUTE: 0.6 K/UL (ref 0–0.6)
EOSINOPHILS RELATIVE PERCENT: 8.9 %
GFR AFRICAN AMERICAN: 11
GFR NON-AFRICAN AMERICAN: 9
GLUCOSE BLD-MCNC: 94 MG/DL (ref 70–99)
HAV IGM SER IA-ACNC: NORMAL
HBV SURFACE AB TITR SER: 5.98 MIU/ML
HCT VFR BLD CALC: 37.5 % (ref 40.5–52.5)
HEMOGLOBIN: 12.1 G/DL (ref 13.5–17.5)
HEPATITIS B CORE IGM ANTIBODY: NORMAL
HEPATITIS B SURFACE ANTIGEN INTERPRETATION: NORMAL
HEPATITIS C ANTIBODY INTERPRETATION: NORMAL
LYMPHOCYTES ABSOLUTE: 0.9 K/UL (ref 1–5.1)
LYMPHOCYTES RELATIVE PERCENT: 13.8 %
MCH RBC QN AUTO: 31.3 PG (ref 26–34)
MCHC RBC AUTO-ENTMCNC: 32.1 G/DL (ref 31–36)
MCV RBC AUTO: 97.5 FL (ref 80–100)
MONOCYTES ABSOLUTE: 0.6 K/UL (ref 0–1.3)
MONOCYTES RELATIVE PERCENT: 9 %
NEUTROPHILS ABSOLUTE: 4.3 K/UL (ref 1.7–7.7)
NEUTROPHILS RELATIVE PERCENT: 66.2 %
PDW BLD-RTO: 16.1 % (ref 12.4–15.4)
PHOSPHORUS: 5.6 MG/DL (ref 2.5–4.9)
PLATELET # BLD: 241 K/UL (ref 135–450)
PMV BLD AUTO: 7.1 FL (ref 5–10.5)
POTASSIUM SERPL-SCNC: 5 MMOL/L (ref 3.5–5.1)
RBC # BLD: 3.85 M/UL (ref 4.2–5.9)
SODIUM BLD-SCNC: 133 MMOL/L (ref 136–145)
WBC # BLD: 6.4 K/UL (ref 4–11)

## 2019-01-05 PROCEDURE — 2700000000 HC OXYGEN THERAPY PER DAY

## 2019-01-05 PROCEDURE — 94761 N-INVAS EAR/PLS OXIMETRY MLT: CPT

## 2019-01-05 PROCEDURE — 2580000003 HC RX 258: Performed by: STUDENT IN AN ORGANIZED HEALTH CARE EDUCATION/TRAINING PROGRAM

## 2019-01-05 PROCEDURE — 85730 THROMBOPLASTIN TIME PARTIAL: CPT

## 2019-01-05 PROCEDURE — 6370000000 HC RX 637 (ALT 250 FOR IP): Performed by: STUDENT IN AN ORGANIZED HEALTH CARE EDUCATION/TRAINING PROGRAM

## 2019-01-05 PROCEDURE — 80069 RENAL FUNCTION PANEL: CPT

## 2019-01-05 PROCEDURE — 85025 COMPLETE CBC W/AUTO DIFF WBC: CPT

## 2019-01-05 PROCEDURE — 36415 COLL VENOUS BLD VENIPUNCTURE: CPT

## 2019-01-05 PROCEDURE — 90937 HEMODIALYSIS REPEATED EVAL: CPT

## 2019-01-05 RX ORDER — HEPARIN SODIUM 1000 [USP'U]/ML
4400 INJECTION, SOLUTION INTRAVENOUS; SUBCUTANEOUS PRN
Status: DISCONTINUED | OUTPATIENT
Start: 2019-01-05 | End: 2019-01-05 | Stop reason: HOSPADM

## 2019-01-05 RX ORDER — ATORVASTATIN CALCIUM 40 MG/1
40 TABLET, FILM COATED ORAL DAILY
Qty: 30 TABLET | Refills: 1 | Status: SHIPPED | OUTPATIENT
Start: 2019-01-05

## 2019-01-05 RX ORDER — ATORVASTATIN CALCIUM 40 MG/1
40 TABLET, FILM COATED ORAL DAILY
Qty: 30 TABLET | Refills: 1 | Status: SHIPPED | OUTPATIENT
Start: 2019-01-05 | End: 2019-01-05

## 2019-01-05 RX ADMIN — DOCUSATE SODIUM 100 MG: 100 CAPSULE, LIQUID FILLED ORAL at 08:15

## 2019-01-05 RX ADMIN — DIPHENHYDRAMINE HCL 25 MG: 25 TABLET ORAL at 08:59

## 2019-01-05 RX ADMIN — CETIRIZINE HYDROCHLORIDE 10 MG: 10 TABLET, FILM COATED ORAL at 08:16

## 2019-01-05 RX ADMIN — SEVELAMER CARBONATE 3200 MG: 800 TABLET, FILM COATED ORAL at 08:15

## 2019-01-05 RX ADMIN — AMIODARONE HYDROCHLORIDE 100 MG: 200 TABLET ORAL at 08:15

## 2019-01-05 RX ADMIN — ASPIRIN 81 MG: 81 TABLET, COATED ORAL at 08:15

## 2019-01-05 RX ADMIN — CLOPIDOGREL BISULFATE 75 MG: 75 TABLET ORAL at 08:15

## 2019-01-05 RX ADMIN — CITALOPRAM HYDROBROMIDE 40 MG: 20 TABLET ORAL at 08:16

## 2019-01-05 RX ADMIN — SODIUM CHLORIDE, PRESERVATIVE FREE 10 ML: 5 INJECTION INTRAVENOUS at 08:18

## 2019-01-05 RX ADMIN — SEVELAMER CARBONATE 3200 MG: 800 TABLET, FILM COATED ORAL at 16:09

## 2019-01-05 ASSESSMENT — PAIN DESCRIPTION - LOCATION: LOCATION: CHEST

## 2019-01-05 ASSESSMENT — PAIN SCALES - GENERAL
PAINLEVEL_OUTOF10: 0
PAINLEVEL_OUTOF10: 0
PAINLEVEL_OUTOF10: 4
PAINLEVEL_OUTOF10: 0

## 2019-01-06 ENCOUNTER — ANESTHESIA EVENT (OUTPATIENT)
Dept: CARDIAC CATH/INVASIVE PROCEDURES | Age: 59
End: 2019-01-06
Payer: MEDICARE

## 2019-01-07 ENCOUNTER — ANESTHESIA (OUTPATIENT)
Dept: CARDIAC CATH/INVASIVE PROCEDURES | Age: 59
End: 2019-01-07
Payer: MEDICARE

## 2019-01-07 ENCOUNTER — HOSPITAL ENCOUNTER (OUTPATIENT)
Dept: CARDIAC CATH/INVASIVE PROCEDURES | Age: 59
Discharge: HOME | End: 2019-01-07
Attending: INTERNAL MEDICINE | Admitting: INTERNAL MEDICINE
Payer: MEDICARE

## 2019-01-07 VITALS — SYSTOLIC BLOOD PRESSURE: 181 MMHG | DIASTOLIC BLOOD PRESSURE: 77 MMHG | OXYGEN SATURATION: 93 %

## 2019-01-07 LAB
LV EF: 55 %
LVEF MODALITY: NORMAL

## 2019-01-07 PROCEDURE — 93320 DOPPLER ECHO COMPLETE: CPT

## 2019-01-07 PROCEDURE — 93312 ECHO TRANSESOPHAGEAL: CPT

## 2019-01-07 PROCEDURE — 2580000003 HC RX 258

## 2019-01-07 PROCEDURE — 3700000000 HC ANESTHESIA ATTENDED CARE

## 2019-01-07 PROCEDURE — 93325 DOPPLER ECHO COLOR FLOW MAPG: CPT

## 2019-01-07 PROCEDURE — 2500000003 HC RX 250 WO HCPCS: Performed by: NURSE ANESTHETIST, CERTIFIED REGISTERED

## 2019-01-07 PROCEDURE — 2580000003 HC RX 258: Performed by: NURSE ANESTHETIST, CERTIFIED REGISTERED

## 2019-01-07 PROCEDURE — 92960 CARDIOVERSION ELECTRIC EXT: CPT

## 2019-01-07 PROCEDURE — 93312 ECHO TRANSESOPHAGEAL: CPT | Performed by: INTERNAL MEDICINE

## 2019-01-07 PROCEDURE — 6360000002 HC RX W HCPCS: Performed by: NURSE ANESTHETIST, CERTIFIED REGISTERED

## 2019-01-07 PROCEDURE — 3700000001 HC ADD 15 MINUTES (ANESTHESIA)

## 2019-01-07 RX ORDER — PROPOFOL 10 MG/ML
INJECTION, EMULSION INTRAVENOUS PRN
Status: DISCONTINUED | OUTPATIENT
Start: 2019-01-07 | End: 2019-01-07 | Stop reason: SDUPTHER

## 2019-01-07 RX ORDER — SODIUM CHLORIDE 9 MG/ML
INJECTION, SOLUTION INTRAVENOUS CONTINUOUS PRN
Status: DISCONTINUED | OUTPATIENT
Start: 2019-01-07 | End: 2019-01-07 | Stop reason: SDUPTHER

## 2019-01-07 RX ORDER — LIDOCAINE HYDROCHLORIDE 20 MG/ML
INJECTION, SOLUTION INFILTRATION; PERINEURAL PRN
Status: DISCONTINUED | OUTPATIENT
Start: 2019-01-07 | End: 2019-01-07 | Stop reason: SDUPTHER

## 2019-01-07 RX ORDER — ASPIRIN 81 MG/1
162 TABLET ORAL DAILY
Qty: 90 TABLET | Refills: 3 | Status: SHIPPED | OUTPATIENT
Start: 2019-01-07

## 2019-01-07 RX ADMIN — PROPOFOL 50 MG: 10 INJECTION, EMULSION INTRAVENOUS at 11:27

## 2019-01-07 RX ADMIN — PROPOFOL 50 MG: 10 INJECTION, EMULSION INTRAVENOUS at 11:26

## 2019-01-07 RX ADMIN — PROPOFOL 50 MG: 10 INJECTION, EMULSION INTRAVENOUS at 11:33

## 2019-01-07 RX ADMIN — LIDOCAINE HYDROCHLORIDE 40 MG: 20 INJECTION, SOLUTION INFILTRATION; PERINEURAL at 11:26

## 2019-01-07 RX ADMIN — SODIUM CHLORIDE: 9 INJECTION, SOLUTION INTRAVENOUS at 11:14

## 2019-01-07 ASSESSMENT — COPD QUESTIONNAIRES: CAT_SEVERITY: SEVERE

## 2019-03-12 ENCOUNTER — HOSPITAL ENCOUNTER (EMERGENCY)
Age: 59
Discharge: HOME | End: 2019-03-12
Attending: EMERGENCY MEDICINE
Payer: MEDICARE

## 2019-03-12 ENCOUNTER — APPOINTMENT (OUTPATIENT)
Dept: GENERAL RADIOLOGY | Age: 59
End: 2019-03-12
Payer: MEDICARE

## 2019-03-12 ENCOUNTER — APPOINTMENT (OUTPATIENT)
Dept: CT IMAGING | Age: 59
End: 2019-03-12
Payer: MEDICARE

## 2019-03-12 VITALS
HEART RATE: 63 BPM | OXYGEN SATURATION: 94 % | TEMPERATURE: 97.9 F | SYSTOLIC BLOOD PRESSURE: 102 MMHG | DIASTOLIC BLOOD PRESSURE: 53 MMHG | RESPIRATION RATE: 14 BRPM
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
## 2019-03-12 DIAGNOSIS — S42.464A CLOSED NONDISPLACED FRACTURE OF MEDIAL CONDYLE OF RIGHT HUMERUS, INITIAL ENCOUNTER: Primary | ICD-10-CM

## 2019-03-12 DIAGNOSIS — S09.90XA CLOSED HEAD INJURY, INITIAL ENCOUNTER: ICD-10-CM

## 2019-03-12 PROCEDURE — 73080 X-RAY EXAM OF ELBOW: CPT

## 2019-03-12 PROCEDURE — 99284 EMERGENCY DEPT VISIT MOD MDM: CPT

## 2019-03-12 PROCEDURE — 70450 CT HEAD/BRAIN W/O DYE: CPT

## 2019-03-12 RX ORDER — HYDROCODONE BITARTRATE AND ACETAMINOPHEN 5; 325 MG/1; MG/1
1 TABLET ORAL EVERY 6 HOURS PRN
Qty: 11 TABLET | Refills: 0 | Status: SHIPPED | OUTPATIENT
Start: 2019-03-12 | End: 2019-03-19

## 2019-03-12 ASSESSMENT — PAIN DESCRIPTION - DESCRIPTORS: DESCRIPTORS: ACHING

## 2019-03-12 ASSESSMENT — PAIN DESCRIPTION - LOCATION: LOCATION: ELBOW

## 2019-03-12 ASSESSMENT — PAIN DESCRIPTION - PAIN TYPE: TYPE: ACUTE PAIN

## 2019-03-12 ASSESSMENT — PAIN SCALES - GENERAL
PAINLEVEL_OUTOF10: 9
PAINLEVEL_OUTOF10: 3

## 2019-03-12 ASSESSMENT — PAIN DESCRIPTION - ONSET: ONSET: SUDDEN

## 2019-03-12 ASSESSMENT — PAIN DESCRIPTION - ORIENTATION: ORIENTATION: RIGHT

## 2019-03-12 ASSESSMENT — PAIN DESCRIPTION - PROGRESSION: CLINICAL_PROGRESSION: GRADUALLY WORSENING

## 2019-03-12 ASSESSMENT — PAIN DESCRIPTION - FREQUENCY: FREQUENCY: CONTINUOUS

## 2019-03-15 ENCOUNTER — OFFICE VISIT (OUTPATIENT)
Dept: ORTHOPEDIC SURGERY | Age: 59
End: 2019-03-15
Payer: MEDICARE

## 2019-03-15 ENCOUNTER — HOSPITAL ENCOUNTER (OUTPATIENT)
Dept: OCCUPATIONAL THERAPY | Age: 59
Setting detail: THERAPIES SERIES
Discharge: HOME | End: 2019-03-15
Payer: MEDICARE

## 2019-03-15 VITALS — BODY MASS INDEX: 30.87 KG/M2 | WEIGHT: 215.61 LBS | HEIGHT: 70 IN

## 2019-03-15 DIAGNOSIS — M25.521 RIGHT ELBOW PAIN: Primary | ICD-10-CM

## 2019-03-15 DIAGNOSIS — S42.444A NONDISPLACED FRACTURE (AVULSION) OF MEDIAL EPICONDYLE OF RIGHT HUMERUS, INITIAL ENCOUNTER FOR CLOSED FRACTURE: ICD-10-CM

## 2019-03-15 PROCEDURE — G8417 CALC BMI ABV UP PARAM F/U: HCPCS | Performed by: ORTHOPAEDIC SURGERY

## 2019-03-15 PROCEDURE — 99203 OFFICE O/P NEW LOW 30 MIN: CPT | Performed by: ORTHOPAEDIC SURGERY

## 2019-03-15 PROCEDURE — 24576 CLTX HUMRL CNDYLR FX WO MNPJ: CPT | Performed by: ORTHOPAEDIC SURGERY

## 2019-03-15 PROCEDURE — 1036F TOBACCO NON-USER: CPT | Performed by: ORTHOPAEDIC SURGERY

## 2019-03-15 PROCEDURE — G8482 FLU IMMUNIZE ORDER/ADMIN: HCPCS | Performed by: ORTHOPAEDIC SURGERY

## 2019-03-15 PROCEDURE — L3702 EO W/O JOINTS CF: HCPCS | Performed by: OCCUPATIONAL THERAPIST

## 2019-03-15 PROCEDURE — 3017F COLORECTAL CA SCREEN DOC REV: CPT | Performed by: ORTHOPAEDIC SURGERY

## 2019-03-15 PROCEDURE — G8427 DOCREV CUR MEDS BY ELIG CLIN: HCPCS | Performed by: ORTHOPAEDIC SURGERY

## 2019-03-15 PROCEDURE — G8598 ASA/ANTIPLAT THER USED: HCPCS | Performed by: ORTHOPAEDIC SURGERY

## 2019-04-26 ENCOUNTER — OFFICE VISIT (OUTPATIENT)
Dept: ORTHOPEDIC SURGERY | Age: 59
End: 2019-04-26
Payer: MEDICARE

## 2019-04-26 VITALS — HEIGHT: 70 IN | BODY MASS INDEX: 30.87 KG/M2 | WEIGHT: 215.61 LBS

## 2019-04-26 DIAGNOSIS — S42.444A NONDISPLACED FRACTURE (AVULSION) OF MEDIAL EPICONDYLE OF RIGHT HUMERUS, INITIAL ENCOUNTER FOR CLOSED FRACTURE: ICD-10-CM

## 2019-04-26 DIAGNOSIS — M25.521 ELBOW PAIN, RIGHT: Primary | ICD-10-CM

## 2019-04-26 PROCEDURE — E0191 PROTECTOR HEEL OR ELBOW: HCPCS | Performed by: ORTHOPAEDIC SURGERY

## 2019-04-26 PROCEDURE — 99024 POSTOP FOLLOW-UP VISIT: CPT | Performed by: ORTHOPAEDIC SURGERY

## 2019-04-26 RX ORDER — AMIODARONE HYDROCHLORIDE 100 MG/1
100 TABLET ORAL DAILY
Qty: 30 TABLET | Refills: 3 | Status: SHIPPED | OUTPATIENT
Start: 2019-04-26 | End: 2019-09-23 | Stop reason: SDUPTHER

## 2019-04-26 NOTE — PROGRESS NOTES
Chief Complaint   Patient presents with    Follow-up     Right elbow pain, medial epicondyle fracture DOI 3/12/2019       HISTORY OF PRESENT ILLNESS:  Erica Gerard is a 62 y.o.  patient here for repeat x-ray evaluation after sustaining a right elbow medial condyle fracture 6 weeks ago, treated nonoperatively. He has a dialysis access point in his right arm. His elbow pain is much better but not gone. He has recently stopped the brace, states that it was uncomfortable. ROS:  ROS neg     Past medical history is reviewed again today, no changes to report    PHYSICAL EXAMINATION:  Patient is alert and pleasant, in no acute distress. The affected extremity is examined today. Right arm reveals good alignment with resolution of swelling, no ecchymosis or deformity. No breakdown of the skin about the elbow from the brace. Minimal discomfort with palpation of the elbow, no instability. Soft. Intact neuro vascular exam right hand. Unremarkable dialysis access right forearm    X-rays:  2 views of the right elbow are obtained and reviewed, impression: Distal humerus fracture noted without displacement. IMPRESSION AND PLAN: Right distal humerus fracture  Doing well after sustaining a right elbow fracture treated nonoperatively. X-rays reveal maintained alignment, patient is doing well clinically also. We will continue with our current care plan. Transitioned to a padded elbow sleeve with activities. Range of motion is permitted. No heavy impact activities for 6 more weeks. Follow-up in 2 months with repeat x-rays right elbow. Follow-up sooner if needed. Procedures    Elbow Protector B&C (Brace)     Patient was prescribed a [employer] Elbow Protector. The right elbow will require protection from this device to improve their function. The orthosis will assist in protecting the affected area, provide functional support and facilitate healing.     The patient was educated and fit by a healthcare professional with expert knowledge and specialization in brace application while under the direct supervision of the treating physician. Verbal and written instructions for the use of and application of this item were provided. They were instructed to contact the office immediately should the brace result in increased pain, decreased sensation, increased swelling or worsening of the condition. All questions and concerns were addressed today. Patient is in agreement with the plan. Ciara Ford MD  Hand & Upper Extremity Surgery  [address]  A partner of [address]        Please note that this transcription was created using voice recognition software. Any errors are unintentional and may be due to voice recognition transcription.

## 2019-06-19 ENCOUNTER — OFFICE VISIT (OUTPATIENT)
Dept: ORTHOPEDIC SURGERY | Age: 59
End: 2019-06-19
Payer: COMMERCIAL

## 2019-06-19 VITALS — BODY MASS INDEX: 30.87 KG/M2 | HEIGHT: 70 IN | WEIGHT: 215.61 LBS

## 2019-06-19 DIAGNOSIS — S42.444A NONDISPLACED FRACTURE (AVULSION) OF MEDIAL EPICONDYLE OF RIGHT HUMERUS, INITIAL ENCOUNTER FOR CLOSED FRACTURE: Primary | ICD-10-CM

## 2019-06-19 PROCEDURE — 1036F TOBACCO NON-USER: CPT | Performed by: ORTHOPAEDIC SURGERY

## 2019-06-19 PROCEDURE — G8428 CUR MEDS NOT DOCUMENT: HCPCS | Performed by: ORTHOPAEDIC SURGERY

## 2019-06-19 PROCEDURE — 3017F COLORECTAL CA SCREEN DOC REV: CPT | Performed by: ORTHOPAEDIC SURGERY

## 2019-06-19 PROCEDURE — 99213 OFFICE O/P EST LOW 20 MIN: CPT | Performed by: ORTHOPAEDIC SURGERY

## 2019-06-19 PROCEDURE — G8417 CALC BMI ABV UP PARAM F/U: HCPCS | Performed by: ORTHOPAEDIC SURGERY

## 2019-06-19 PROCEDURE — G8598 ASA/ANTIPLAT THER USED: HCPCS | Performed by: ORTHOPAEDIC SURGERY

## 2019-06-19 NOTE — PROGRESS NOTES
Chief Complaint   Patient presents with    Follow-up     Right Elbow       HISTORY OF PRESENT ILLNESS:  Ada Gonzalez is a 62 y.o.  patient here for repeat x-ray evaluation after sustaining a right elbow fracture 3 months ago and treated nonoperatively. Routine follow-up today. Patient denies pain in the elbow. He stopped wearing his brace approximately 2 weeks ago. ROS:  ROS neg     Past medical history is reviewed again today, no changes to report    PHYSICAL EXAMINATION:  Patient is alert and pleasant, in no acute distress. The affected extremity is examined today. Right elbow reveals normal alignment. No pain over the medial epicondyles palpation. Good motion without instability of the fracture. No instability of the joint or the ulnar nerve. Intact neurovascular exam right hand. Dialysis site appears unremarkable in the right forearm today    X-rays:  3 views, right elbow, impression:  Well aligned medial epicondyle fracture. Well aligned elbow      IMPRESSION AND PLAN: Right elbow medial epicondyle fracture  Doing well after sustaining a right elbow fracture treated nonsurgically. X-rays reveal maintained alignment, patient is doing well clinically also. We will continue with our current care plan. Continue with activities as tolerated. He is out of immobilization already. He will follow-up as symptoms dictate. All questions and concerns were addressed today. Patient is in agreement with the plan. Giovana Moreno MD  Hand & Upper Extremity Surgery  [address]  A partner of [facility] ([facility])        Please note that this transcription was created using voice recognition software. Any errors are unintentional and may be due to voice recognition transcription.

## 2019-07-31 ENCOUNTER — HOSPITAL ENCOUNTER (EMERGENCY)
Age: 59
Discharge: HOME | End: 2019-08-01
Attending: EMERGENCY MEDICINE
Payer: COMMERCIAL

## 2019-07-31 ENCOUNTER — APPOINTMENT (OUTPATIENT)
Dept: GENERAL RADIOLOGY | Age: 59
End: 2019-07-31
Payer: COMMERCIAL

## 2019-07-31 DIAGNOSIS — J40 BRONCHITIS: ICD-10-CM

## 2019-07-31 DIAGNOSIS — R05.9 COUGH: Primary | ICD-10-CM

## 2019-07-31 DIAGNOSIS — J81.1 CHRONIC PULMONARY EDEMA: ICD-10-CM

## 2019-07-31 LAB
A/G RATIO: 0.8 (ref 1.1–2.2)
ALBUMIN SERPL-MCNC: 3.6 G/DL (ref 3.4–5)
ALP BLD-CCNC: 94 U/L (ref 40–129)
ALT SERPL-CCNC: 10 U/L (ref 10–40)
ANION GAP SERPL CALCULATED.3IONS-SCNC: 16 MMOL/L (ref 3–16)
AST SERPL-CCNC: 23 U/L (ref 15–37)
BASOPHILS ABSOLUTE: 0.1 K/UL (ref 0–0.2)
BASOPHILS RELATIVE PERCENT: 0.8 %
BILIRUB SERPL-MCNC: <0.2 MG/DL (ref 0–1)
BUN BLDV-MCNC: 33 MG/DL (ref 7–20)
CALCIUM SERPL-MCNC: 8.9 MG/DL (ref 8.3–10.6)
CHLORIDE BLD-SCNC: 96 MMOL/L (ref 99–110)
CO2: 24 MMOL/L (ref 21–32)
CREAT SERPL-MCNC: 7.8 MG/DL (ref 0.8–1.3)
EOSINOPHILS ABSOLUTE: 1.1 K/UL (ref 0–0.6)
EOSINOPHILS RELATIVE PERCENT: 16.1 %
GFR AFRICAN AMERICAN: 9
GFR NON-AFRICAN AMERICAN: 7
GLOBULIN: 4.3 G/DL
GLUCOSE BLD-MCNC: 138 MG/DL (ref 70–99)
HCT VFR BLD CALC: 36.6 % (ref 40.5–52.5)
HEMOGLOBIN: 12 G/DL (ref 13.5–17.5)
LYMPHOCYTES ABSOLUTE: 0.7 K/UL (ref 1–5.1)
LYMPHOCYTES RELATIVE PERCENT: 10.1 %
MCH RBC QN AUTO: 31.3 PG (ref 26–34)
MCHC RBC AUTO-ENTMCNC: 32.7 G/DL (ref 31–36)
MCV RBC AUTO: 95.8 FL (ref 80–100)
MONOCYTES ABSOLUTE: 0.7 K/UL (ref 0–1.3)
MONOCYTES RELATIVE PERCENT: 10.3 %
NEUTROPHILS ABSOLUTE: 4.2 K/UL (ref 1.7–7.7)
NEUTROPHILS RELATIVE PERCENT: 62.7 %
PDW BLD-RTO: 15.3 % (ref 12.4–15.4)
PLATELET # BLD: 205 K/UL (ref 135–450)
PMV BLD AUTO: 7.5 FL (ref 5–10.5)
POTASSIUM REFLEX MAGNESIUM: 4.1 MMOL/L (ref 3.5–5.1)
RBC # BLD: 3.82 M/UL (ref 4.2–5.9)
SODIUM BLD-SCNC: 136 MMOL/L (ref 136–145)
TOTAL PROTEIN: 7.9 G/DL (ref 6.4–8.2)
WBC # BLD: 6.7 K/UL (ref 4–11)

## 2019-07-31 PROCEDURE — 80053 COMPREHEN METABOLIC PANEL: CPT

## 2019-07-31 PROCEDURE — 85025 COMPLETE CBC W/AUTO DIFF WBC: CPT

## 2019-07-31 PROCEDURE — 93005 ELECTROCARDIOGRAM TRACING: CPT | Performed by: EMERGENCY MEDICINE

## 2019-07-31 PROCEDURE — 99285 EMERGENCY DEPT VISIT HI MDM: CPT

## 2019-07-31 PROCEDURE — 71045 X-RAY EXAM CHEST 1 VIEW: CPT

## 2019-07-31 PROCEDURE — 6370000000 HC RX 637 (ALT 250 FOR IP): Performed by: EMERGENCY MEDICINE

## 2019-07-31 RX ORDER — BENZONATATE 100 MG/1
100 CAPSULE ORAL 3 TIMES DAILY PRN
Qty: 30 CAPSULE | Refills: 0 | Status: SHIPPED | OUTPATIENT
Start: 2019-07-31 | End: 2019-08-07

## 2019-07-31 RX ORDER — GUAIFENESIN/DEXTROMETHORPHAN 100-10MG/5
5 SYRUP ORAL 4 TIMES DAILY PRN
Qty: 240 ML | Refills: 0 | Status: ON HOLD | OUTPATIENT
Start: 2019-07-31 | End: 2019-08-13

## 2019-07-31 RX ORDER — GUAIFENESIN/DEXTROMETHORPHAN 100-10MG/5
5 SYRUP ORAL ONCE
Status: COMPLETED | OUTPATIENT
Start: 2019-07-31 | End: 2019-07-31

## 2019-07-31 RX ORDER — IPRATROPIUM BROMIDE AND ALBUTEROL SULFATE 2.5; .5 MG/3ML; MG/3ML
1 SOLUTION RESPIRATORY (INHALATION) ONCE
Status: COMPLETED | OUTPATIENT
Start: 2019-07-31 | End: 2019-07-31

## 2019-07-31 RX ORDER — BENZONATATE 100 MG/1
100 CAPSULE ORAL ONCE
Status: COMPLETED | OUTPATIENT
Start: 2019-07-31 | End: 2019-07-31

## 2019-07-31 RX ADMIN — BENZONATATE 100 MG: 100 CAPSULE ORAL at 23:15

## 2019-07-31 RX ADMIN — IPRATROPIUM BROMIDE AND ALBUTEROL SULFATE 1 AMPULE: .5; 3 SOLUTION RESPIRATORY (INHALATION) at 23:15

## 2019-07-31 RX ADMIN — GUAIFENESIN AND DEXTROMETHORPHAN 5 ML: 100; 10 SYRUP ORAL at 23:15

## 2019-08-01 VITALS
OXYGEN SATURATION: 95 % | HEIGHT: 70 IN | RESPIRATION RATE: 20 BRPM | SYSTOLIC BLOOD PRESSURE: 130 MMHG | TEMPERATURE: 98 F | WEIGHT: 221 LBS | BODY MASS INDEX: 31.64 KG/M2 | DIASTOLIC BLOOD PRESSURE: 67 MMHG | HEART RATE: 75 BPM

## 2019-08-01 LAB
EKG ATRIAL RATE: 79 BPM
EKG DIAGNOSIS: NORMAL
EKG P AXIS: 40 DEGREES
EKG P-R INTERVAL: 154 MS
EKG Q-T INTERVAL: 420 MS
EKG QRS DURATION: 86 MS
EKG QTC CALCULATION (BAZETT): 481 MS
EKG R AXIS: 35 DEGREES
EKG T AXIS: 51 DEGREES
EKG VENTRICULAR RATE: 79 BPM

## 2019-08-01 PROCEDURE — 93010 ELECTROCARDIOGRAM REPORT: CPT | Performed by: INTERNAL MEDICINE

## 2019-08-08 ENCOUNTER — APPOINTMENT (OUTPATIENT)
Dept: CT IMAGING | Age: 59
DRG: 640 | End: 2019-08-08
Payer: COMMERCIAL

## 2019-08-08 ENCOUNTER — APPOINTMENT (OUTPATIENT)
Dept: GENERAL RADIOLOGY | Age: 59
DRG: 640 | End: 2019-08-08
Payer: COMMERCIAL

## 2019-08-08 ENCOUNTER — HOSPITAL ENCOUNTER (INPATIENT)
Age: 59
LOS: 1 days | Discharge: NURSING, REHAB OR LONG-TERM CARE | DRG: 640 | End: 2019-08-13
Attending: EMERGENCY MEDICINE | Admitting: INTERNAL MEDICINE
Payer: COMMERCIAL

## 2019-08-08 DIAGNOSIS — R05.9 COUGH: ICD-10-CM

## 2019-08-08 DIAGNOSIS — J81.0 ACUTE PULMONARY EDEMA (HCC): ICD-10-CM
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
## 2019-08-08 DIAGNOSIS — R53.1 GENERALIZED WEAKNESS: ICD-10-CM

## 2019-08-08 DIAGNOSIS — R09.02 HYPOXIA: Primary | ICD-10-CM

## 2019-08-08 PROBLEM — R06.02 SOB (SHORTNESS OF BREATH): Status: ACTIVE | Noted: 2019-08-08

## 2019-08-08 LAB
A/G RATIO: 0.8 (ref 1.1–2.2)
ALBUMIN SERPL-MCNC: 3.7 G/DL (ref 3.4–5)
ALP BLD-CCNC: 82 U/L (ref 40–129)
ALT SERPL-CCNC: 11 U/L (ref 10–40)
ANION GAP SERPL CALCULATED.3IONS-SCNC: 12 MMOL/L (ref 3–16)
AST SERPL-CCNC: 18 U/L (ref 15–37)
BASOPHILS ABSOLUTE: 0 K/UL (ref 0–0.2)
BASOPHILS RELATIVE PERCENT: 0.4 %
BILIRUB SERPL-MCNC: 0.3 MG/DL (ref 0–1)
BUN BLDV-MCNC: 21 MG/DL (ref 7–20)
CALCIUM SERPL-MCNC: 9.6 MG/DL (ref 8.3–10.6)
CHLORIDE BLD-SCNC: 94 MMOL/L (ref 99–110)
CO2: 30 MMOL/L (ref 21–32)
CREAT SERPL-MCNC: 4.1 MG/DL (ref 0.8–1.3)
EOSINOPHILS ABSOLUTE: 0.8 K/UL (ref 0–0.6)
EOSINOPHILS RELATIVE PERCENT: 7.7 %
GFR AFRICAN AMERICAN: 18
GFR NON-AFRICAN AMERICAN: 15
GLOBULIN: 4.6 G/DL
GLUCOSE BLD-MCNC: 127 MG/DL (ref 70–99)
HCT VFR BLD CALC: 36.5 % (ref 40.5–52.5)
HEMOGLOBIN: 12 G/DL (ref 13.5–17.5)
LACTIC ACID, SEPSIS: 1 MMOL/L (ref 0.4–1.9)
LYMPHOCYTES ABSOLUTE: 0.6 K/UL (ref 1–5.1)
LYMPHOCYTES RELATIVE PERCENT: 5.3 %
MCH RBC QN AUTO: 31.1 PG (ref 26–34)
MCHC RBC AUTO-ENTMCNC: 32.9 G/DL (ref 31–36)
MCV RBC AUTO: 94.6 FL (ref 80–100)
MONOCYTES ABSOLUTE: 0.6 K/UL (ref 0–1.3)
MONOCYTES RELATIVE PERCENT: 6.2 %
NEUTROPHILS ABSOLUTE: 8.3 K/UL (ref 1.7–7.7)
NEUTROPHILS RELATIVE PERCENT: 80.4 %
PDW BLD-RTO: 14.8 % (ref 12.4–15.4)
PLATELET # BLD: 285 K/UL (ref 135–450)
PMV BLD AUTO: 7 FL (ref 5–10.5)
POTASSIUM REFLEX MAGNESIUM: 4 MMOL/L (ref 3.5–5.1)
RBC # BLD: 3.85 M/UL (ref 4.2–5.9)
SODIUM BLD-SCNC: 136 MMOL/L (ref 136–145)
TOTAL PROTEIN: 8.3 G/DL (ref 6.4–8.2)
TROPONIN: 0.04 NG/ML
WBC # BLD: 10.4 K/UL (ref 4–11)

## 2019-08-08 PROCEDURE — 6360000002 HC RX W HCPCS: Performed by: EMERGENCY MEDICINE

## 2019-08-08 PROCEDURE — 99285 EMERGENCY DEPT VISIT HI MDM: CPT

## 2019-08-08 PROCEDURE — 73030 X-RAY EXAM OF SHOULDER: CPT

## 2019-08-08 PROCEDURE — 94644 CONT INHLJ TX 1ST HOUR: CPT

## 2019-08-08 PROCEDURE — 83605 ASSAY OF LACTIC ACID: CPT

## 2019-08-08 PROCEDURE — 2700000000 HC OXYGEN THERAPY PER DAY

## 2019-08-08 PROCEDURE — 2580000003 HC RX 258: Performed by: EMERGENCY MEDICINE

## 2019-08-08 PROCEDURE — 84484 ASSAY OF TROPONIN QUANT: CPT

## 2019-08-08 PROCEDURE — 94761 N-INVAS EAR/PLS OXIMETRY MLT: CPT

## 2019-08-08 PROCEDURE — 85025 COMPLETE CBC W/AUTO DIFF WBC: CPT

## 2019-08-08 PROCEDURE — 80053 COMPREHEN METABOLIC PANEL: CPT

## 2019-08-08 PROCEDURE — 96365 THER/PROPH/DIAG IV INF INIT: CPT

## 2019-08-08 PROCEDURE — 87040 BLOOD CULTURE FOR BACTERIA: CPT

## 2019-08-08 PROCEDURE — 36415 COLL VENOUS BLD VENIPUNCTURE: CPT

## 2019-08-08 PROCEDURE — 87340 HEPATITIS B SURFACE AG IA: CPT

## 2019-08-08 PROCEDURE — 93005 ELECTROCARDIOGRAM TRACING: CPT | Performed by: EMERGENCY MEDICINE

## 2019-08-08 PROCEDURE — 6370000000 HC RX 637 (ALT 250 FOR IP): Performed by: EMERGENCY MEDICINE

## 2019-08-08 PROCEDURE — 94640 AIRWAY INHALATION TREATMENT: CPT

## 2019-08-08 PROCEDURE — 70450 CT HEAD/BRAIN W/O DYE: CPT

## 2019-08-08 PROCEDURE — 71046 X-RAY EXAM CHEST 2 VIEWS: CPT

## 2019-08-08 PROCEDURE — 96375 TX/PRO/DX INJ NEW DRUG ADDON: CPT

## 2019-08-08 PROCEDURE — 96368 THER/DIAG CONCURRENT INF: CPT

## 2019-08-08 PROCEDURE — G0378 HOSPITAL OBSERVATION PER HR: HCPCS

## 2019-08-08 RX ORDER — IPRATROPIUM BROMIDE AND ALBUTEROL SULFATE 2.5; .5 MG/3ML; MG/3ML
1 SOLUTION RESPIRATORY (INHALATION)
Status: DISCONTINUED | OUTPATIENT
Start: 2019-08-08 | End: 2019-08-08

## 2019-08-08 RX ORDER — METHYLPREDNISOLONE SODIUM SUCCINATE 125 MG/2ML
125 INJECTION, POWDER, LYOPHILIZED, FOR SOLUTION INTRAMUSCULAR; INTRAVENOUS ONCE
Status: COMPLETED | OUTPATIENT
Start: 2019-08-08 | End: 2019-08-08

## 2019-08-08 RX ORDER — IPRATROPIUM BROMIDE AND ALBUTEROL SULFATE 2.5; .5 MG/3ML; MG/3ML
3 SOLUTION RESPIRATORY (INHALATION) ONCE
Status: COMPLETED | OUTPATIENT
Start: 2019-08-08 | End: 2019-08-08

## 2019-08-08 RX ADMIN — METHYLPREDNISOLONE SODIUM SUCCINATE 125 MG: 125 INJECTION, POWDER, FOR SOLUTION INTRAMUSCULAR; INTRAVENOUS at 21:35

## 2019-08-08 RX ADMIN — AZITHROMYCIN MONOHYDRATE 500 MG: 500 INJECTION, POWDER, LYOPHILIZED, FOR SOLUTION INTRAVENOUS at 21:37

## 2019-08-08 RX ADMIN — IPRATROPIUM BROMIDE AND ALBUTEROL SULFATE 3 AMPULE: .5; 3 SOLUTION RESPIRATORY (INHALATION) at 19:38

## 2019-08-08 RX ADMIN — CEFTRIAXONE SODIUM 1 G: 1 INJECTION, POWDER, FOR SOLUTION INTRAMUSCULAR; INTRAVENOUS at 21:29

## 2019-08-08 ASSESSMENT — PAIN DESCRIPTION - LOCATION
LOCATION: SHOULDER
LOCATION: SHOULDER

## 2019-08-08 ASSESSMENT — ENCOUNTER SYMPTOMS
COUGH: 1
BACK PAIN: 0
DIARRHEA: 0
SORE THROAT: 0
CONSTIPATION: 0
NAUSEA: 0
SHORTNESS OF BREATH: 1
VOMITING: 0
ABDOMINAL PAIN: 0

## 2019-08-08 ASSESSMENT — PAIN SCALES - GENERAL
PAINLEVEL_OUTOF10: 3
PAINLEVEL_OUTOF10: 8
PAINLEVEL_OUTOF10: 8

## 2019-08-08 ASSESSMENT — PAIN DESCRIPTION - FREQUENCY
FREQUENCY: INTERMITTENT
FREQUENCY: INTERMITTENT

## 2019-08-08 ASSESSMENT — PAIN DESCRIPTION - ORIENTATION
ORIENTATION: LEFT
ORIENTATION: LEFT

## 2019-08-08 NOTE — ED PROVIDER NOTES
SHOULDER LEFT (MIN 2 VIEWS)   Final Result   No gross humeral fracture or dislocation. Multiple lucencies and interfaces are seen projecting over the distal left   clavicle, limiting its evaluation. Correlate with physical exam.  If there   is clinical concern for a clavicular fracture, dedicated clavicular   radiographs would be recommended. XR CHEST STANDARD (2 VW)   Final Result   1. Similar findings when compared with July 31, 2019 exam which again suggest   pulmonary edema with bilateral effusions and atelectasis.                LABS:  Labs Reviewed   CBC WITH AUTO DIFFERENTIAL - Abnormal; Notable for the following components:       Result Value    RBC 3.85 (*)     Hemoglobin 12.0 (*)     Hematocrit 36.5 (*)     Neutrophils # 8.3 (*)     Lymphocytes # 0.6 (*)     Eosinophils # 0.8 (*)     All other components within normal limits    Narrative:     Performed at:  [place] [address] [employer]   Phone [phone]   COMPREHENSIVE METABOLIC PANEL W/ REFLEX TO MG FOR LOW K - Abnormal; Notable for the following components:    Chloride 94 (*)     Glucose 127 (*)     BUN 21 (*)     CREATININE 4.1 (*)     GFR Non- 15 (*)     GFR  18 (*)     Total Protein 8.3 (*)     Albumin/Globulin Ratio 0.8 (*)     All other components within normal limits    Narrative:     Performed at:  [address], [facility] [employer]   Phone [phone]   TROPONIN - Abnormal; Notable for the following components:    Troponin 0.04 (*)     All other components within normal limits    Narrative:     Performed at:  [facility]) [address], [email or web address]   Phone [phone]   CULTURE BLOOD #1   CULTURE BLOOD #2   LACTATE, SEPSIS    Narrative:     Performed at:  [address] [facility] [employer]   Phone

## 2019-08-09 LAB
ANION GAP SERPL CALCULATED.3IONS-SCNC: 11 MMOL/L (ref 3–16)
BUN BLDV-MCNC: 32 MG/DL (ref 7–20)
CALCIUM SERPL-MCNC: 9.8 MG/DL (ref 8.3–10.6)
CHLORIDE BLD-SCNC: 93 MMOL/L (ref 99–110)
CO2: 28 MMOL/L (ref 21–32)
CREAT SERPL-MCNC: 5.5 MG/DL (ref 0.8–1.3)
EKG ATRIAL RATE: 70 BPM
EKG DIAGNOSIS: NORMAL
EKG P AXIS: 43 DEGREES
EKG P-R INTERVAL: 148 MS
EKG Q-T INTERVAL: 450 MS
EKG QRS DURATION: 94 MS
EKG QTC CALCULATION (BAZETT): 486 MS
EKG R AXIS: 18 DEGREES
EKG T AXIS: 7 DEGREES
EKG VENTRICULAR RATE: 70 BPM
GFR AFRICAN AMERICAN: 13
GFR NON-AFRICAN AMERICAN: 11
GLUCOSE BLD-MCNC: 128 MG/DL (ref 70–99)
GLUCOSE BLD-MCNC: 147 MG/DL (ref 70–99)
GLUCOSE BLD-MCNC: 154 MG/DL (ref 70–99)
GLUCOSE BLD-MCNC: 171 MG/DL (ref 70–99)
GLUCOSE BLD-MCNC: 249 MG/DL (ref 70–99)
HCT VFR BLD CALC: 36.3 % (ref 40.5–52.5)
HEMOGLOBIN: 12.2 G/DL (ref 13.5–17.5)
HEPATITIS B SURFACE ANTIGEN INTERPRETATION: NORMAL
MCH RBC QN AUTO: 31.8 PG (ref 26–34)
MCHC RBC AUTO-ENTMCNC: 33.7 G/DL (ref 31–36)
MCV RBC AUTO: 94.3 FL (ref 80–100)
PDW BLD-RTO: 15 % (ref 12.4–15.4)
PERFORMED ON: ABNORMAL
PLATELET # BLD: 264 K/UL (ref 135–450)
PMV BLD AUTO: 7 FL (ref 5–10.5)
POTASSIUM REFLEX MAGNESIUM: 4.2 MMOL/L (ref 3.5–5.1)
RBC # BLD: 3.85 M/UL (ref 4.2–5.9)
SODIUM BLD-SCNC: 132 MMOL/L (ref 136–145)
WBC # BLD: 9.1 K/UL (ref 4–11)

## 2019-08-09 PROCEDURE — G0378 HOSPITAL OBSERVATION PER HR: HCPCS

## 2019-08-09 PROCEDURE — 6360000002 HC RX W HCPCS: Performed by: INTERNAL MEDICINE

## 2019-08-09 PROCEDURE — 80048 BASIC METABOLIC PNL TOTAL CA: CPT

## 2019-08-09 PROCEDURE — 6370000000 HC RX 637 (ALT 250 FOR IP): Performed by: INTERNAL MEDICINE

## 2019-08-09 PROCEDURE — 2580000003 HC RX 258: Performed by: INTERNAL MEDICINE

## 2019-08-09 PROCEDURE — 97110 THERAPEUTIC EXERCISES: CPT

## 2019-08-09 PROCEDURE — 90935 HEMODIALYSIS ONE EVALUATION: CPT

## 2019-08-09 PROCEDURE — 85027 COMPLETE CBC AUTOMATED: CPT

## 2019-08-09 PROCEDURE — 94761 N-INVAS EAR/PLS OXIMETRY MLT: CPT

## 2019-08-09 PROCEDURE — 96375 TX/PRO/DX INJ NEW DRUG ADDON: CPT

## 2019-08-09 PROCEDURE — 36415 COLL VENOUS BLD VENIPUNCTURE: CPT

## 2019-08-09 PROCEDURE — 94640 AIRWAY INHALATION TREATMENT: CPT

## 2019-08-09 PROCEDURE — 96367 TX/PROPH/DG ADDL SEQ IV INF: CPT

## 2019-08-09 PROCEDURE — 96376 TX/PRO/DX INJ SAME DRUG ADON: CPT

## 2019-08-09 PROCEDURE — 96372 THER/PROPH/DIAG INJ SC/IM: CPT

## 2019-08-09 PROCEDURE — 97530 THERAPEUTIC ACTIVITIES: CPT

## 2019-08-09 PROCEDURE — 5A1D70Z PERFORMANCE OF URINARY FILTRATION, INTERMITTENT, LESS THAN 6 HOURS PER DAY: ICD-10-PCS | Performed by: INTERNAL MEDICINE

## 2019-08-09 PROCEDURE — 93010 ELECTROCARDIOGRAM REPORT: CPT | Performed by: INTERNAL MEDICINE

## 2019-08-09 PROCEDURE — 97162 PT EVAL MOD COMPLEX 30 MIN: CPT

## 2019-08-09 PROCEDURE — 96366 THER/PROPH/DIAG IV INF ADDON: CPT

## 2019-08-09 PROCEDURE — 2500000003 HC RX 250 WO HCPCS: Performed by: INTERNAL MEDICINE

## 2019-08-09 PROCEDURE — 2700000000 HC OXYGEN THERAPY PER DAY

## 2019-08-09 RX ORDER — ONDANSETRON 2 MG/ML
4 INJECTION INTRAMUSCULAR; INTRAVENOUS EVERY 6 HOURS PRN
Status: DISCONTINUED | OUTPATIENT
Start: 2019-08-09 | End: 2019-08-13 | Stop reason: HOSPADM

## 2019-08-09 RX ORDER — IPRATROPIUM BROMIDE AND ALBUTEROL SULFATE 2.5; .5 MG/3ML; MG/3ML
1 SOLUTION RESPIRATORY (INHALATION)
Status: DISCONTINUED | OUTPATIENT
Start: 2019-08-09 | End: 2019-08-13 | Stop reason: HOSPADM

## 2019-08-09 RX ORDER — ATORVASTATIN CALCIUM 40 MG/1
40 TABLET, FILM COATED ORAL DAILY
Status: DISCONTINUED | OUTPATIENT
Start: 2019-08-09 | End: 2019-08-13 | Stop reason: HOSPADM

## 2019-08-09 RX ORDER — CITALOPRAM 20 MG/1
40 TABLET ORAL DAILY
Status: DISCONTINUED | OUTPATIENT
Start: 2019-08-09 | End: 2019-08-13 | Stop reason: HOSPADM

## 2019-08-09 RX ORDER — ASPIRIN 81 MG/1
162 TABLET ORAL DAILY
Status: DISCONTINUED | OUTPATIENT
Start: 2019-08-09 | End: 2019-08-13 | Stop reason: HOSPADM

## 2019-08-09 RX ORDER — NICOTINE POLACRILEX 4 MG
15 LOZENGE BUCCAL PRN
Status: DISCONTINUED | OUTPATIENT
Start: 2019-08-09 | End: 2019-08-13 | Stop reason: HOSPADM

## 2019-08-09 RX ORDER — TRAZODONE HYDROCHLORIDE 50 MG/1
150 TABLET ORAL NIGHTLY
Status: DISCONTINUED | OUTPATIENT
Start: 2019-08-09 | End: 2019-08-13 | Stop reason: HOSPADM

## 2019-08-09 RX ORDER — DEXTROSE MONOHYDRATE 50 MG/ML
100 INJECTION, SOLUTION INTRAVENOUS PRN
Status: DISCONTINUED | OUTPATIENT
Start: 2019-08-09 | End: 2019-08-13 | Stop reason: HOSPADM

## 2019-08-09 RX ORDER — SEVELAMER CARBONATE 800 MG/1
3200 TABLET, FILM COATED ORAL 3 TIMES DAILY
Status: DISCONTINUED | OUTPATIENT
Start: 2019-08-09 | End: 2019-08-10

## 2019-08-09 RX ORDER — SODIUM CHLORIDE 0.9 % (FLUSH) 0.9 %
10 SYRINGE (ML) INJECTION EVERY 12 HOURS SCHEDULED
Status: DISCONTINUED | OUTPATIENT
Start: 2019-08-09 | End: 2019-08-13 | Stop reason: HOSPADM

## 2019-08-09 RX ORDER — AMIODARONE HYDROCHLORIDE 200 MG/1
100 TABLET ORAL DAILY
Status: DISCONTINUED | OUTPATIENT
Start: 2019-08-09 | End: 2019-08-13 | Stop reason: HOSPADM

## 2019-08-09 RX ORDER — METHYLPREDNISOLONE SODIUM SUCCINATE 40 MG/ML
40 INJECTION, POWDER, LYOPHILIZED, FOR SOLUTION INTRAMUSCULAR; INTRAVENOUS EVERY 12 HOURS
Status: DISCONTINUED | OUTPATIENT
Start: 2019-08-09 | End: 2019-08-10

## 2019-08-09 RX ORDER — CETIRIZINE HYDROCHLORIDE 5 MG/1
5 TABLET ORAL DAILY
Status: DISCONTINUED | OUTPATIENT
Start: 2019-08-09 | End: 2019-08-13 | Stop reason: HOSPADM

## 2019-08-09 RX ORDER — HEPARIN SODIUM 1000 [USP'U]/ML
2500 INJECTION, SOLUTION INTRAVENOUS; SUBCUTANEOUS PRN
Status: DISCONTINUED | OUTPATIENT
Start: 2019-08-09 | End: 2019-08-13 | Stop reason: HOSPADM

## 2019-08-09 RX ORDER — IPRATROPIUM BROMIDE AND ALBUTEROL SULFATE 2.5; .5 MG/3ML; MG/3ML
3 SOLUTION RESPIRATORY (INHALATION) EVERY 4 HOURS PRN
Status: DISCONTINUED | OUTPATIENT
Start: 2019-08-09 | End: 2019-08-09

## 2019-08-09 RX ORDER — DOCUSATE SODIUM 100 MG/1
100 CAPSULE, LIQUID FILLED ORAL 2 TIMES DAILY
Status: DISCONTINUED | OUTPATIENT
Start: 2019-08-09 | End: 2019-08-13 | Stop reason: HOSPADM

## 2019-08-09 RX ORDER — SODIUM CHLORIDE 0.9 % (FLUSH) 0.9 %
10 SYRINGE (ML) INJECTION PRN
Status: DISCONTINUED | OUTPATIENT
Start: 2019-08-09 | End: 2019-08-13 | Stop reason: HOSPADM

## 2019-08-09 RX ORDER — GUAIFENESIN/DEXTROMETHORPHAN 100-10MG/5
5 SYRUP ORAL 4 TIMES DAILY PRN
Status: DISCONTINUED | OUTPATIENT
Start: 2019-08-09 | End: 2019-08-13 | Stop reason: HOSPADM

## 2019-08-09 RX ORDER — HEPARIN SODIUM 5000 [USP'U]/ML
5000 INJECTION, SOLUTION INTRAVENOUS; SUBCUTANEOUS EVERY 8 HOURS SCHEDULED
Status: DISCONTINUED | OUTPATIENT
Start: 2019-08-09 | End: 2019-08-13 | Stop reason: HOSPADM

## 2019-08-09 RX ORDER — CHOLECALCIFEROL (VITAMIN D3) 10 MCG
1 TABLET ORAL DAILY
Status: DISCONTINUED | OUTPATIENT
Start: 2019-08-09 | End: 2019-08-13 | Stop reason: HOSPADM

## 2019-08-09 RX ORDER — DEXTROSE MONOHYDRATE 25 G/50ML
12.5 INJECTION, SOLUTION INTRAVENOUS PRN
Status: DISCONTINUED | OUTPATIENT
Start: 2019-08-09 | End: 2019-08-13 | Stop reason: HOSPADM

## 2019-08-09 RX ORDER — METOPROLOL TARTRATE 50 MG/1
50 TABLET, FILM COATED ORAL 2 TIMES DAILY
Status: DISCONTINUED | OUTPATIENT
Start: 2019-08-09 | End: 2019-08-13 | Stop reason: HOSPADM

## 2019-08-09 RX ADMIN — INSULIN LISPRO 2 UNITS: 100 INJECTION, SOLUTION INTRAVENOUS; SUBCUTANEOUS at 09:27

## 2019-08-09 RX ADMIN — AMIODARONE HYDROCHLORIDE 100 MG: 200 TABLET ORAL at 15:04

## 2019-08-09 RX ADMIN — METOPROLOL TARTRATE 50 MG: 50 TABLET ORAL at 20:12

## 2019-08-09 RX ADMIN — SEVELAMER CARBONATE 3200 MG: 800 TABLET, FILM COATED ORAL at 20:12

## 2019-08-09 RX ADMIN — HEPARIN SODIUM 5000 UNITS: 5000 INJECTION INTRAVENOUS; SUBCUTANEOUS at 22:22

## 2019-08-09 RX ADMIN — ATORVASTATIN CALCIUM 40 MG: 40 TABLET, FILM COATED ORAL at 15:05

## 2019-08-09 RX ADMIN — SEVELAMER CARBONATE 3200 MG: 800 TABLET, FILM COATED ORAL at 15:04

## 2019-08-09 RX ADMIN — IPRATROPIUM BROMIDE AND ALBUTEROL SULFATE 1 AMPULE: .5; 3 SOLUTION RESPIRATORY (INHALATION) at 07:54

## 2019-08-09 RX ADMIN — NEPHROCAP 1 MG: 1 CAP ORAL at 15:15

## 2019-08-09 RX ADMIN — DOCUSATE SODIUM 100 MG: 100 CAPSULE, LIQUID FILLED ORAL at 20:12

## 2019-08-09 RX ADMIN — CETIRIZINE HYDROCHLORIDE 5 MG: 5 TABLET ORAL at 15:14

## 2019-08-09 RX ADMIN — METOPROLOL TARTRATE 50 MG: 50 TABLET ORAL at 15:05

## 2019-08-09 RX ADMIN — DOCUSATE SODIUM 100 MG: 100 CAPSULE, LIQUID FILLED ORAL at 15:05

## 2019-08-09 RX ADMIN — DOCUSATE SODIUM 100 MG: 100 CAPSULE, LIQUID FILLED ORAL at 00:28

## 2019-08-09 RX ADMIN — Medication 10 ML: at 20:20

## 2019-08-09 RX ADMIN — HEPARIN SODIUM 5000 UNITS: 5000 INJECTION INTRAVENOUS; SUBCUTANEOUS at 15:05

## 2019-08-09 RX ADMIN — IPRATROPIUM BROMIDE AND ALBUTEROL SULFATE 1 AMPULE: .5; 3 SOLUTION RESPIRATORY (INHALATION) at 20:45

## 2019-08-09 RX ADMIN — METHYLPREDNISOLONE SODIUM SUCCINATE 40 MG: 40 INJECTION, POWDER, FOR SOLUTION INTRAMUSCULAR; INTRAVENOUS at 17:46

## 2019-08-09 RX ADMIN — INSULIN LISPRO 2 UNITS: 100 INJECTION, SOLUTION INTRAVENOUS; SUBCUTANEOUS at 17:46

## 2019-08-09 RX ADMIN — INSULIN LISPRO 2 UNITS: 100 INJECTION, SOLUTION INTRAVENOUS; SUBCUTANEOUS at 20:12

## 2019-08-09 RX ADMIN — IPRATROPIUM BROMIDE AND ALBUTEROL SULFATE 1 AMPULE: .5; 3 SOLUTION RESPIRATORY (INHALATION) at 16:19

## 2019-08-09 RX ADMIN — HEPARIN SODIUM 2500 UNITS: 1000 INJECTION INTRAVENOUS; SUBCUTANEOUS at 10:07

## 2019-08-09 RX ADMIN — HEPARIN SODIUM 5000 UNITS: 5000 INJECTION INTRAVENOUS; SUBCUTANEOUS at 05:56

## 2019-08-09 RX ADMIN — TRAZODONE HYDROCHLORIDE 150 MG: 50 TABLET ORAL at 00:28

## 2019-08-09 RX ADMIN — DOXYCYCLINE 100 MG: 100 INJECTION, POWDER, LYOPHILIZED, FOR SOLUTION INTRAVENOUS at 18:50

## 2019-08-09 RX ADMIN — CITALOPRAM HYDROBROMIDE 40 MG: 20 TABLET ORAL at 15:04

## 2019-08-09 RX ADMIN — TRAZODONE HYDROCHLORIDE 150 MG: 50 TABLET ORAL at 20:12

## 2019-08-09 RX ADMIN — CEFTRIAXONE SODIUM 1 G: 1 INJECTION, POWDER, FOR SOLUTION INTRAMUSCULAR; INTRAVENOUS at 17:46

## 2019-08-09 RX ADMIN — ASPIRIN 162 MG: 81 TABLET, COATED ORAL at 15:04

## 2019-08-09 RX ADMIN — METOPROLOL TARTRATE 50 MG: 50 TABLET ORAL at 00:28

## 2019-08-09 ASSESSMENT — PAIN DESCRIPTION - ORIENTATION: ORIENTATION: LEFT

## 2019-08-09 ASSESSMENT — PAIN SCALES - GENERAL: PAINLEVEL_OUTOF10: 1

## 2019-08-09 ASSESSMENT — PAIN DESCRIPTION - LOCATION: LOCATION: SHOULDER

## 2019-08-09 ASSESSMENT — PAIN DESCRIPTION - PAIN TYPE: TYPE: ACUTE PAIN

## 2019-08-09 NOTE — PROGRESS NOTES
Pt transfered from ER. Pt alert and oriented, VSS, 2L via NC, LUE restriction. 4 eye skin assessment performed with Primitivo Yang RN. Pt c/o left shoulder pain. Pt oriented to room, call light and bedside table within reach. Will continue to monitor.

## 2019-08-09 NOTE — PROGRESS NOTES
breaths per minute. Therapy will be held for heart rate (HR) greater than 140 beats per minute, pending direction from physician. 3. Bronchodilators will be administered via Metered Dose Inhaler (MDI) with spacer when the following criteria are met:  a. Alert and cooperative     b. HR < 140 bpm  c. RR < 30 bpm                d. Can demonstrate a 2-3 second inspiratory hold  4. Bronchodilators will be administered via Hand Held Nebulizer [facility]) to patients when ANY of the following criteria are met  a. Incognizant or uncooperative          b. Patients treated with HHN at Home        c. Unable to demonstrate proper use of MDI with spacer     d. RR > 30 bpm   5. Bronchodilators will be delivered via Metered Dose Inhaler (MDI), HHN, Aerogen to intubated patients on mechanical ventilation. 6. Inhalation medication orders will be delivered and/or substituted as outlined below. Aerosolized Medications Ordering and Administration Guidelines:    1. All Medications will be ordered by a physician, and their frequency and/or modality will be adjusted as defined by the patients Respiratory Severity Index (RSI) score. 2. If the patient does not have documented COPD, consider discontinuing anticholinergics when RSI is less than 9.  3. If the bronchospasm worsens (increased RSI), then the bronchodilator frequency can be increased to a maximum of every 4 hours. If greater than every 4 hours is required, the physician will be contacted. 4. If the bronchospasm improves, the frequency of the bronchodilator can be decreased, based on the patient's RSI, but not less than home treatment regimen frequency. 5. Bronchodilator(s) will be discontinued if patient has a RSI less than 9 and has received no scheduled or as needed treatment for 72  Hrs. Patients Ordered on a Mucolytic Agent:    1. Must always be administered with a bronchodilator.     2. Discontinue if patient experiences worsened bronchospasm, or secretions have lessened

## 2019-08-09 NOTE — PROGRESS NOTES
Co-treatment   Time In 6281         Time Out 1626         Minutes 33         Timed Code Treatment Minutes: 100 Akira Caballero, PT

## 2019-08-09 NOTE — FLOWSHEET NOTE
Treatment time: 4 hours  Net UF: 3500 ml    Pre weight: 100.8 kg   Post weight: 97.8 kg  EDW: TBD kg ( HD clinic DW: 101 kg)    Access used: Left upper arm AVF  Access function: Good with -450 ml/min    Medications or blood products given: no    Regular outpatient schedule: T.T.S, ( Dialysis today due to SOB)    Summary of response to treatment: Good, HD completed in full, Hemostasis less than 10 minutes. Gauze covered. Thrill and bruit are present. Copy of dialysis treatment record placed in chart, to be scanned into EMR.     08/09/19 0957 08/09/19 1416   Vital Signs   /80 126/67   Temp 97.6 °F (36.4 °C) 97.5 °F (36.4 °C)   Pulse 82 59   Resp 20 16   Weight 222 lb 3.6 oz (100.8 kg) 215 lb 9.8 oz (97.8 kg)   Weight Method Bed scale  (1 pillow, 1 sheet and 1 pad) Bed scale   Percent Weight Change 2.55 -2.98   Post-Hemodialysis Assessment   Post-Treatment Procedures  --  Blood returned; Access bleeding time < 10 minutes   Machine Disinfection Process  --  Acid/Vinegar Clean;Heat Disinfect; Exterior Machine Disinfection   Rinseback Volume (ml)  --  400 ml   Total Liters Processed (l/min)  --  93.4 l/min   Dialyzer Clearance  --  Lightly streaked   Duration of Treatment (minutes)  --  240 minutes   Heparin amount administered during treatment (units)  --  2500 units   Hemodialysis Intake (ml)  --  400 ml   Hemodialysis Output (ml)  --  3900 ml   NET Removed (ml)  --  3500 ml   Tolerated Treatment  --  Good
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
## 2019-08-10 ENCOUNTER — APPOINTMENT (OUTPATIENT)
Dept: GENERAL RADIOLOGY | Age: 59
DRG: 640 | End: 2019-08-10
Payer: COMMERCIAL

## 2019-08-10 LAB
ALBUMIN SERPL-MCNC: 3.3 G/DL (ref 3.4–5)
ANION GAP SERPL CALCULATED.3IONS-SCNC: 10 MMOL/L (ref 3–16)
BUN BLDV-MCNC: 28 MG/DL (ref 7–20)
CALCIUM SERPL-MCNC: 9.9 MG/DL (ref 8.3–10.6)
CHLORIDE BLD-SCNC: 94 MMOL/L (ref 99–110)
CO2: 27 MMOL/L (ref 21–32)
CREAT SERPL-MCNC: 4.2 MG/DL (ref 0.8–1.3)
GFR AFRICAN AMERICAN: 18
GFR NON-AFRICAN AMERICAN: 14
GLUCOSE BLD-MCNC: 112 MG/DL (ref 70–99)
GLUCOSE BLD-MCNC: 134 MG/DL (ref 70–99)
GLUCOSE BLD-MCNC: 153 MG/DL (ref 70–99)
GLUCOSE BLD-MCNC: 273 MG/DL (ref 70–99)
HCT VFR BLD CALC: 36.7 % (ref 40.5–52.5)
HEMOGLOBIN: 12.2 G/DL (ref 13.5–17.5)
MCH RBC QN AUTO: 31.2 PG (ref 26–34)
MCHC RBC AUTO-ENTMCNC: 33.3 G/DL (ref 31–36)
MCV RBC AUTO: 93.7 FL (ref 80–100)
PDW BLD-RTO: 15.2 % (ref 12.4–15.4)
PERFORMED ON: ABNORMAL
PHOSPHORUS: 2.9 MG/DL (ref 2.5–4.9)
PLATELET # BLD: 301 K/UL (ref 135–450)
PMV BLD AUTO: 7.4 FL (ref 5–10.5)
POTASSIUM SERPL-SCNC: 4.7 MMOL/L (ref 3.5–5.1)
RBC # BLD: 3.91 M/UL (ref 4.2–5.9)
SODIUM BLD-SCNC: 131 MMOL/L (ref 136–145)
WBC # BLD: 16.9 K/UL (ref 4–11)

## 2019-08-10 PROCEDURE — 36415 COLL VENOUS BLD VENIPUNCTURE: CPT

## 2019-08-10 PROCEDURE — 6370000000 HC RX 637 (ALT 250 FOR IP): Performed by: INTERNAL MEDICINE

## 2019-08-10 PROCEDURE — 2580000003 HC RX 258: Performed by: INTERNAL MEDICINE

## 2019-08-10 PROCEDURE — 97530 THERAPEUTIC ACTIVITIES: CPT

## 2019-08-10 PROCEDURE — 94761 N-INVAS EAR/PLS OXIMETRY MLT: CPT

## 2019-08-10 PROCEDURE — 94640 AIRWAY INHALATION TREATMENT: CPT

## 2019-08-10 PROCEDURE — 96376 TX/PRO/DX INJ SAME DRUG ADON: CPT

## 2019-08-10 PROCEDURE — 97535 SELF CARE MNGMENT TRAINING: CPT

## 2019-08-10 PROCEDURE — 6360000002 HC RX W HCPCS: Performed by: INTERNAL MEDICINE

## 2019-08-10 PROCEDURE — 2500000003 HC RX 250 WO HCPCS: Performed by: INTERNAL MEDICINE

## 2019-08-10 PROCEDURE — G0378 HOSPITAL OBSERVATION PER HR: HCPCS

## 2019-08-10 PROCEDURE — 85027 COMPLETE CBC AUTOMATED: CPT

## 2019-08-10 PROCEDURE — 80069 RENAL FUNCTION PANEL: CPT

## 2019-08-10 PROCEDURE — 2700000000 HC OXYGEN THERAPY PER DAY

## 2019-08-10 PROCEDURE — 94669 MECHANICAL CHEST WALL OSCILL: CPT

## 2019-08-10 PROCEDURE — 96366 THER/PROPH/DIAG IV INF ADDON: CPT

## 2019-08-10 PROCEDURE — 90935 HEMODIALYSIS ONE EVALUATION: CPT

## 2019-08-10 PROCEDURE — 97166 OT EVAL MOD COMPLEX 45 MIN: CPT

## 2019-08-10 PROCEDURE — 71046 X-RAY EXAM CHEST 2 VIEWS: CPT

## 2019-08-10 PROCEDURE — 96372 THER/PROPH/DIAG INJ SC/IM: CPT

## 2019-08-10 RX ORDER — SEVELAMER CARBONATE 800 MG/1
2400 TABLET, FILM COATED ORAL 3 TIMES DAILY
Status: DISCONTINUED | OUTPATIENT
Start: 2019-08-10 | End: 2019-08-13 | Stop reason: HOSPADM

## 2019-08-10 RX ORDER — PREDNISONE 20 MG/1
40 TABLET ORAL DAILY
Status: DISCONTINUED | OUTPATIENT
Start: 2019-08-10 | End: 2019-08-13 | Stop reason: HOSPADM

## 2019-08-10 RX ADMIN — SODIUM CHLORIDE, PRESERVATIVE FREE 10 ML: 5 INJECTION INTRAVENOUS at 05:10

## 2019-08-10 RX ADMIN — Medication 10 ML: at 09:06

## 2019-08-10 RX ADMIN — ATORVASTATIN CALCIUM 40 MG: 40 TABLET, FILM COATED ORAL at 09:03

## 2019-08-10 RX ADMIN — CETIRIZINE HYDROCHLORIDE 5 MG: 5 TABLET ORAL at 09:09

## 2019-08-10 RX ADMIN — AMIODARONE HYDROCHLORIDE 100 MG: 200 TABLET ORAL at 09:03

## 2019-08-10 RX ADMIN — NEPHROCAP 1 MG: 1 CAP ORAL at 09:09

## 2019-08-10 RX ADMIN — Medication 10 ML: at 20:14

## 2019-08-10 RX ADMIN — METOPROLOL TARTRATE 50 MG: 50 TABLET ORAL at 09:04

## 2019-08-10 RX ADMIN — TRAZODONE HYDROCHLORIDE 150 MG: 50 TABLET ORAL at 20:12

## 2019-08-10 RX ADMIN — IPRATROPIUM BROMIDE AND ALBUTEROL SULFATE 1 AMPULE: .5; 3 SOLUTION RESPIRATORY (INHALATION) at 20:36

## 2019-08-10 RX ADMIN — METOPROLOL TARTRATE 50 MG: 50 TABLET ORAL at 20:12

## 2019-08-10 RX ADMIN — DOCUSATE SODIUM 100 MG: 100 CAPSULE, LIQUID FILLED ORAL at 20:12

## 2019-08-10 RX ADMIN — SEVELAMER CARBONATE 3200 MG: 800 TABLET, FILM COATED ORAL at 09:04

## 2019-08-10 RX ADMIN — IPRATROPIUM BROMIDE AND ALBUTEROL SULFATE 1 AMPULE: .5; 3 SOLUTION RESPIRATORY (INHALATION) at 11:48

## 2019-08-10 RX ADMIN — DOXYCYCLINE 100 MG: 100 INJECTION, POWDER, LYOPHILIZED, FOR SOLUTION INTRAVENOUS at 05:06

## 2019-08-10 RX ADMIN — PREDNISONE 40 MG: 20 TABLET ORAL at 09:09

## 2019-08-10 RX ADMIN — DOXYCYCLINE 100 MG: 100 INJECTION, POWDER, LYOPHILIZED, FOR SOLUTION INTRAVENOUS at 20:17

## 2019-08-10 RX ADMIN — INSULIN LISPRO 6 UNITS: 100 INJECTION, SOLUTION INTRAVENOUS; SUBCUTANEOUS at 12:31

## 2019-08-10 RX ADMIN — IPRATROPIUM BROMIDE AND ALBUTEROL SULFATE 1 AMPULE: .5; 3 SOLUTION RESPIRATORY (INHALATION) at 08:46

## 2019-08-10 RX ADMIN — METHYLPREDNISOLONE SODIUM SUCCINATE 40 MG: 40 INJECTION, POWDER, FOR SOLUTION INTRAMUSCULAR; INTRAVENOUS at 05:06

## 2019-08-10 RX ADMIN — CEFTRIAXONE SODIUM 1 G: 1 INJECTION, POWDER, FOR SOLUTION INTRAMUSCULAR; INTRAVENOUS at 19:48

## 2019-08-10 RX ADMIN — HEPARIN SODIUM 5000 UNITS: 5000 INJECTION INTRAVENOUS; SUBCUTANEOUS at 20:19

## 2019-08-10 RX ADMIN — HEPARIN SODIUM 5000 UNITS: 5000 INJECTION INTRAVENOUS; SUBCUTANEOUS at 05:06

## 2019-08-10 RX ADMIN — SEVELAMER CARBONATE 2400 MG: 800 TABLET, FILM COATED ORAL at 20:11

## 2019-08-10 RX ADMIN — DOCUSATE SODIUM 100 MG: 100 CAPSULE, LIQUID FILLED ORAL at 09:04

## 2019-08-10 RX ADMIN — CITALOPRAM HYDROBROMIDE 40 MG: 20 TABLET ORAL at 09:03

## 2019-08-10 RX ADMIN — INSULIN LISPRO 1 UNITS: 100 INJECTION, SOLUTION INTRAVENOUS; SUBCUTANEOUS at 20:19

## 2019-08-10 RX ADMIN — ASPIRIN 162 MG: 81 TABLET, COATED ORAL at 09:04

## 2019-08-10 ASSESSMENT — PAIN DESCRIPTION - PAIN TYPE: TYPE: ACUTE PAIN

## 2019-08-10 ASSESSMENT — PAIN DESCRIPTION - DESCRIPTORS: DESCRIPTORS: SHARP

## 2019-08-10 ASSESSMENT — PAIN DESCRIPTION - LOCATION: LOCATION: SHOULDER

## 2019-08-10 ASSESSMENT — PAIN SCALES - GENERAL: PAINLEVEL_OUTOF10: 4

## 2019-08-10 ASSESSMENT — PAIN DESCRIPTION - ORIENTATION: ORIENTATION: LEFT

## 2019-08-10 NOTE — PROGRESS NOTES
Shift assessments completed and charted. Pt alert and oriented, VSS, O2 @ 1.5L via NC, pt not in distress. Moist productive cough along with wheezings throughout all lungs fields auscultated. Pt denies any needs at the moment. Will continue to monitor.

## 2019-08-10 NOTE — PROGRESS NOTES
Hospitalist Progress Note      PCP: [place] [facility]    Date of Admission: 8/8/2019    Chief Complaint: SOB     Hospital Course: H & P reviewed. Admitted with fluid overload, pt with ESRD on HD T-Th-Sat. Subjective:     S/p HD yesterday. Feels better today SOB with cough improving. Denied any fever or chills      Medications:  Reviewed    Infusion Medications    dextrose       Scheduled Medications    amiodarone  100 mg Oral Daily    atorvastatin  40 mg Oral Daily    aspirin EC  162 mg Oral Daily    b complex-C-folic acid  1 mg Oral Daily    citalopram  40 mg Oral Daily    docusate sodium  100 mg Oral BID    cetirizine  5 mg Oral Daily    metoprolol tartrate  50 mg Oral BID    sevelamer  3,200 mg Oral TID    traZODone  150 mg Oral Nightly    sodium chloride flush  10 mL Intravenous 2 times per day    heparin (porcine)  5,000 Units Subcutaneous 3 times per day    ipratropium-albuterol  1 ampule Inhalation Q4H WA    insulin lispro  0-12 Units Subcutaneous TID WC    insulin lispro  0-6 Units Subcutaneous Nightly    cefTRIAXone (ROCEPHIN) IV  1 g Intravenous Q24H    methylPREDNISolone  40 mg Intravenous Q12H    doxycycline (VIBRAMYCIN) IV  100 mg Intravenous Q12H     PRN Meds: guaiFENesin-dextromethorphan, sodium chloride flush, magnesium hydroxide, ondansetron, glucose, dextrose, glucagon (rDNA), dextrose, heparin (porcine)      Intake/Output Summary (Last 24 hours) at 8/10/2019 0843  Last data filed at 8/10/2019 0506  Gross per 24 hour   Intake 1710 ml   Output 3900 ml   Net -2190 ml       Physical Exam Performed:    /73   Pulse 59   Temp 97.7 °F (36.5 °C) (Axillary)   Resp 16   Ht 5' 10.5\" (1.791 m)   Wt 215 lb 9.8 oz (97.8 kg)   SpO2 98%   BMI 30.50 kg/m²     General appearance: No apparent distress, appears stated age and cooperative. HEENT: Pupils equal, round, Conjunctivae/corneas clear. Neck: Supple, with full range of motion. No jugular venous distention. 1. Similar findings when compared with July 31, 2019 exam which again suggest   pulmonary edema with bilateral effusions and atelectasis. XR CHEST STANDARD (2 VW)    (Results Pending)     Slightly increased severity of left basilar edema versus atelectasis. Unchanged edema and atelectasis, and bilateral pleural effusions elsewhere.                 Assessment/Plan:    Active Hospital Problems    Diagnosis    SOB (shortness of breath) [R06.02]        Pulmonary edema, fluid overload: on HD T-Th -Sat. Nephro assisting. S/p  HD yesterday , to get HD today per his regular schedule     ESRD : on HD as stated above         Presumed pneumonia: was having productive cough with SOB now improved. Repeat CXR reviewed- fairly unchanged. Continue empiric abx for now. COPD exacerbation: suspected, improved and less  wheezy today. Switch steroids to PO. Continue duonebs     Leucocytosis : likely due to steroids as was normal on admission. Afebrile. Monitor         Chronic resp failure: on 2L chronically. Stable       Generalized weakness, s/p fall at home, no trauma, loss of consciousness .  PT/OT eval         DVT Prophylaxis: ordered   Diet: DIET CARDIAC;  Code Status: Full Code    PT/OT Eval Status: ordered     Dispo -  1-2 days pending clinical course     Lamont Nesbitt MD

## 2019-08-10 NOTE — FLOWSHEET NOTE
08/10/19 1458 08/10/19 1858   Vital Signs   BP (!) 154/86 121/66   Temp 97.8 °F (36.6 °C) 98 °F (36.7 °C)   Pulse 71 60   Resp 16 16   Weight 216 lb 14.9 oz (98.4 kg) 211 lb 3.2 oz (95.8 kg)   Weight Method Bed scale Bed scale   Percent Weight Change 0.61 -2.05       Treatment time: 4 hr   Net UF: 2600mL    Pre weight: 98.4kg  Post weight: 95.8  EDW: tbd    Access used: RAVF  Access function: patent     Medications or blood products given: heparin bolus 1000 units and 500units   Regular outpatient schedule: TTS    Summary of response to treatment: tolerated well    Copy of dialysis treatment record placed in chart, to be scanned into EMR.

## 2019-08-11 ENCOUNTER — APPOINTMENT (OUTPATIENT)
Dept: GENERAL RADIOLOGY | Age: 59
DRG: 640 | End: 2019-08-11
Payer: COMMERCIAL

## 2019-08-11 LAB
ALBUMIN SERPL-MCNC: 3.4 G/DL (ref 3.4–5)
ANION GAP SERPL CALCULATED.3IONS-SCNC: 11 MMOL/L (ref 3–16)
BASOPHILS ABSOLUTE: 0.1 K/UL (ref 0–0.2)
BASOPHILS RELATIVE PERCENT: 0.3 %
BUN BLDV-MCNC: 29 MG/DL (ref 7–20)
CALCIUM SERPL-MCNC: 10.1 MG/DL (ref 8.3–10.6)
CHLORIDE BLD-SCNC: 93 MMOL/L (ref 99–110)
CO2: 28 MMOL/L (ref 21–32)
CREAT SERPL-MCNC: 3.9 MG/DL (ref 0.8–1.3)
EOSINOPHILS ABSOLUTE: 0 K/UL (ref 0–0.6)
EOSINOPHILS RELATIVE PERCENT: 0 %
GFR AFRICAN AMERICAN: 19
GFR NON-AFRICAN AMERICAN: 16
GLUCOSE BLD-MCNC: 129 MG/DL (ref 70–99)
GLUCOSE BLD-MCNC: 134 MG/DL (ref 70–99)
GLUCOSE BLD-MCNC: 148 MG/DL (ref 70–99)
GLUCOSE BLD-MCNC: 193 MG/DL (ref 70–99)
GLUCOSE BLD-MCNC: 207 MG/DL (ref 70–99)
HCT VFR BLD CALC: 40.7 % (ref 40.5–52.5)
HEMOGLOBIN: 13.3 G/DL (ref 13.5–17.5)
LYMPHOCYTES ABSOLUTE: 1 K/UL (ref 1–5.1)
LYMPHOCYTES RELATIVE PERCENT: 6.5 %
MCH RBC QN AUTO: 31 PG (ref 26–34)
MCHC RBC AUTO-ENTMCNC: 32.7 G/DL (ref 31–36)
MCV RBC AUTO: 95 FL (ref 80–100)
MONOCYTES ABSOLUTE: 0.9 K/UL (ref 0–1.3)
MONOCYTES RELATIVE PERCENT: 6.1 %
NEUTROPHILS ABSOLUTE: 13.1 K/UL (ref 1.7–7.7)
NEUTROPHILS RELATIVE PERCENT: 87.1 %
PDW BLD-RTO: 15.5 % (ref 12.4–15.4)
PERFORMED ON: ABNORMAL
PHOSPHORUS: 3 MG/DL (ref 2.5–4.9)
PLATELET # BLD: 279 K/UL (ref 135–450)
PMV BLD AUTO: 7.4 FL (ref 5–10.5)
POTASSIUM SERPL-SCNC: 4.4 MMOL/L (ref 3.5–5.1)
RBC # BLD: 4.28 M/UL (ref 4.2–5.9)
SODIUM BLD-SCNC: 132 MMOL/L (ref 136–145)
WBC # BLD: 15.1 K/UL (ref 4–11)

## 2019-08-11 PROCEDURE — G0378 HOSPITAL OBSERVATION PER HR: HCPCS

## 2019-08-11 PROCEDURE — 96372 THER/PROPH/DIAG INJ SC/IM: CPT

## 2019-08-11 PROCEDURE — 6370000000 HC RX 637 (ALT 250 FOR IP): Performed by: INTERNAL MEDICINE

## 2019-08-11 PROCEDURE — 2500000003 HC RX 250 WO HCPCS: Performed by: INTERNAL MEDICINE

## 2019-08-11 PROCEDURE — 6360000002 HC RX W HCPCS: Performed by: INTERNAL MEDICINE

## 2019-08-11 PROCEDURE — 2700000000 HC OXYGEN THERAPY PER DAY

## 2019-08-11 PROCEDURE — 2580000003 HC RX 258: Performed by: INTERNAL MEDICINE

## 2019-08-11 PROCEDURE — 94669 MECHANICAL CHEST WALL OSCILL: CPT

## 2019-08-11 PROCEDURE — 96366 THER/PROPH/DIAG IV INF ADDON: CPT

## 2019-08-11 PROCEDURE — 73030 X-RAY EXAM OF SHOULDER: CPT

## 2019-08-11 PROCEDURE — 80069 RENAL FUNCTION PANEL: CPT

## 2019-08-11 PROCEDURE — 36415 COLL VENOUS BLD VENIPUNCTURE: CPT

## 2019-08-11 PROCEDURE — 94640 AIRWAY INHALATION TREATMENT: CPT

## 2019-08-11 PROCEDURE — 85025 COMPLETE CBC W/AUTO DIFF WBC: CPT

## 2019-08-11 PROCEDURE — 94761 N-INVAS EAR/PLS OXIMETRY MLT: CPT

## 2019-08-11 RX ORDER — SODIUM CHLORIDE 9 MG/ML
INJECTION, SOLUTION INTRAVENOUS
Status: DISCONTINUED
Start: 2019-08-11 | End: 2019-08-12

## 2019-08-11 RX ORDER — ACETAMINOPHEN 325 MG/1
650 TABLET ORAL EVERY 4 HOURS PRN
Status: DISCONTINUED | OUTPATIENT
Start: 2019-08-11 | End: 2019-08-13 | Stop reason: HOSPADM

## 2019-08-11 RX ADMIN — INSULIN LISPRO 2 UNITS: 100 INJECTION, SOLUTION INTRAVENOUS; SUBCUTANEOUS at 12:26

## 2019-08-11 RX ADMIN — PREDNISONE 40 MG: 20 TABLET ORAL at 10:46

## 2019-08-11 RX ADMIN — DOXYCYCLINE 100 MG: 100 INJECTION, POWDER, LYOPHILIZED, FOR SOLUTION INTRAVENOUS at 17:46

## 2019-08-11 RX ADMIN — DOCUSATE SODIUM 100 MG: 100 CAPSULE, LIQUID FILLED ORAL at 20:52

## 2019-08-11 RX ADMIN — TRAZODONE HYDROCHLORIDE 150 MG: 50 TABLET ORAL at 20:52

## 2019-08-11 RX ADMIN — NEPHROCAP 1 MG: 1 CAP ORAL at 10:47

## 2019-08-11 RX ADMIN — ATORVASTATIN CALCIUM 40 MG: 40 TABLET, FILM COATED ORAL at 10:47

## 2019-08-11 RX ADMIN — ASPIRIN 162 MG: 81 TABLET, COATED ORAL at 10:54

## 2019-08-11 RX ADMIN — INSULIN LISPRO 2 UNITS: 100 INJECTION, SOLUTION INTRAVENOUS; SUBCUTANEOUS at 20:53

## 2019-08-11 RX ADMIN — CITALOPRAM HYDROBROMIDE 40 MG: 20 TABLET ORAL at 10:48

## 2019-08-11 RX ADMIN — HEPARIN SODIUM 5000 UNITS: 5000 INJECTION INTRAVENOUS; SUBCUTANEOUS at 06:08

## 2019-08-11 RX ADMIN — HEPARIN SODIUM 5000 UNITS: 5000 INJECTION INTRAVENOUS; SUBCUTANEOUS at 20:53

## 2019-08-11 RX ADMIN — CEFTRIAXONE SODIUM 1 G: 1 INJECTION, POWDER, FOR SOLUTION INTRAMUSCULAR; INTRAVENOUS at 16:29

## 2019-08-11 RX ADMIN — CETIRIZINE HYDROCHLORIDE 5 MG: 5 TABLET ORAL at 10:47

## 2019-08-11 RX ADMIN — DOXYCYCLINE 100 MG: 100 INJECTION, POWDER, LYOPHILIZED, FOR SOLUTION INTRAVENOUS at 06:08

## 2019-08-11 RX ADMIN — GUAIFENESIN AND DEXTROMETHORPHAN 5 ML: 100; 10 SYRUP ORAL at 12:45

## 2019-08-11 RX ADMIN — SEVELAMER CARBONATE 2400 MG: 800 TABLET, FILM COATED ORAL at 10:46

## 2019-08-11 RX ADMIN — HEPARIN SODIUM 5000 UNITS: 5000 INJECTION INTRAVENOUS; SUBCUTANEOUS at 16:36

## 2019-08-11 RX ADMIN — AMIODARONE HYDROCHLORIDE 100 MG: 200 TABLET ORAL at 10:47

## 2019-08-11 RX ADMIN — INSULIN LISPRO 2 UNITS: 100 INJECTION, SOLUTION INTRAVENOUS; SUBCUTANEOUS at 17:47

## 2019-08-11 RX ADMIN — IPRATROPIUM BROMIDE AND ALBUTEROL SULFATE 1 AMPULE: .5; 3 SOLUTION RESPIRATORY (INHALATION) at 11:49

## 2019-08-11 RX ADMIN — METOPROLOL TARTRATE 50 MG: 50 TABLET ORAL at 10:47

## 2019-08-11 RX ADMIN — Medication 10 ML: at 10:55

## 2019-08-11 RX ADMIN — SEVELAMER CARBONATE 2400 MG: 800 TABLET, FILM COATED ORAL at 20:52

## 2019-08-11 RX ADMIN — IPRATROPIUM BROMIDE AND ALBUTEROL SULFATE 1 AMPULE: .5; 3 SOLUTION RESPIRATORY (INHALATION) at 15:47

## 2019-08-11 RX ADMIN — Medication 10 ML: at 20:52

## 2019-08-11 RX ADMIN — DOCUSATE SODIUM 100 MG: 100 CAPSULE, LIQUID FILLED ORAL at 10:47

## 2019-08-11 RX ADMIN — METOPROLOL TARTRATE 50 MG: 50 TABLET ORAL at 20:52

## 2019-08-11 RX ADMIN — SEVELAMER CARBONATE 2400 MG: 800 TABLET, FILM COATED ORAL at 16:37

## 2019-08-11 RX ADMIN — IPRATROPIUM BROMIDE AND ALBUTEROL SULFATE 1 AMPULE: .5; 3 SOLUTION RESPIRATORY (INHALATION) at 19:24

## 2019-08-11 RX ADMIN — ACETAMINOPHEN 650 MG: 325 TABLET ORAL at 13:40

## 2019-08-11 RX ADMIN — IPRATROPIUM BROMIDE AND ALBUTEROL SULFATE 1 AMPULE: .5; 3 SOLUTION RESPIRATORY (INHALATION) at 07:58

## 2019-08-11 RX ADMIN — GUAIFENESIN AND DEXTROMETHORPHAN 5 ML: 100; 10 SYRUP ORAL at 22:07

## 2019-08-11 ASSESSMENT — PAIN SCALES - GENERAL
PAINLEVEL_OUTOF10: 5
PAINLEVEL_OUTOF10: 5
PAINLEVEL_OUTOF10: 1
PAINLEVEL_OUTOF10: 0

## 2019-08-11 ASSESSMENT — PAIN DESCRIPTION - PAIN TYPE: TYPE: ACUTE PAIN

## 2019-08-11 NOTE — PROGRESS NOTES
Pt choked on pancake at breakfast. Pt said it got caught in his throat. Pt states that he was eating too fast. Dr. Marci Todd ordered swallow evaluation and diet changed. Antonieta

## 2019-08-11 NOTE — PROGRESS NOTES
Hospitalist Progress Note      PCP: [place] [facility]    Date of Admission: 8/8/2019    Chief Complaint: SOB     Hospital Course: H & P reviewed. Admitted with fluid overload, pt with ESRD on HD T-Th-Sat. Subjective:     S/p HD yesterday. Patient states he ate his food too fast this morning because he was hungry and choked but threw it up afterwards. SOB and cough improving. Denied any fever or chills          Medications:  Reviewed    Infusion Medications    dextrose       Scheduled Medications    predniSONE  40 mg Oral Daily    sevelamer  2,400 mg Oral TID    amiodarone  100 mg Oral Daily    atorvastatin  40 mg Oral Daily    aspirin EC  162 mg Oral Daily    b complex-C-folic acid  1 mg Oral Daily    citalopram  40 mg Oral Daily    docusate sodium  100 mg Oral BID    cetirizine  5 mg Oral Daily    metoprolol tartrate  50 mg Oral BID    traZODone  150 mg Oral Nightly    sodium chloride flush  10 mL Intravenous 2 times per day    heparin (porcine)  5,000 Units Subcutaneous 3 times per day    ipratropium-albuterol  1 ampule Inhalation Q4H WA    insulin lispro  0-12 Units Subcutaneous TID WC    insulin lispro  0-6 Units Subcutaneous Nightly    cefTRIAXone (ROCEPHIN) IV  1 g Intravenous Q24H    doxycycline (VIBRAMYCIN) IV  100 mg Intravenous Q12H     PRN Meds: guaiFENesin-dextromethorphan, sodium chloride flush, magnesium hydroxide, ondansetron, glucose, dextrose, glucagon (rDNA), dextrose, heparin (porcine)      Intake/Output Summary (Last 24 hours) at 8/11/2019 0938  Last data filed at 8/11/2019 0300  Gross per 24 hour   Intake 610 ml   Output --   Net 610 ml       Physical Exam Performed:    /68   Pulse 51   Temp 98.1 °F (36.7 °C) (Oral)   Resp 16   Ht 5' 10.5\" (1.791 m)   Wt 211 lb 3.2 oz (95.8 kg)   SpO2 100%   BMI 29.88 kg/m²     General appearance: No apparent distress, appears stated age and cooperative. HEENT: Pupils equal, round, Conjunctivae/corneas clear.

## 2019-08-12 PROBLEM — J18.9 PNEUMONIA: Status: ACTIVE | Noted: 2019-08-12

## 2019-08-12 LAB
ALBUMIN SERPL-MCNC: 3.3 G/DL (ref 3.4–5)
ANION GAP SERPL CALCULATED.3IONS-SCNC: 14 MMOL/L (ref 3–16)
BASOPHILS ABSOLUTE: 0.1 K/UL (ref 0–0.2)
BASOPHILS RELATIVE PERCENT: 0.5 %
BUN BLDV-MCNC: 58 MG/DL (ref 7–20)
CALCIUM SERPL-MCNC: 9.7 MG/DL (ref 8.3–10.6)
CHLORIDE BLD-SCNC: 90 MMOL/L (ref 99–110)
CO2: 25 MMOL/L (ref 21–32)
CREAT SERPL-MCNC: 5.5 MG/DL (ref 0.8–1.3)
EOSINOPHILS ABSOLUTE: 0 K/UL (ref 0–0.6)
EOSINOPHILS RELATIVE PERCENT: 0 %
GFR AFRICAN AMERICAN: 13
GFR NON-AFRICAN AMERICAN: 11
GLUCOSE BLD-MCNC: 131 MG/DL (ref 70–99)
GLUCOSE BLD-MCNC: 145 MG/DL (ref 70–99)
GLUCOSE BLD-MCNC: 166 MG/DL (ref 70–99)
GLUCOSE BLD-MCNC: 193 MG/DL (ref 70–99)
GLUCOSE BLD-MCNC: 222 MG/DL (ref 70–99)
HCT VFR BLD CALC: 38.9 % (ref 40.5–52.5)
HEMOGLOBIN: 12.8 G/DL (ref 13.5–17.5)
LYMPHOCYTES ABSOLUTE: 0.8 K/UL (ref 1–5.1)
LYMPHOCYTES RELATIVE PERCENT: 5.8 %
MCH RBC QN AUTO: 31.1 PG (ref 26–34)
MCHC RBC AUTO-ENTMCNC: 32.9 G/DL (ref 31–36)
MCV RBC AUTO: 94.6 FL (ref 80–100)
MONOCYTES ABSOLUTE: 0.6 K/UL (ref 0–1.3)
MONOCYTES RELATIVE PERCENT: 4.2 %
NEUTROPHILS ABSOLUTE: 11.8 K/UL (ref 1.7–7.7)
NEUTROPHILS RELATIVE PERCENT: 89.5 %
PDW BLD-RTO: 15.2 % (ref 12.4–15.4)
PERFORMED ON: ABNORMAL
PHOSPHORUS: 4.2 MG/DL (ref 2.5–4.9)
PLATELET # BLD: 233 K/UL (ref 135–450)
PMV BLD AUTO: 7.4 FL (ref 5–10.5)
POTASSIUM SERPL-SCNC: 5.3 MMOL/L (ref 3.5–5.1)
RBC # BLD: 4.11 M/UL (ref 4.2–5.9)
SODIUM BLD-SCNC: 129 MMOL/L (ref 136–145)
WBC # BLD: 13.2 K/UL (ref 4–11)

## 2019-08-12 PROCEDURE — 2500000003 HC RX 250 WO HCPCS: Performed by: INTERNAL MEDICINE

## 2019-08-12 PROCEDURE — 94640 AIRWAY INHALATION TREATMENT: CPT

## 2019-08-12 PROCEDURE — 97535 SELF CARE MNGMENT TRAINING: CPT

## 2019-08-12 PROCEDURE — 6370000000 HC RX 637 (ALT 250 FOR IP): Performed by: INTERNAL MEDICINE

## 2019-08-12 PROCEDURE — 2700000000 HC OXYGEN THERAPY PER DAY

## 2019-08-12 PROCEDURE — 80069 RENAL FUNCTION PANEL: CPT

## 2019-08-12 PROCEDURE — 97110 THERAPEUTIC EXERCISES: CPT

## 2019-08-12 PROCEDURE — 1200000000 HC SEMI PRIVATE

## 2019-08-12 PROCEDURE — 2580000003 HC RX 258: Performed by: INTERNAL MEDICINE

## 2019-08-12 PROCEDURE — 92526 ORAL FUNCTION THERAPY: CPT

## 2019-08-12 PROCEDURE — 97116 GAIT TRAINING THERAPY: CPT

## 2019-08-12 PROCEDURE — 85025 COMPLETE CBC W/AUTO DIFF WBC: CPT

## 2019-08-12 PROCEDURE — 6370000000 HC RX 637 (ALT 250 FOR IP): Performed by: NURSE PRACTITIONER

## 2019-08-12 PROCEDURE — 36415 COLL VENOUS BLD VENIPUNCTURE: CPT

## 2019-08-12 PROCEDURE — 94761 N-INVAS EAR/PLS OXIMETRY MLT: CPT

## 2019-08-12 PROCEDURE — 94669 MECHANICAL CHEST WALL OSCILL: CPT

## 2019-08-12 PROCEDURE — 96366 THER/PROPH/DIAG IV INF ADDON: CPT

## 2019-08-12 PROCEDURE — 6360000002 HC RX W HCPCS: Performed by: INTERNAL MEDICINE

## 2019-08-12 PROCEDURE — 92610 EVALUATE SWALLOWING FUNCTION: CPT

## 2019-08-12 RX ORDER — DOXYCYCLINE HYCLATE 100 MG
100 TABLET ORAL EVERY 12 HOURS SCHEDULED
Status: DISCONTINUED | OUTPATIENT
Start: 2019-08-12 | End: 2019-08-13 | Stop reason: HOSPADM

## 2019-08-12 RX ADMIN — Medication 10 ML: at 20:54

## 2019-08-12 RX ADMIN — AMIODARONE HYDROCHLORIDE 100 MG: 200 TABLET ORAL at 09:39

## 2019-08-12 RX ADMIN — IPRATROPIUM BROMIDE AND ALBUTEROL SULFATE 1 AMPULE: .5; 3 SOLUTION RESPIRATORY (INHALATION) at 19:07

## 2019-08-12 RX ADMIN — SODIUM ZIRCONIUM CYCLOSILICATE 10 G: 5 POWDER, FOR SUSPENSION ORAL at 11:09

## 2019-08-12 RX ADMIN — INSULIN LISPRO 2 UNITS: 100 INJECTION, SOLUTION INTRAVENOUS; SUBCUTANEOUS at 16:28

## 2019-08-12 RX ADMIN — DOCUSATE SODIUM 100 MG: 100 CAPSULE, LIQUID FILLED ORAL at 20:53

## 2019-08-12 RX ADMIN — INSULIN LISPRO 2 UNITS: 100 INJECTION, SOLUTION INTRAVENOUS; SUBCUTANEOUS at 20:53

## 2019-08-12 RX ADMIN — DOCUSATE SODIUM 100 MG: 100 CAPSULE, LIQUID FILLED ORAL at 09:40

## 2019-08-12 RX ADMIN — ATORVASTATIN CALCIUM 40 MG: 40 TABLET, FILM COATED ORAL at 09:38

## 2019-08-12 RX ADMIN — INSULIN LISPRO 2 UNITS: 100 INJECTION, SOLUTION INTRAVENOUS; SUBCUTANEOUS at 09:34

## 2019-08-12 RX ADMIN — ASPIRIN 162 MG: 81 TABLET, COATED ORAL at 09:37

## 2019-08-12 RX ADMIN — NEPHROCAP 1 MG: 1 CAP ORAL at 09:38

## 2019-08-12 RX ADMIN — Medication 10 ML: at 09:41

## 2019-08-12 RX ADMIN — CETIRIZINE HYDROCHLORIDE 5 MG: 5 TABLET ORAL at 09:38

## 2019-08-12 RX ADMIN — PREDNISONE 40 MG: 20 TABLET ORAL at 09:37

## 2019-08-12 RX ADMIN — DOXYCYCLINE 100 MG: 100 INJECTION, POWDER, LYOPHILIZED, FOR SOLUTION INTRAVENOUS at 05:23

## 2019-08-12 RX ADMIN — CITALOPRAM HYDROBROMIDE 40 MG: 20 TABLET ORAL at 09:38

## 2019-08-12 RX ADMIN — IPRATROPIUM BROMIDE AND ALBUTEROL SULFATE 1 AMPULE: .5; 3 SOLUTION RESPIRATORY (INHALATION) at 11:41

## 2019-08-12 RX ADMIN — HEPARIN SODIUM 5000 UNITS: 5000 INJECTION INTRAVENOUS; SUBCUTANEOUS at 20:53

## 2019-08-12 RX ADMIN — TRAZODONE HYDROCHLORIDE 150 MG: 50 TABLET ORAL at 20:53

## 2019-08-12 RX ADMIN — SEVELAMER CARBONATE 2400 MG: 800 TABLET, FILM COATED ORAL at 15:16

## 2019-08-12 RX ADMIN — SEVELAMER CARBONATE 2400 MG: 800 TABLET, FILM COATED ORAL at 20:53

## 2019-08-12 RX ADMIN — HEPARIN SODIUM 5000 UNITS: 5000 INJECTION INTRAVENOUS; SUBCUTANEOUS at 05:23

## 2019-08-12 RX ADMIN — METOPROLOL TARTRATE 50 MG: 50 TABLET ORAL at 09:41

## 2019-08-12 RX ADMIN — HEPARIN SODIUM 5000 UNITS: 5000 INJECTION INTRAVENOUS; SUBCUTANEOUS at 15:16

## 2019-08-12 RX ADMIN — METOPROLOL TARTRATE 50 MG: 50 TABLET ORAL at 20:53

## 2019-08-12 RX ADMIN — IPRATROPIUM BROMIDE AND ALBUTEROL SULFATE 1 AMPULE: .5; 3 SOLUTION RESPIRATORY (INHALATION) at 15:31

## 2019-08-12 RX ADMIN — DOXYCYCLINE HYCLATE 100 MG: 100 TABLET, COATED ORAL at 20:53

## 2019-08-12 RX ADMIN — SEVELAMER CARBONATE 2400 MG: 800 TABLET, FILM COATED ORAL at 09:37

## 2019-08-12 ASSESSMENT — PAIN DESCRIPTION - ORIENTATION
ORIENTATION: LEFT

## 2019-08-12 ASSESSMENT — PAIN DESCRIPTION - LOCATION
LOCATION: SHOULDER

## 2019-08-12 ASSESSMENT — PAIN DESCRIPTION - DESCRIPTORS
DESCRIPTORS: SHARP
DESCRIPTORS: SHARP

## 2019-08-12 ASSESSMENT — PAIN SCALES - GENERAL
PAINLEVEL_OUTOF10: 8
PAINLEVEL_OUTOF10: 5
PAINLEVEL_OUTOF10: 5
PAINLEVEL_OUTOF10: 0
PAINLEVEL_OUTOF10: 7

## 2019-08-12 ASSESSMENT — PAIN DESCRIPTION - PAIN TYPE
TYPE: ACUTE PAIN

## 2019-08-12 ASSESSMENT — PAIN DESCRIPTION - FREQUENCY
FREQUENCY: INTERMITTENT
FREQUENCY: INTERMITTENT

## 2019-08-12 NOTE — PROGRESS NOTES
Occupational Therapy  Facility/Department: [facility] C3 TELE/MED SURG/ONC  Daily Treatment Note  NAME: Marbin Duenas  : 1960  MRN: [id number]    Date of Service: 2019    Discharge Recommendations:  Subacute/Skilled Nursing Facility  OT Equipment Recommendations  Other: defer to next level of care    Assessment   Assessment: Patient easily fatigued with ADLs/transfers/mobility. Patient requires use of walker and min A for walker navigation due to weakness and endurance deficits. Patient is CGA with functional transfers with increased time due to decreased use of LUE. Patient participated in LE dressing, bathroom mobility and toileting but required min A for toileting due to fatigue and was unable to complete standing level ADLs after that due to fatigue and feeling of vomiting. B.P. as high as 165/91 but came down to 144/77 with rest.  RN aware and patient remained up in chair at end of session. O2 saturation maintained in 90s this session which was higher than last session. Prognosis: Good  OT Education: OT Role;Energy Conservation;Plan of Care;Transfer Training;ADL Adaptive Strategies; Low Vision Education  REQUIRES OT FOLLOW UP: Yes  Activity Tolerance  Activity Tolerance: Patient Tolerated treatment well;Treatment limited secondary to medical complications (free text)  Activity Tolerance: 122/63, O2 98% on 2.0 L supine at start of session, 130/74 b.p 97.5 temperature, O2 95% , 144/77 b.p at end of session O2 94%, HR 67  Safety Devices  Safety Devices in place: Yes  Type of devices: All fall risk precautions in place;Call light within reach; Chair alarm in place;Gait belt;Nurse notified; Left in chair;Patient at risk for falls         Patient Diagnosis(es): The primary encounter diagnosis was Hypoxia. Diagnoses of Acute pulmonary edema ([facility] [address] Cough, Fall, initial encounter, and Generalized weakness were also pertinent to this visit.       has a past medical history of Atrial fibrillation with RVR, [place] PER MINUTE  Level of Consciousness: Alert  Patient Currently in Pain: Yes  Orthostatic B/P and Pulse?: Yes  Oxygen Therapy  SpO2: 95 %  Patient Observation  Observations: pt works well with vpep, patient's blood pressure 165/81 after ambulation from bathroom/toileting, improved to 144/77 with rest, HR 65, O2 91%   Orientation  Orientation  Overall Orientation Status: Within Functional Limits  Objective    ADL  LE Dressing: Contact guard assistance  Toileting: Minimal assistance(due to fatigue after ambulation to bathroom, SW)  Additional Comments: wobbly in standing, requires CGA/supervision for safety        Balance  Sitting Balance: Supervision  Standing Balance: Contact guard assistance  Standing Balance  Time: 1 minute x2 for brandyn hsu  Activity: SW for support  Comment: wobbly but able to recover with wide BERTO  Functional Mobility  Functional - Mobility Device: Standard Walker  Activity: To/from bathroom  Assist Level: Minimal assistance  Functional Mobility Comments: min A for walker navigation due to patient being blind  Toilet Transfers  Toilet - Technique: Ambulating  Equipment Used: Grab bars  Toilet Transfer: Minimal assistance  Toilet Transfers Comments: increased time and min A for hand placement due to patient being blind  Bed mobility  Supine to Sit: Modified independent  Sit to Supine: Unable to assess  Comment: up in chair at end of session  Transfers  Sit to stand: Contact guard assistance  Stand to sit: Contact guard assistance  Transfer Comments: SW, increased time, cues for safety     Vision  Vision Comment: blind due to R eye cataract reported and L eye detached retina reported  Cognition  Overall Cognitive Status: Exceptions  Arousal/Alertness: Appropriate responses to stimuli  Following Commands:  Follows one step commands consistently  Attention Span: Appears intact  Memory: Appears intact  Safety Judgement: Decreased awareness of need for safety  Problem Solving: Assistance required to generate solutions  Insights: Decreased awareness of deficits  Initiation: Requires cues for some  Sequencing: Requires cues for some  Cognition Comment: cues for safety with transfers     Plan   Plan  Times per week: 3-5x/wk in acute  Current Treatment Recommendations: Strengthening, Positioning, Pain Management, Safety Education & Training, Balance Training, Patient/Caregiver Education & Training, Self-Care / ADL, Cognitive/Perceptual Training, Functional Mobility Training, Equipment Evaluation, Education, & procurement, Endurance Training    AM-PAC Score  AM-PAC Inpatient Daily Activity Raw Score: 17 (08/12/19 0907)  AM-PAC Inpatient ADL T-Scale Score : 37.26 (08/12/19 0907)  ADL Inpatient CMS 0-100% Score: 50.11 (08/12/19 0907)  ADL Inpatient CMS G-Code Modifier : CK (08/12/19 0907)    Goals  Short term goals  Time Frame for Short term goals: 1 week  Short term goal 1: Pt will complete ADL t/fs with [place] by 8/14. Short term goal 2: Pt will complete 2 ADLs in standing while maintain O2 sats in 90s. Short term goal 3: Pt will complete toileting with [place]. Short term goal 4: Pt will complete LB dressing with [place].    Patient Goals   Patient goals : get better, I know I need to get up so I can stronger, but I'm so tired     Therapy Time   Individual Concurrent Group Co-treatment   Time In 0754         Time Out 0856         Minutes 62         Timed Code Treatment Minutes: 1125 [facility], OTR/L

## 2019-08-12 NOTE — PROGRESS NOTES
function, identify signs and symptoms of aspiration and make recommendations regarding safe dietary consistencies, effective compensatory strategies, and safe eating environment. Impression  Dysphagia Diagnosis: Mild oral stage dysphagia;Mild pharyngeal stage dysphagia  Dysphagia Outcome Severity Scale: Level 5: Mild dysphagia- Distant supervision. May need one diet consistency restricted     Dysphagia Impression : Pt seen this date for BSE. RN ok'd entry of SLP. Pt currently NPO d/t choking episode. Pt was admitted to the hospital following a fall. Per MD, pt with presumed pneumonia, pulmonary edema, and COPD exacerbation. Pt currently on 2L of O2. Pt denied any difficulty with swallowing with the exception of recent choking episode on pancakes. Pt reported it \"hurts\" his chest to cough. Oral motor exam was WNL. Adequate lingual, labial, and jaw strength and movement. MIldly reduced laryngeal elevation upon palpation. Strong cough and vocal quality. Pt was given trials of ice chips, thin liquids via tsp and cup, puree, and hard solids. MIldly reduced rate of mastication likely d/t lack of dentition. Mildly reduced laryngeal elevation and delay in the trigger of the swallow. No overt clinical s/s of aspiration with any consistency or trial given this date. Clear vocal quality following all trials. SLP recommends IDDSI Level 6: Soft & Bite-Sized diet with thin liquids w/ strict aspiration precautions. Meds as tolerated with thin liquids or bite of puree. SLP to follow for diet tolerance monitoring. Treatment Plan  Requires SLP Intervention: Yes  Duration/Frequency of Treatment: 3-5x/week   D/C Recommendations: To be determined       Recommended Diet and Intervention  Diet Solids Recommendation: Dysphagia Soft and Bite-Sized (Dysphagia III)  Liquid Consistency Recommendation:  Thin  Recommended Form of Meds: PO  Recommendations: Dysphagia treatment  Therapeutic Interventions: Diet tolerance monitoring;Patient/Family education    Compensatory Swallowing Strategies  Compensatory Swallowing Strategies: Alternate solids and liquids;Small bites/sips;Eat/Feed slowly; Remain upright for 30-45 minutes after meals;Upright as possible for all oral intake;Assist feed    Treatment/Goals  Short-term Goals  Timeframe for Short-term Goals: 5 days (8/17/19)  1. The patient/caregiver will demonstrate understanding of compensatory strategies for improved swallowing safety. 2. The patient will tolerate mechanical soft foods 10/10.  3. The patient will tolerate thin liquids without signs and symptoms of aspiration 10/10 via straw. Long-term Goals  Timeframe for Long-term Goals: 7 days (8/19/19)  1. The patient will tolerate recommended diet without observed clinical signs of aspiration. General  Chart Reviewed: Yes  Comments: \"I choked two times\"   Subjective  Subjective: Pt awake and alert, seated upright on edge of bed. OT present in room setting up chair for transfer. Behavior/Cognition: Alert; Cooperative;Pleasant mood  Respiratory Status: O2 via nasual cannula  Breath Sounds: Clear  O2 Device: Nasal cannula  Liters of Oxygen: 2 L  Communication Observation: Functional  Follows Directions: Simple  Dentition: Edentulous; Some missing teeth  Patient Positioning: Upright in bed  Baseline Vocal Quality: Normal  Volitional Cough: Strong  Prior Dysphagia History: Pt had SLP services in 2016 and a regular diet with thin liquids was recommended. Pt reported consuming a regular diet at home. Consistencies Administered: Reg solid; Dysphagia Pureed (Dysphagia I); Thin - cup; Thin - straw; Ice Chips       Vision/Hearing  Vision  Vision: Impaired  Vision Exceptions: Legally blind  Hearing  Hearing: Within functional limits    Oral Motor Deficits  Oral/Motor  Oral Motor:  Within functional limits    Oral Phase Dysfunction  Oral Phase  Oral Phase: Exceptions  Oral Phase Dysfunction  Impaired Mastication: Reg Solid  Oral

## 2019-08-13 VITALS
RESPIRATION RATE: 21 BRPM | DIASTOLIC BLOOD PRESSURE: 65 MMHG | BODY MASS INDEX: 30.86 KG/M2 | TEMPERATURE: 97.4 F | HEIGHT: 71 IN | HEART RATE: 62 BPM | SYSTOLIC BLOOD PRESSURE: 115 MMHG | OXYGEN SATURATION: 97 % | WEIGHT: 220.46 LBS

## 2019-08-13 LAB
ALBUMIN SERPL-MCNC: 3.1 G/DL (ref 3.4–5)
ANION GAP SERPL CALCULATED.3IONS-SCNC: 17 MMOL/L (ref 3–16)
BASOPHILS ABSOLUTE: 0 K/UL (ref 0–0.2)
BASOPHILS RELATIVE PERCENT: 0.1 %
BLOOD CULTURE, ROUTINE: NORMAL
BUN BLDV-MCNC: 85 MG/DL (ref 7–20)
CALCIUM SERPL-MCNC: 9.4 MG/DL (ref 8.3–10.6)
CHLORIDE BLD-SCNC: 85 MMOL/L (ref 99–110)
CO2: 24 MMOL/L (ref 21–32)
CREAT SERPL-MCNC: 7.3 MG/DL (ref 0.8–1.3)
CULTURE, BLOOD 2: NORMAL
EOSINOPHILS ABSOLUTE: 0 K/UL (ref 0–0.6)
EOSINOPHILS RELATIVE PERCENT: 0 %
GFR AFRICAN AMERICAN: 9
GFR NON-AFRICAN AMERICAN: 8
GLUCOSE BLD-MCNC: 124 MG/DL (ref 70–99)
GLUCOSE BLD-MCNC: 186 MG/DL (ref 70–99)
GLUCOSE BLD-MCNC: 189 MG/DL (ref 70–99)
GLUCOSE BLD-MCNC: 198 MG/DL (ref 70–99)
HCT VFR BLD CALC: 39.5 % (ref 40.5–52.5)
HEMOGLOBIN: 13 G/DL (ref 13.5–17.5)
LYMPHOCYTES ABSOLUTE: 0.8 K/UL (ref 1–5.1)
LYMPHOCYTES RELATIVE PERCENT: 7 %
MCH RBC QN AUTO: 31 PG (ref 26–34)
MCHC RBC AUTO-ENTMCNC: 32.9 G/DL (ref 31–36)
MCV RBC AUTO: 94.5 FL (ref 80–100)
MONOCYTES ABSOLUTE: 0.6 K/UL (ref 0–1.3)
MONOCYTES RELATIVE PERCENT: 4.9 %
NEUTROPHILS ABSOLUTE: 9.9 K/UL (ref 1.7–7.7)
NEUTROPHILS RELATIVE PERCENT: 88 %
PDW BLD-RTO: 15 % (ref 12.4–15.4)
PERFORMED ON: ABNORMAL
PHOSPHORUS: 4.4 MG/DL (ref 2.5–4.9)
PLATELET # BLD: 224 K/UL (ref 135–450)
PMV BLD AUTO: 7.8 FL (ref 5–10.5)
POTASSIUM SERPL-SCNC: 5.5 MMOL/L (ref 3.5–5.1)
RBC # BLD: 4.18 M/UL (ref 4.2–5.9)
SODIUM BLD-SCNC: 126 MMOL/L (ref 136–145)
WBC # BLD: 11.3 K/UL (ref 4–11)

## 2019-08-13 PROCEDURE — 6370000000 HC RX 637 (ALT 250 FOR IP): Performed by: INTERNAL MEDICINE

## 2019-08-13 PROCEDURE — 36415 COLL VENOUS BLD VENIPUNCTURE: CPT

## 2019-08-13 PROCEDURE — 90935 HEMODIALYSIS ONE EVALUATION: CPT

## 2019-08-13 PROCEDURE — 6370000000 HC RX 637 (ALT 250 FOR IP): Performed by: NURSE PRACTITIONER

## 2019-08-13 PROCEDURE — 80069 RENAL FUNCTION PANEL: CPT

## 2019-08-13 PROCEDURE — 6360000002 HC RX W HCPCS: Performed by: INTERNAL MEDICINE

## 2019-08-13 PROCEDURE — 2500000003 HC RX 250 WO HCPCS

## 2019-08-13 PROCEDURE — 94669 MECHANICAL CHEST WALL OSCILL: CPT

## 2019-08-13 PROCEDURE — 2580000003 HC RX 258: Performed by: INTERNAL MEDICINE

## 2019-08-13 PROCEDURE — 94640 AIRWAY INHALATION TREATMENT: CPT

## 2019-08-13 PROCEDURE — 94761 N-INVAS EAR/PLS OXIMETRY MLT: CPT

## 2019-08-13 PROCEDURE — 2700000000 HC OXYGEN THERAPY PER DAY

## 2019-08-13 PROCEDURE — 85025 COMPLETE CBC W/AUTO DIFF WBC: CPT

## 2019-08-13 RX ORDER — DOXYCYCLINE HYCLATE 100 MG
100 TABLET ORAL EVERY 12 HOURS SCHEDULED
Qty: 20 TABLET | Refills: 0 | Status: SHIPPED | OUTPATIENT
Start: 2019-08-13 | End: 2019-08-23

## 2019-08-13 RX ORDER — LIDOCAINE HYDROCHLORIDE 10 MG/ML
INJECTION, SOLUTION EPIDURAL; INFILTRATION; INTRACAUDAL; PERINEURAL
Status: COMPLETED
Start: 2019-08-13 | End: 2019-08-13

## 2019-08-13 RX ORDER — PREDNISONE 10 MG/1
TABLET ORAL
Qty: 30 TABLET | Refills: 0
Start: 2019-08-13 | End: 2019-11-29

## 2019-08-13 RX ORDER — LIDOCAINE HYDROCHLORIDE 10 MG/ML
0.4 INJECTION, SOLUTION EPIDURAL; INFILTRATION; INTRACAUDAL; PERINEURAL PRN
Status: DISCONTINUED | OUTPATIENT
Start: 2019-08-13 | End: 2019-08-13 | Stop reason: HOSPADM

## 2019-08-13 RX ORDER — GUAIFENESIN/DEXTROMETHORPHAN 100-10MG/5
5 SYRUP ORAL 4 TIMES DAILY PRN
Qty: 240 ML | Refills: 0 | Status: SHIPPED | OUTPATIENT
Start: 2019-08-13 | End: 2019-08-25

## 2019-08-13 RX ADMIN — INSULIN LISPRO 2 UNITS: 100 INJECTION, SOLUTION INTRAVENOUS; SUBCUTANEOUS at 17:07

## 2019-08-13 RX ADMIN — CITALOPRAM HYDROBROMIDE 40 MG: 20 TABLET ORAL at 14:49

## 2019-08-13 RX ADMIN — IPRATROPIUM BROMIDE AND ALBUTEROL SULFATE 1 AMPULE: .5; 3 SOLUTION RESPIRATORY (INHALATION) at 15:42

## 2019-08-13 RX ADMIN — HEPARIN SODIUM 5000 UNITS: 5000 INJECTION INTRAVENOUS; SUBCUTANEOUS at 06:26

## 2019-08-13 RX ADMIN — DOXYCYCLINE HYCLATE 100 MG: 100 TABLET, COATED ORAL at 14:47

## 2019-08-13 RX ADMIN — METOPROLOL TARTRATE 50 MG: 50 TABLET ORAL at 14:47

## 2019-08-13 RX ADMIN — Medication 10 ML: at 13:31

## 2019-08-13 RX ADMIN — PREDNISONE 40 MG: 20 TABLET ORAL at 14:57

## 2019-08-13 RX ADMIN — ATORVASTATIN CALCIUM 40 MG: 40 TABLET, FILM COATED ORAL at 14:53

## 2019-08-13 RX ADMIN — SEVELAMER CARBONATE 2400 MG: 800 TABLET, FILM COATED ORAL at 14:48

## 2019-08-13 RX ADMIN — NEPHROCAP 1 MG: 1 CAP ORAL at 14:47

## 2019-08-13 RX ADMIN — IPRATROPIUM BROMIDE AND ALBUTEROL SULFATE 1 AMPULE: .5; 3 SOLUTION RESPIRATORY (INHALATION) at 07:31

## 2019-08-13 RX ADMIN — INSULIN LISPRO 2 UNITS: 100 INJECTION, SOLUTION INTRAVENOUS; SUBCUTANEOUS at 08:48

## 2019-08-13 RX ADMIN — HEPARIN SODIUM 2500 UNITS: 1000 INJECTION INTRAVENOUS; SUBCUTANEOUS at 08:48

## 2019-08-13 RX ADMIN — DOCUSATE SODIUM 100 MG: 100 CAPSULE, LIQUID FILLED ORAL at 14:49

## 2019-08-13 RX ADMIN — HEPARIN SODIUM 5000 UNITS: 5000 INJECTION INTRAVENOUS; SUBCUTANEOUS at 14:57

## 2019-08-13 RX ADMIN — CETIRIZINE HYDROCHLORIDE 5 MG: 5 TABLET ORAL at 14:47

## 2019-08-13 RX ADMIN — AMIODARONE HYDROCHLORIDE 100 MG: 200 TABLET ORAL at 14:49

## 2019-08-13 RX ADMIN — ASPIRIN 162 MG: 81 TABLET, COATED ORAL at 14:47

## 2019-08-13 RX ADMIN — LIDOCAINE HYDROCHLORIDE 2 ML: 10 INJECTION, SOLUTION EPIDURAL; INFILTRATION; INTRACAUDAL; PERINEURAL at 08:37

## 2019-08-13 ASSESSMENT — PAIN SCALES - GENERAL
PAINLEVEL_OUTOF10: 2
PAINLEVEL_OUTOF10: 0

## 2019-08-13 NOTE — PROGRESS NOTES
Pt back from dialysis. Alert and orientated. Call light within reach. Will continue to monitor. Antonieta

## 2019-08-13 NOTE — PROGRESS NOTES
Pt a/o. VSS. 2L NC per baseline. Shift assessment complete and charted. Pt denies needs at this time. Bed locked and in lowest position. Call light and bedside table within reach. Will continue to monitor.

## 2019-08-13 NOTE — PROGRESS NOTES
Units Subcutaneous 3 times per day    ipratropium-albuterol  1 ampule Inhalation Q4H WA    insulin lispro  0-12 Units Subcutaneous TID WC    insulin lispro  0-6 Units Subcutaneous Nightly     PRN Meds: lidocaine PF, acetaminophen, benzocaine-menthol, guaiFENesin-dextromethorphan, sodium chloride flush, magnesium hydroxide, ondansetron, glucose, dextrose, glucagon (rDNA), dextrose, heparin (porcine)      Intake/Output Summary (Last 24 hours) at 8/13/2019 0909  Last data filed at 8/13/2019 0536  Gross per 24 hour   Intake 771 ml   Output --   Net 771 ml       Physical Exam Performed:    /71   Pulse 62   Temp 97.3 °F (36.3 °C) (Oral)   Resp 18   Ht 5' 10.5\" (1.791 m)   Wt 211 lb 3.2 oz (95.8 kg)   SpO2 98%   BMI 29.88 kg/m²     General appearance: No apparent distress, appears stated age and cooperative. HEENT: Pupils equal, round, and reactive to light. Conjunctivae/corneas clear. Chronic bilateral blindness  Neck: Supple, with full range of motion. No jugular venous distention. Trachea midline. Respiratory:  Normal respiratory effort. Decreased bilaterally currently on 2 L per nasal cannula  Cardiovascular: Regular rate and rhythm with normal S1/S2 without murmurs, rubs or gallops. Abdomen: Soft, non-tender, non-distended with normal bowel sounds. Musculoskeletal: No clubbing, cyanosis or edema bilaterally. Full range of motion without deformity. Skin: Skin color, texture, turgor normal.  No rashes or lesions. Neurologic:  Neurovascularly intact without any focal sensory/motor deficits.  Cranial nerves: II-XII intact, grossly non-focal.  Psychiatric: Alert and oriented, thought content appropriate, normal insight  Capillary Refill: Brisk,< 3 seconds   Peripheral Pulses: +2 palpable, equal bilaterally       Labs:   Recent Labs     08/11/19  0536 08/12/19  0549 08/13/19  0530   WBC 15.1* 13.2* 11.3*   HGB 13.3* 12.8* 13.0*   HCT 40.7 38.9* 39.5*    233 224     Recent Labs effusions and atelectasis. Assessment/Plan:    Active Hospital Problems    Diagnosis    Pneumonia [J18.9]    SOB (shortness of breath) [R06.02]     Acute Respiratory failure with hypoxia 2/2 to Pulmonary edema and fluid overload in the setting of ESRD requiring HD, chronic diastolic failure. and COPDE, presumed PNA   - supplemental O2 to keep sats > 88%  - Nephro consult for HD/fluid management   - 8/9 started on Rocephin and Doxy, repeat CXR - unchanged however productive cough and SOB have improved, low suspicion that this was PNA, change to PO doxy fo COPDE management.  Cont IHBD and steroid taper   - Pulm toilet   - SLP consulted, notes reviewed- asp precautions     ESRD-   - Neprho following, HD     HTN- controlled     DM- controlled  - SSI, FSBS   - suspect steroids will elevated glucose     CAD-   - controlled, continue asa, BB, and statin   - 1/2019 stress w/o ischemia     PAF - s/p watchman 2/2018  - continue amio and BB, fistula bleed on warfarin    Fall/Generalized weakness and debility   - PT/OT       DVT Prophylaxis: SQ heparin   Diet: DIET GENERAL; Dysphagia Soft and Bite-Sized; Daily Fluid Restriction: 1000 ml; Low Potassium  Code Status: Full Code    PT/OT Eval Status: E/T   Rec's for SNF   CM consulted for Discharge needs     Dispo -would benefit from skilled nursing facility secondary to blindness and debility Case management working on placement    LASHAUN Arriaga - CNP

## 2019-08-13 NOTE — DISCHARGE INSTR - COC
 Influenza, Quadv, 6 mo and older, IM, PF (Flulaval, Fluarix) 09/17/2018    Pneumococcal Conjugate 7-valent (Lavaun Sixto) 02/09/2015       Active Problems:  Patient Active Problem List   Diagnosis Code    Recurrent right pleural effusion J90    HLD (hyperlipidemia) E78.5    Blindness of left eye with low vision in contralateral eye H54.40, H54.50    History of anemia due to CKD N18.9, Z86.2    Bilateral LE lymphedema and venous stasis dermatitis I87.2    PAF (paroxysmal atrial fibrillation) ([facility]) I48.0    ESRD on hemodialysis ([facility]) N18.6, Z99.2    DM2 (diabetes mellitus, type 2) ([facility]) E11.9    Chronic diastolic (congestive) heart failure ([facility]) I50.32    Typical atrial flutter ([facility]) I48.3    Suspected sleep apnea R29.818    Renovascular hypertension I15.0    Rosario's esophagus K22.70    CAD (coronary artery disease) I25.10    Chronic hypoxemic respiratory failure on 3 L home O2 J96.11    Depression F32.9    SIRS (systemic inflammatory response syndrome) ([facility]) R65.10    Former smoker Z87.891    Hypertensive urgency I16.0    Pulmonary edema J81.1    COPD exacerbation ([facility]) J44.1    CHF (congestive heart failure) ([facility]) I50.9    Splenic calcification D73.89    Tricuspid regurgitation I07.1    Leukocytosis D72.829    Hyperglycemia R73.9    Chest pain R07.9    Essential hypertension I10    SOB (shortness of breath) R06.02    Pneumonia J18.9       Isolation/Infection:   Isolation          No Isolation            Nurse Assessment:  Last Vital Signs: /65   Pulse 62   Temp 97.4 °F (36.3 °C) (Oral)   Resp 20   Ht 5' 10.5\" (1.791 m)   Wt 220 lb 7.4 oz (100 kg)   SpO2 97%   BMI 31.19 kg/m²     Last documented pain score (0-10 scale): Pain Level: 0  Last Weight:   Wt Readings from Last 1 Encounters:   08/13/19 220 lb 7.4 oz (100 kg)     Mental Status:  oriented    IV Access:  - None    Nursing Mobility/ADLs:  Walking   Assisted  Transfer  Assisted  Bathing  Assisted  Dressing at 3:43 PM    PHYSICIAN SECTION    Prognosis: Fair    Condition at Discharge: Stable    Rehab Potential (if transferring to Rehab): Fair    Recommended Labs or Other Treatments After Discharge:   Recommended Follow-up, Labs or Other Treatments After Discharge:    SN. PT/OT    HD Three times a week     O2 wean as tolerated   Finger sticks AC and HS               Physician Certification: I certify the above information and transfer of Hi Greco  is necessary for the continuing treatment of the diagnosis listed and that he requires [facility] for less 30 days.      Update Admission H&P: No change in H&P    PHYSICIAN SIGNATURE:  Electronically signed by LASHAUN Jacob CNP on 8/13/19 at 3:23 PM

## 2019-08-13 NOTE — PLAN OF CARE
Pt a/o. Bed locked and in lowest position. Pt verbalized understanding of calling for assistance. Bedside table and call light within reach. Will continue to monitor.

## 2019-08-26 ENCOUNTER — HOSPITAL ENCOUNTER (OUTPATIENT)
Dept: GENERAL RADIOLOGY | Age: 59
Discharge: HOME | End: 2019-08-26
Payer: COMMERCIAL

## 2019-08-26 DIAGNOSIS — R13.12 DYSPHAGIA, OROPHARYNGEAL: ICD-10-CM

## 2019-08-26 PROCEDURE — 74230 X-RAY XM SWLNG FUNCJ C+: CPT

## 2019-09-02 ENCOUNTER — HOSPITAL ENCOUNTER (EMERGENCY)
Age: 59
Discharge: HOME | End: 2019-09-03
Attending: EMERGENCY MEDICINE
Payer: COMMERCIAL

## 2019-09-02 ENCOUNTER — APPOINTMENT (OUTPATIENT)
Dept: GENERAL RADIOLOGY | Age: 59
End: 2019-09-02
Payer: COMMERCIAL

## 2019-09-02 DIAGNOSIS — Z87.09 HISTORY OF COPD: ICD-10-CM

## 2019-09-02 DIAGNOSIS — Z99.2 END STAGE RENAL DISEASE ON DIALYSIS (HCC): ICD-10-CM

## 2019-09-02 DIAGNOSIS — N18.6 END STAGE RENAL DISEASE ON DIALYSIS (HCC): ICD-10-CM

## 2019-09-02 DIAGNOSIS — E87.5 HYPERKALEMIA: Primary | ICD-10-CM

## 2019-09-02 LAB
A/G RATIO: 0.8 (ref 1.1–2.2)
ALBUMIN SERPL-MCNC: 3.2 G/DL (ref 3.4–5)
ALP BLD-CCNC: 93 U/L (ref 40–129)
ALT SERPL-CCNC: 12 U/L (ref 10–40)
ANION GAP SERPL CALCULATED.3IONS-SCNC: 13 MMOL/L (ref 3–16)
AST SERPL-CCNC: 14 U/L (ref 15–37)
BASOPHILS ABSOLUTE: 0.1 K/UL (ref 0–0.2)
BASOPHILS RELATIVE PERCENT: 0.7 %
BILIRUB SERPL-MCNC: <0.2 MG/DL (ref 0–1)
BUN BLDV-MCNC: 66 MG/DL (ref 7–20)
CALCIUM SERPL-MCNC: 9.5 MG/DL (ref 8.3–10.6)
CHLORIDE BLD-SCNC: 94 MMOL/L (ref 99–110)
CO2: 27 MMOL/L (ref 21–32)
CREAT SERPL-MCNC: 8.4 MG/DL (ref 0.8–1.3)
EOSINOPHILS ABSOLUTE: 0.4 K/UL (ref 0–0.6)
EOSINOPHILS RELATIVE PERCENT: 5.4 %
GFR AFRICAN AMERICAN: 8
GFR NON-AFRICAN AMERICAN: 7
GLOBULIN: 3.9 G/DL
GLUCOSE BLD-MCNC: 105 MG/DL (ref 70–99)
HCT VFR BLD CALC: 35.4 % (ref 40.5–52.5)
HEMOGLOBIN: 11.5 G/DL (ref 13.5–17.5)
LYMPHOCYTES ABSOLUTE: 0.5 K/UL (ref 1–5.1)
LYMPHOCYTES RELATIVE PERCENT: 6.8 %
MCH RBC QN AUTO: 31 PG (ref 26–34)
MCHC RBC AUTO-ENTMCNC: 32.4 G/DL (ref 31–36)
MCV RBC AUTO: 95.5 FL (ref 80–100)
MONOCYTES ABSOLUTE: 0.4 K/UL (ref 0–1.3)
MONOCYTES RELATIVE PERCENT: 5.3 %
NEUTROPHILS ABSOLUTE: 6.3 K/UL (ref 1.7–7.7)
NEUTROPHILS RELATIVE PERCENT: 81.8 %
PDW BLD-RTO: 15.2 % (ref 12.4–15.4)
PLATELET # BLD: 186 K/UL (ref 135–450)
PMV BLD AUTO: 7.7 FL (ref 5–10.5)
POTASSIUM REFLEX MAGNESIUM: 6.8 MMOL/L (ref 3.5–5.1)
PRO-BNP: ABNORMAL PG/ML (ref 0–124)
RBC # BLD: 3.71 M/UL (ref 4.2–5.9)
REASON FOR REJECTION: NORMAL
REJECTED TEST: NORMAL
SODIUM BLD-SCNC: 134 MMOL/L (ref 136–145)
TOTAL PROTEIN: 7.1 G/DL (ref 6.4–8.2)
TROPONIN: 0.05 NG/ML
WBC # BLD: 7.7 K/UL (ref 4–11)

## 2019-09-02 PROCEDURE — 94761 N-INVAS EAR/PLS OXIMETRY MLT: CPT

## 2019-09-02 PROCEDURE — 84484 ASSAY OF TROPONIN QUANT: CPT

## 2019-09-02 PROCEDURE — 6360000002 HC RX W HCPCS: Performed by: NURSE PRACTITIONER

## 2019-09-02 PROCEDURE — 94640 AIRWAY INHALATION TREATMENT: CPT

## 2019-09-02 PROCEDURE — 99285 EMERGENCY DEPT VISIT HI MDM: CPT

## 2019-09-02 PROCEDURE — 71046 X-RAY EXAM CHEST 2 VIEWS: CPT

## 2019-09-02 PROCEDURE — 2700000000 HC OXYGEN THERAPY PER DAY

## 2019-09-02 PROCEDURE — 6370000000 HC RX 637 (ALT 250 FOR IP): Performed by: NURSE PRACTITIONER

## 2019-09-02 PROCEDURE — 93005 ELECTROCARDIOGRAM TRACING: CPT | Performed by: EMERGENCY MEDICINE

## 2019-09-02 PROCEDURE — 96374 THER/PROPH/DIAG INJ IV PUSH: CPT

## 2019-09-02 PROCEDURE — 85025 COMPLETE CBC W/AUTO DIFF WBC: CPT

## 2019-09-02 PROCEDURE — 80053 COMPREHEN METABOLIC PANEL: CPT

## 2019-09-02 PROCEDURE — 83880 ASSAY OF NATRIURETIC PEPTIDE: CPT

## 2019-09-02 RX ORDER — CALCIUM GLUCONATE 94 MG/ML
1 INJECTION, SOLUTION INTRAVENOUS ONCE
Status: COMPLETED | OUTPATIENT
Start: 2019-09-03 | End: 2019-09-03

## 2019-09-02 RX ORDER — DEXTROSE MONOHYDRATE 25 G/50ML
25 INJECTION, SOLUTION INTRAVENOUS ONCE
Status: COMPLETED | OUTPATIENT
Start: 2019-09-03 | End: 2019-09-03

## 2019-09-02 RX ORDER — METHYLPREDNISOLONE SODIUM SUCCINATE 125 MG/2ML
80 INJECTION, POWDER, LYOPHILIZED, FOR SOLUTION INTRAMUSCULAR; INTRAVENOUS ONCE
Status: COMPLETED | OUTPATIENT
Start: 2019-09-02 | End: 2019-09-02

## 2019-09-02 RX ORDER — IPRATROPIUM BROMIDE AND ALBUTEROL SULFATE 2.5; .5 MG/3ML; MG/3ML
1 SOLUTION RESPIRATORY (INHALATION) ONCE
Status: COMPLETED | OUTPATIENT
Start: 2019-09-02 | End: 2019-09-02

## 2019-09-02 RX ADMIN — IPRATROPIUM BROMIDE AND ALBUTEROL SULFATE 1 AMPULE: .5; 3 SOLUTION RESPIRATORY (INHALATION) at 22:47

## 2019-09-02 RX ADMIN — METHYLPREDNISOLONE SODIUM SUCCINATE 80 MG: 125 INJECTION, POWDER, FOR SOLUTION INTRAMUSCULAR; INTRAVENOUS at 22:48

## 2019-09-02 ASSESSMENT — ENCOUNTER SYMPTOMS
CHEST TIGHTNESS: 1
VOMITING: 0
WHEEZING: 0
COLOR CHANGE: 0
SHORTNESS OF BREATH: 1
BACK PAIN: 0
DIARRHEA: 0
NAUSEA: 0
ABDOMINAL PAIN: 0
COUGH: 1

## 2019-09-03 VITALS
TEMPERATURE: 98 F | WEIGHT: 220 LBS | OXYGEN SATURATION: 96 % | DIASTOLIC BLOOD PRESSURE: 70 MMHG | HEART RATE: 80 BPM | RESPIRATION RATE: 13 BRPM | SYSTOLIC BLOOD PRESSURE: 156 MMHG | BODY MASS INDEX: 31.12 KG/M2

## 2019-09-03 LAB
EKG ATRIAL RATE: 62 BPM
EKG DIAGNOSIS: NORMAL
EKG P AXIS: 14 DEGREES
EKG P-R INTERVAL: 146 MS
EKG Q-T INTERVAL: 426 MS
EKG QRS DURATION: 88 MS
EKG QTC CALCULATION (BAZETT): 432 MS
EKG R AXIS: 13 DEGREES
EKG T AXIS: 21 DEGREES
EKG VENTRICULAR RATE: 62 BPM
GLUCOSE BLD-MCNC: 81 MG/DL
GLUCOSE BLD-MCNC: 81 MG/DL (ref 70–99)
PERFORMED ON: NORMAL
POTASSIUM SERPL-SCNC: 6.6 MMOL/L (ref 3.5–5.1)

## 2019-09-03 PROCEDURE — 6370000000 HC RX 637 (ALT 250 FOR IP): Performed by: NURSE PRACTITIONER

## 2019-09-03 PROCEDURE — 93010 ELECTROCARDIOGRAM REPORT: CPT | Performed by: INTERNAL MEDICINE

## 2019-09-03 PROCEDURE — 6360000002 HC RX W HCPCS: Performed by: NURSE PRACTITIONER

## 2019-09-03 PROCEDURE — 94640 AIRWAY INHALATION TREATMENT: CPT

## 2019-09-03 PROCEDURE — 2580000003 HC RX 258: Performed by: NURSE PRACTITIONER

## 2019-09-03 PROCEDURE — 96375 TX/PRO/DX INJ NEW DRUG ADDON: CPT

## 2019-09-03 PROCEDURE — 2500000003 HC RX 250 WO HCPCS: Performed by: EMERGENCY MEDICINE

## 2019-09-03 PROCEDURE — 84132 ASSAY OF SERUM POTASSIUM: CPT

## 2019-09-03 PROCEDURE — 6360000002 HC RX W HCPCS: Performed by: EMERGENCY MEDICINE

## 2019-09-03 RX ORDER — ALBUTEROL SULFATE 2.5 MG/3ML
5 SOLUTION RESPIRATORY (INHALATION) ONCE
Status: COMPLETED | OUTPATIENT
Start: 2019-09-03 | End: 2019-09-03

## 2019-09-03 RX ADMIN — ALBUTEROL SULFATE 5 MG: 2.5 SOLUTION RESPIRATORY (INHALATION) at 02:49

## 2019-09-03 RX ADMIN — INSULIN HUMAN 10 UNITS: 100 INJECTION, SOLUTION PARENTERAL at 00:04

## 2019-09-03 RX ADMIN — DEXTROSE MONOHYDRATE 25 G: 500 INJECTION PARENTERAL at 00:05

## 2019-09-03 RX ADMIN — SODIUM BICARBONATE 50 MEQ: 84 INJECTION, SOLUTION INTRAVENOUS at 02:44

## 2019-09-03 RX ADMIN — CALCIUM GLUCONATE 1 G: 98 INJECTION, SOLUTION INTRAVENOUS at 00:23

## 2019-09-03 NOTE — ED PROVIDER NOTES
Right Ear: External ear normal.   Left Ear: External ear normal.   Mouth/Throat: Oropharynx is clear and moist.   Eyes: Right eye exhibits no discharge. Left eye exhibits no discharge. No scleral icterus. Neck: Normal range of motion. Neck supple. Cardiovascular:   Normal S1 and 2, peripheral pulses are 2+ with no peripheral edema. Right arm fistula for dialysis has normal bruit and thrill   Pulmonary/Chest: Effort normal. No respiratory distress. Airway patent with symmetric rise and fall of chest, lungs are clear anteriorly, diminished posteriorly in the bases however he is not tachypneic or dyspneic and saturations are 94% on 4 L per nasal cannula oxygen. Abdominal: Soft. Bowel sounds are normal. There is no tenderness. Abdomen is protuberant but soft nondistended   Musculoskeletal: Normal range of motion. Neurological: He is alert and oriented to person, place, and time. Skin: Skin is warm. Capillary refill takes 2 to 3 seconds. He is not diaphoretic. No pallor. Psychiatric: He has a normal mood and affect. His behavior is normal.   Nursing note and vitals reviewed.       DIAGNOSTIC RESULTS   LABS:    Labs Reviewed   CBC WITH AUTO DIFFERENTIAL - Abnormal; Notable for the following components:       Result Value    RBC 3.71 (*)     Hemoglobin 11.5 (*)     Hematocrit 35.4 (*)     Lymphocytes Absolute 0.5 (*)     All other components within normal limits    Narrative:     Performed at:  [facility]) [address],  [place], [address]   Phone [phone] - Abnormal; Notable for the following components:    Pro-BNP 15,324 (*)     All other components within normal limits    Narrative:     Mariia Sosa tel. [phone],  Chemistry results called to and read back by Bessie Almaguer RN, 09/02/2019 23:51,  by [facility]  Performed at:  [facility]  [address],  [place], [address]   Phone [phone] TROPONIN - Abnormal; Notable for the following components:    Troponin 0.05 (*)     All other components within normal limits    Narrative:     Chris Morataya 3701. [phone],  Chemistry results called to and read back by Nellie Maynard RN, 09/02/2019 23:51,  by [facility]  Performed at:  [place] [address] [address]   Phone [phone]   COMPREHENSIVE METABOLIC PANEL W/ REFLEX TO MG FOR LOW K - Abnormal; Notable for the following components:    Sodium 134 (*)     Potassium reflex Magnesium 6.8 (*)     Chloride 94 (*)     Glucose 105 (*)     BUN 66 (*)     CREATININE 8.4 (*)     GFR Non- 7 (*)     GFR  8 (*)     Alb 3.2 (*)     Albumin/Globulin Ratio 0.8 (*)     AST 14 (*)     All other components within normal limits    Narrative:     Gris Feeling tel. [phone],  Chemistry results called to and read back by Nellie Maynard RN, 09/02/2019 23:51,  by [facility]  Performed at:  [place] [address] [address]   Phone [phone]   POTASSIUM - Abnormal; Notable for the following components:    Potassium 6.6 (*)     All other components within normal limits    Narrative:     Gris Feeling tel. [phone],  Chemistry results called to and read back by Nellie Maynard RN, 09/03/2019 02:13,  by [facility]  Performed at:  [place] [address] [address]   Phone [phone]   POCT GLUCOSE - Normal   SPECIMEN REJECTION    Narrative:     Gris Feeling tel. [phone],  Rejected Test Name/Called to: bnpep trop cmpx/Kiley Bui RN, 09/02/2019  23:17, by [facility]  Performed at:  [place] [address] [address]   Phone [phone]   POCT GLUCOSE    Narrative:     Performed at:  [place] [address] [address]   Phone [phone]       All

## 2019-09-03 NOTE — ED NOTES
Patient sleeping at this time.  Updated [facility] of plan to discharge patient     Jose Mason[facility]  09/03/19 9558

## 2019-09-03 NOTE — ED PROVIDER NOTES
I personally interviewed and examined this patient, discussed the findings, diagnostic studies, interventions and treatment plan with SARATH. I reviewed the clinical notes and test results. I personally supervised all pertinent procedures performed on the patient by the SARATH throughout the course and was available to manage complications as they arose, if applicable. I agree with the assessment, management and disposition as presented by the SARATH with exceptions/corrections as documented. Pt with chest pressure and so after eating a few hours ago. ESRD HD T TH SAT, felt like he could not breathe when he was sleeping tonight. Trop here is at his baseline. He is feeling better no dyspnea no wheezing or sob. No dizziness. No numbness or tingling. Lungs clear and heart RRR w/o murmur or gallop or arrhythmia. EKG shows NSR and not much changed from prior, intervals  and segments unremarkable. Annamary Ripa He would like to go home and I think he is stable for dc to home and f/u with pcp and cards. For further details of this emergency department encounter, please see the SARATH's documentation.       ED Triage Vitals [09/02/19 2230]   BP Temp Temp Source Pulse Resp SpO2 Height Weight   (!) 142/74 98 °F (36.7 °C) Oral 71 18 94 % -- 220 lb (99.8 kg)        Results for orders placed or performed during the hospital encounter of 09/02/19   CBC Auto Differential   Result Value Ref Range    WBC 7.7 4.0 - 11.0 K/uL    RBC 3.71 (L) 4.20 - 5.90 M/uL    Hemoglobin 11.5 (L) 13.5 - 17.5 g/dL    Hematocrit 35.4 (L) 40.5 - 52.5 %    MCV 95.5 80.0 - 100.0 fL    MCH 31.0 26.0 - 34.0 pg    MCHC 32.4 31.0 - 36.0 g/dL    RDW 15.2 12.4 - 15.4 %    Platelets 457 937 - 208 K/uL    MPV 7.7 5.0 - 10.5 fL    Neutrophils % 81.8 %    Lymphocytes % 6.8 %    Monocytes % 5.3 %    Eosinophils % 5.4 %    Basophils % 0.7 %    Neutrophils Absolute 6.3 1.7 - 7.7 K/uL    Lymphocytes Absolute 0.5 (L) 1.0 - 5.1 K/uL    Monocytes Absolute 0.4 0.0 - 1.3 K/uL
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
## 2019-09-23 DIAGNOSIS — Z79.899 MEDICATION MANAGEMENT: Primary | ICD-10-CM

## 2019-09-24 RX ORDER — AMIODARONE HYDROCHLORIDE 100 MG/1
100 TABLET ORAL DAILY
Qty: 30 TABLET | Refills: 0 | Status: SHIPPED | OUTPATIENT
Start: 2019-09-24 | End: 2020-01-14

## 2019-11-11 ENCOUNTER — OFFICE VISIT (OUTPATIENT)
Dept: CARDIOLOGY CLINIC | Age: 59
End: 2019-11-11
Payer: COMMERCIAL

## 2019-11-11 VITALS
HEART RATE: 68 BPM | WEIGHT: 227 LBS | BODY MASS INDEX: 32.5 KG/M2 | HEIGHT: 70 IN | SYSTOLIC BLOOD PRESSURE: 104 MMHG | DIASTOLIC BLOOD PRESSURE: 56 MMHG

## 2019-11-11 DIAGNOSIS — I48.3 TYPICAL ATRIAL FLUTTER (HCC): ICD-10-CM

## 2019-11-11 DIAGNOSIS — I48.0 PAF (PAROXYSMAL ATRIAL FIBRILLATION) (HCC): Primary | Chronic | ICD-10-CM

## 2019-11-11 DIAGNOSIS — I50.32 CHRONIC DIASTOLIC (CONGESTIVE) HEART FAILURE (HCC): Chronic | ICD-10-CM

## 2019-11-11 PROCEDURE — 99214 OFFICE O/P EST MOD 30 MIN: CPT | Performed by: NURSE PRACTITIONER

## 2019-11-29 ENCOUNTER — HOSPITAL ENCOUNTER (EMERGENCY)
Age: 59
Discharge: HOME | End: 2019-11-29
Payer: COMMERCIAL

## 2019-11-29 ENCOUNTER — APPOINTMENT (OUTPATIENT)
Dept: GENERAL RADIOLOGY | Age: 59
End: 2019-11-29
Payer: COMMERCIAL

## 2019-11-29 ENCOUNTER — APPOINTMENT (OUTPATIENT)
Dept: CT IMAGING | Age: 59
End: 2019-11-29
Payer: COMMERCIAL

## 2019-11-29 VITALS
OXYGEN SATURATION: 96 % | RESPIRATION RATE: 20 BRPM | HEART RATE: 81 BPM | SYSTOLIC BLOOD PRESSURE: 119 MMHG | TEMPERATURE: 98.8 F | WEIGHT: 216.05 LBS | DIASTOLIC BLOOD PRESSURE: 65 MMHG | BODY MASS INDEX: 31 KG/M2

## 2019-11-29 DIAGNOSIS — J44.1 COPD EXACERBATION (HCC): Primary | ICD-10-CM

## 2019-11-29 LAB
A/G RATIO: 1 (ref 1.1–2.2)
ALBUMIN SERPL-MCNC: 3.3 G/DL (ref 3.4–5)
ALP BLD-CCNC: 93 U/L (ref 40–129)
ALT SERPL-CCNC: 11 U/L (ref 10–40)
ANION GAP SERPL CALCULATED.3IONS-SCNC: 14 MMOL/L (ref 3–16)
AST SERPL-CCNC: 20 U/L (ref 15–37)
BASE EXCESS VENOUS: 3.6 MMOL/L (ref -3–3)
BASOPHILS ABSOLUTE: 0 K/UL (ref 0–0.2)
BASOPHILS RELATIVE PERCENT: 0.7 %
BILIRUB SERPL-MCNC: <0.2 MG/DL (ref 0–1)
BUN BLDV-MCNC: 50 MG/DL (ref 7–20)
CALCIUM SERPL-MCNC: 8.7 MG/DL (ref 8.3–10.6)
CARBOXYHEMOGLOBIN: 1.9 % (ref 0–1.5)
CHLORIDE BLD-SCNC: 90 MMOL/L (ref 99–110)
CO2: 27 MMOL/L (ref 21–32)
CREAT SERPL-MCNC: 7.7 MG/DL (ref 0.8–1.3)
EOSINOPHILS ABSOLUTE: 0.3 K/UL (ref 0–0.6)
EOSINOPHILS RELATIVE PERCENT: 5.2 %
GFR AFRICAN AMERICAN: 9
GFR NON-AFRICAN AMERICAN: 7
GLOBULIN: 3.3 G/DL
GLUCOSE BLD-MCNC: 147 MG/DL (ref 70–99)
HCO3 VENOUS: 29.9 MMOL/L (ref 23–29)
HCT VFR BLD CALC: 32.6 % (ref 40.5–52.5)
HEMOGLOBIN: 10.6 G/DL (ref 13.5–17.5)
LYMPHOCYTES ABSOLUTE: 0.4 K/UL (ref 1–5.1)
LYMPHOCYTES RELATIVE PERCENT: 7.8 %
MCH RBC QN AUTO: 31.1 PG (ref 26–34)
MCHC RBC AUTO-ENTMCNC: 32.5 G/DL (ref 31–36)
MCV RBC AUTO: 95.5 FL (ref 80–100)
METHEMOGLOBIN VENOUS: 0.5 %
MONOCYTES ABSOLUTE: 0.6 K/UL (ref 0–1.3)
MONOCYTES RELATIVE PERCENT: 11.1 %
NEUTROPHILS ABSOLUTE: 4 K/UL (ref 1.7–7.7)
NEUTROPHILS RELATIVE PERCENT: 75.2 %
O2 CONTENT, VEN: 16 VOL %
O2 SAT, VEN: 98 %
O2 THERAPY: ABNORMAL
PCO2, VEN: 53.2 MMHG (ref 40–50)
PDW BLD-RTO: 14.6 % (ref 12.4–15.4)
PH VENOUS: 7.37 (ref 7.35–7.45)
PLATELET # BLD: 193 K/UL (ref 135–450)
PMV BLD AUTO: 7.4 FL (ref 5–10.5)
PO2, VEN: 118.6 MMHG (ref 25–40)
POTASSIUM REFLEX MAGNESIUM: 4.5 MMOL/L (ref 3.5–5.1)
PRO-BNP: 9418 PG/ML (ref 0–124)
RBC # BLD: 3.41 M/UL (ref 4.2–5.9)
SODIUM BLD-SCNC: 131 MMOL/L (ref 136–145)
SPECIMEN STATUS: NORMAL
TCO2 CALC VENOUS: 32 MMOL/L
TOTAL PROTEIN: 6.6 G/DL (ref 6.4–8.2)
TROPONIN: 0.05 NG/ML
WBC # BLD: 5.3 K/UL (ref 4–11)

## 2019-11-29 PROCEDURE — 6370000000 HC RX 637 (ALT 250 FOR IP): Performed by: PHYSICIAN ASSISTANT

## 2019-11-29 PROCEDURE — 84484 ASSAY OF TROPONIN QUANT: CPT

## 2019-11-29 PROCEDURE — 82803 BLOOD GASES ANY COMBINATION: CPT

## 2019-11-29 PROCEDURE — 94640 AIRWAY INHALATION TREATMENT: CPT

## 2019-11-29 PROCEDURE — 6360000002 HC RX W HCPCS: Performed by: PHYSICIAN ASSISTANT

## 2019-11-29 PROCEDURE — 80053 COMPREHEN METABOLIC PANEL: CPT

## 2019-11-29 PROCEDURE — 96374 THER/PROPH/DIAG INJ IV PUSH: CPT

## 2019-11-29 PROCEDURE — 85025 COMPLETE CBC W/AUTO DIFF WBC: CPT

## 2019-11-29 PROCEDURE — 83880 ASSAY OF NATRIURETIC PEPTIDE: CPT

## 2019-11-29 PROCEDURE — 71250 CT THORAX DX C-: CPT

## 2019-11-29 PROCEDURE — 71045 X-RAY EXAM CHEST 1 VIEW: CPT

## 2019-11-29 PROCEDURE — 99285 EMERGENCY DEPT VISIT HI MDM: CPT

## 2019-11-29 RX ORDER — IPRATROPIUM BROMIDE AND ALBUTEROL SULFATE 2.5; .5 MG/3ML; MG/3ML
3 SOLUTION RESPIRATORY (INHALATION) ONCE
Status: COMPLETED | OUTPATIENT
Start: 2019-11-29 | End: 2019-11-29

## 2019-11-29 RX ORDER — PREDNISONE 20 MG/1
60 TABLET ORAL DAILY
Qty: 15 TABLET | Refills: 0 | Status: SHIPPED | OUTPATIENT
Start: 2019-11-29 | End: 2019-12-04

## 2019-11-29 RX ORDER — METHYLPREDNISOLONE SODIUM SUCCINATE 125 MG/2ML
125 INJECTION, POWDER, LYOPHILIZED, FOR SOLUTION INTRAMUSCULAR; INTRAVENOUS ONCE
Status: COMPLETED | OUTPATIENT
Start: 2019-11-29 | End: 2019-11-29

## 2019-11-29 RX ADMIN — IPRATROPIUM BROMIDE AND ALBUTEROL SULFATE 3 AMPULE: .5; 3 SOLUTION RESPIRATORY (INHALATION) at 17:45

## 2019-11-29 RX ADMIN — METHYLPREDNISOLONE SODIUM SUCCINATE 125 MG: 125 INJECTION, POWDER, FOR SOLUTION INTRAMUSCULAR; INTRAVENOUS at 17:40

## 2019-11-29 NOTE — TELEPHONE ENCOUNTER
INR 1.8  Dose : not listed in pt chart that [facility] has. [FreeTextEntry1] : JOHN  has an epididymal cyst. These are very common findings fortunately benign. I had a discussion with the family regarding the nature of epididymal cysts and their benign course. They can grow to be large sizes and occasionally causes discomfort. Surgery is typically not indicated except for those 2 relative indications. They understand the surgery can cause damage to the epididymis and subsequent fertility issues and therefore we try to avoid surgery unless absolute necessary. All questions were answered.\par

## 2020-01-08 LAB
BUN BLDV-MCNC: 10 MG/DL
CALCIUM SERPL-MCNC: 9 MG/DL
CHLORIDE BLD-SCNC: 97 MMOL/L
CO2: 27 MMOL/L
CREAT SERPL-MCNC: 6.91 MG/DL
GFR CALCULATED: NORMAL
GLUCOSE BLD-MCNC: 123 MG/DL
POTASSIUM SERPL-SCNC: 5.3 MMOL/L
SODIUM BLD-SCNC: 133 MMOL/L

## 2020-01-15 LAB — TSH SERPL DL<=0.05 MIU/L-ACNC: 6.4 UIU/ML

## 2020-01-27 ENCOUNTER — TELEPHONE (OUTPATIENT)
Dept: CARDIOLOGY | Age: 60
End: 2020-01-27

## 2020-01-27 NOTE — TELEPHONE ENCOUNTER
Labs reviewed under media tab (please import at least his thyroid function into Epic). TSH is abnormal. Will need to recheck in 3 months. Please inform the patient. Thanks.

## 2020-01-30 RX ORDER — AMIODARONE HYDROCHLORIDE 100 MG/1
100 TABLET ORAL DAILY
Qty: 7 TABLET | Refills: 0 | Status: SHIPPED | OUTPATIENT
Start: 2020-01-30 | End: 2020-01-30 | Stop reason: SDUPTHER

## 2020-01-30 RX ORDER — AMIODARONE HYDROCHLORIDE 100 MG/1
100 TABLET ORAL DAILY
Qty: 30 TABLET | Refills: 2 | Status: SHIPPED | OUTPATIENT
Start: 2020-01-30 | End: 2020-04-13

## 2020-01-30 NOTE — TELEPHONE ENCOUNTER
LV - 11/11/2019 NPBB    NV - 05/11/2020 NPBB      Plan:   1. Continue amiodarone, metoprolol, and ASA   2. TSH and LFT every 6 months while on amiodarone (due now, ordered 10/2019)  3.  Follow up in 6 months     Labs: TSH, T4, BMP, (completed 1/2020)               CMP 11/29/2019    Labs:  T3 & Hepatic Panel not done

## 2020-02-02 ENCOUNTER — APPOINTMENT (OUTPATIENT)
Dept: GENERAL RADIOLOGY | Age: 60
End: 2020-02-02
Payer: COMMERCIAL

## 2020-02-02 ENCOUNTER — HOSPITAL ENCOUNTER (EMERGENCY)
Age: 60
Discharge: HOME | End: 2020-02-02
Attending: EMERGENCY MEDICINE
Payer: COMMERCIAL

## 2020-02-02 VITALS
WEIGHT: 218 LBS | SYSTOLIC BLOOD PRESSURE: 115 MMHG | HEART RATE: 88 BPM | DIASTOLIC BLOOD PRESSURE: 60 MMHG | RESPIRATION RATE: 14 BRPM | BODY MASS INDEX: 31.21 KG/M2 | OXYGEN SATURATION: 100 % | TEMPERATURE: 98.2 F | HEIGHT: 70 IN

## 2020-02-02 LAB
A/G RATIO: 0.9 (ref 1.1–2.2)
ALBUMIN SERPL-MCNC: 3.1 G/DL (ref 3.4–5)
ALP BLD-CCNC: 83 U/L (ref 40–129)
ALT SERPL-CCNC: 7 U/L (ref 10–40)
ANION GAP SERPL CALCULATED.3IONS-SCNC: 14 MMOL/L (ref 3–16)
AST SERPL-CCNC: 18 U/L (ref 15–37)
BASE EXCESS VENOUS: 3.9 MMOL/L (ref -3–3)
BASOPHILS ABSOLUTE: 0.1 K/UL (ref 0–0.2)
BASOPHILS RELATIVE PERCENT: 0.6 %
BILIRUB SERPL-MCNC: <0.2 MG/DL (ref 0–1)
BUN BLDV-MCNC: 32 MG/DL (ref 7–20)
CALCIUM SERPL-MCNC: 8.9 MG/DL (ref 8.3–10.6)
CARBOXYHEMOGLOBIN: 1.6 % (ref 0–1.5)
CHLORIDE BLD-SCNC: 94 MMOL/L (ref 99–110)
CO2: 26 MMOL/L (ref 21–32)
CREAT SERPL-MCNC: 6.6 MG/DL (ref 0.8–1.3)
EKG ATRIAL RATE: 87 BPM
EKG DIAGNOSIS: NORMAL
EKG P AXIS: 6 DEGREES
EKG P-R INTERVAL: 136 MS
EKG Q-T INTERVAL: 392 MS
EKG QRS DURATION: 84 MS
EKG QTC CALCULATION (BAZETT): 471 MS
EKG R AXIS: 25 DEGREES
EKG T AXIS: 43 DEGREES
EKG VENTRICULAR RATE: 87 BPM
EOSINOPHILS ABSOLUTE: 0.1 K/UL (ref 0–0.6)
EOSINOPHILS RELATIVE PERCENT: 1.1 %
GFR AFRICAN AMERICAN: 10
GFR NON-AFRICAN AMERICAN: 9
GLOBULIN: 3.6 G/DL
GLUCOSE BLD-MCNC: 201 MG/DL (ref 70–99)
HCO3 VENOUS: 29 MMOL/L (ref 23–29)
HCT VFR BLD CALC: 50.4 % (ref 40.5–52.5)
HEMOGLOBIN: 15.9 G/DL (ref 13.5–17.5)
LYMPHOCYTES ABSOLUTE: 0.4 K/UL (ref 1–5.1)
LYMPHOCYTES RELATIVE PERCENT: 3.6 %
MCH RBC QN AUTO: 29.9 PG (ref 26–34)
MCHC RBC AUTO-ENTMCNC: 31.5 G/DL (ref 31–36)
MCV RBC AUTO: 95.1 FL (ref 80–100)
METHEMOGLOBIN VENOUS: 0.4 %
MONOCYTES ABSOLUTE: 0.4 K/UL (ref 0–1.3)
MONOCYTES RELATIVE PERCENT: 4.2 %
NEUTROPHILS ABSOLUTE: 9.6 K/UL (ref 1.7–7.7)
NEUTROPHILS RELATIVE PERCENT: 90.5 %
O2 CONTENT, VEN: 16 VOL %
O2 SAT, VEN: 95 %
O2 THERAPY: ABNORMAL
PCO2, VEN: 45.8 MMHG (ref 40–50)
PDW BLD-RTO: 16.8 % (ref 12.4–15.4)
PH VENOUS: 7.42 (ref 7.35–7.45)
PLATELET # BLD: 167 K/UL (ref 135–450)
PMV BLD AUTO: 7.8 FL (ref 5–10.5)
PO2, VEN: 70.9 MMHG (ref 25–40)
POTASSIUM REFLEX MAGNESIUM: 3.9 MMOL/L (ref 3.5–5.1)
PRO-BNP: 8342 PG/ML (ref 0–124)
RBC # BLD: 5.3 M/UL (ref 4.2–5.9)
SODIUM BLD-SCNC: 134 MMOL/L (ref 136–145)
TCO2 CALC VENOUS: 30 MMOL/L
TOTAL PROTEIN: 6.7 G/DL (ref 6.4–8.2)
TROPONIN: 0.05 NG/ML
WBC # BLD: 10.6 K/UL (ref 4–11)

## 2020-02-02 PROCEDURE — 82803 BLOOD GASES ANY COMBINATION: CPT

## 2020-02-02 PROCEDURE — 93010 ELECTROCARDIOGRAM REPORT: CPT | Performed by: INTERNAL MEDICINE

## 2020-02-02 PROCEDURE — 99285 EMERGENCY DEPT VISIT HI MDM: CPT

## 2020-02-02 PROCEDURE — 6370000000 HC RX 637 (ALT 250 FOR IP): Performed by: EMERGENCY MEDICINE

## 2020-02-02 PROCEDURE — 94640 AIRWAY INHALATION TREATMENT: CPT

## 2020-02-02 PROCEDURE — 94761 N-INVAS EAR/PLS OXIMETRY MLT: CPT

## 2020-02-02 PROCEDURE — 80053 COMPREHEN METABOLIC PANEL: CPT

## 2020-02-02 PROCEDURE — 84484 ASSAY OF TROPONIN QUANT: CPT

## 2020-02-02 PROCEDURE — 2700000000 HC OXYGEN THERAPY PER DAY

## 2020-02-02 PROCEDURE — 83880 ASSAY OF NATRIURETIC PEPTIDE: CPT

## 2020-02-02 PROCEDURE — 93005 ELECTROCARDIOGRAM TRACING: CPT | Performed by: EMERGENCY MEDICINE

## 2020-02-02 PROCEDURE — 71045 X-RAY EXAM CHEST 1 VIEW: CPT

## 2020-02-02 PROCEDURE — 85025 COMPLETE CBC W/AUTO DIFF WBC: CPT

## 2020-02-02 RX ORDER — AZITHROMYCIN 250 MG/1
500 TABLET, FILM COATED ORAL ONCE
Status: COMPLETED | OUTPATIENT
Start: 2020-02-02 | End: 2020-02-02

## 2020-02-02 RX ORDER — FLUTICASONE PROPIONATE 50 MCG
1 SPRAY, SUSPENSION (ML) NASAL DAILY
Qty: 1 BOTTLE | Refills: 0 | Status: ON HOLD | OUTPATIENT
Start: 2020-02-02 | End: 2020-10-27

## 2020-02-02 RX ORDER — AZITHROMYCIN 250 MG/1
250 TABLET, FILM COATED ORAL DAILY
Qty: 6 TABLET | Refills: 0 | Status: SHIPPED | OUTPATIENT
Start: 2020-02-02 | End: 2020-02-07

## 2020-02-02 RX ORDER — IPRATROPIUM BROMIDE AND ALBUTEROL SULFATE 2.5; .5 MG/3ML; MG/3ML
2 SOLUTION RESPIRATORY (INHALATION) ONCE
Status: COMPLETED | OUTPATIENT
Start: 2020-02-02 | End: 2020-02-02

## 2020-02-02 RX ORDER — ALBUTEROL SULFATE 2.5 MG/3ML
2.5 SOLUTION RESPIRATORY (INHALATION) EVERY 4 HOURS PRN
Qty: 25 EACH | Refills: 0 | Status: SHIPPED | OUTPATIENT
Start: 2020-02-02

## 2020-02-02 RX ORDER — PREDNISONE 10 MG/1
40 TABLET ORAL DAILY
Qty: 16 TABLET | Refills: 0 | Status: SHIPPED | OUTPATIENT
Start: 2020-02-02 | End: 2020-02-06

## 2020-02-02 RX ORDER — PREDNISONE 20 MG/1
40 TABLET ORAL ONCE
Status: COMPLETED | OUTPATIENT
Start: 2020-02-02 | End: 2020-02-02

## 2020-02-02 RX ORDER — ALBUTEROL SULFATE 90 UG/1
2 AEROSOL, METERED RESPIRATORY (INHALATION) EVERY 4 HOURS PRN
Qty: 1 INHALER | Refills: 0 | Status: SHIPPED | OUTPATIENT
Start: 2020-02-02 | End: 2020-03-15 | Stop reason: SDUPTHER

## 2020-02-02 RX ADMIN — AZITHROMYCIN MONOHYDRATE 500 MG: 250 TABLET ORAL at 06:39

## 2020-02-02 RX ADMIN — IPRATROPIUM BROMIDE AND ALBUTEROL SULFATE 2 AMPULE: .5; 3 SOLUTION RESPIRATORY (INHALATION) at 05:34

## 2020-02-02 RX ADMIN — PREDNISONE 40 MG: 20 TABLET ORAL at 06:39
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
## 2020-03-15 ENCOUNTER — APPOINTMENT (OUTPATIENT)
Dept: GENERAL RADIOLOGY | Age: 60
End: 2020-03-15
Payer: COMMERCIAL

## 2020-03-15 ENCOUNTER — HOSPITAL ENCOUNTER (EMERGENCY)
Age: 60
Discharge: HOME | End: 2020-03-16
Attending: EMERGENCY MEDICINE
Payer: COMMERCIAL

## 2020-03-15 LAB
A/G RATIO: 1.1 (ref 1.1–2.2)
ALBUMIN SERPL-MCNC: 4 G/DL (ref 3.4–5)
ALP BLD-CCNC: 119 U/L (ref 40–129)
ALT SERPL-CCNC: 9 U/L (ref 10–40)
ANION GAP SERPL CALCULATED.3IONS-SCNC: 13 MMOL/L (ref 3–16)
AST SERPL-CCNC: 12 U/L (ref 15–37)
BASOPHILS ABSOLUTE: 0.1 K/UL (ref 0–0.2)
BASOPHILS RELATIVE PERCENT: 1.1 %
BILIRUB SERPL-MCNC: <0.2 MG/DL (ref 0–1)
BUN BLDV-MCNC: 41 MG/DL (ref 7–20)
CALCIUM SERPL-MCNC: 9.4 MG/DL (ref 8.3–10.6)
CHLORIDE BLD-SCNC: 96 MMOL/L (ref 99–110)
CO2: 27 MMOL/L (ref 21–32)
CREAT SERPL-MCNC: 7.9 MG/DL (ref 0.8–1.3)
EOSINOPHILS ABSOLUTE: 0.6 K/UL (ref 0–0.6)
EOSINOPHILS RELATIVE PERCENT: 8.4 %
GFR AFRICAN AMERICAN: 8
GFR NON-AFRICAN AMERICAN: 7
GLOBULIN: 3.8 G/DL
GLUCOSE BLD-MCNC: 122 MG/DL (ref 70–99)
HCT VFR BLD CALC: 36.9 % (ref 40.5–52.5)
HEMOGLOBIN: 11.9 G/DL (ref 13.5–17.5)
LYMPHOCYTES ABSOLUTE: 1.2 K/UL (ref 1–5.1)
LYMPHOCYTES RELATIVE PERCENT: 15.3 %
MCH RBC QN AUTO: 30.6 PG (ref 26–34)
MCHC RBC AUTO-ENTMCNC: 32.3 G/DL (ref 31–36)
MCV RBC AUTO: 94.8 FL (ref 80–100)
MONOCYTES ABSOLUTE: 0.5 K/UL (ref 0–1.3)
MONOCYTES RELATIVE PERCENT: 7 %
NEUTROPHILS ABSOLUTE: 5.2 K/UL (ref 1.7–7.7)
NEUTROPHILS RELATIVE PERCENT: 68.2 %
PDW BLD-RTO: 15.8 % (ref 12.4–15.4)
PLATELET # BLD: 294 K/UL (ref 135–450)
PMV BLD AUTO: 6.8 FL (ref 5–10.5)
POTASSIUM SERPL-SCNC: 4.9 MMOL/L (ref 3.5–5.1)
RAPID INFLUENZA  B AGN: NEGATIVE
RAPID INFLUENZA A AGN: NEGATIVE
RBC # BLD: 3.89 M/UL (ref 4.2–5.9)
SODIUM BLD-SCNC: 136 MMOL/L (ref 136–145)
TOTAL PROTEIN: 7.8 G/DL (ref 6.4–8.2)
TROPONIN: 0.05 NG/ML
WBC # BLD: 7.6 K/UL (ref 4–11)

## 2020-03-15 PROCEDURE — 6360000002 HC RX W HCPCS: Performed by: EMERGENCY MEDICINE

## 2020-03-15 PROCEDURE — 85025 COMPLETE CBC W/AUTO DIFF WBC: CPT

## 2020-03-15 PROCEDURE — 87804 INFLUENZA ASSAY W/OPTIC: CPT

## 2020-03-15 PROCEDURE — 84484 ASSAY OF TROPONIN QUANT: CPT

## 2020-03-15 PROCEDURE — 2580000003 HC RX 258: Performed by: EMERGENCY MEDICINE

## 2020-03-15 PROCEDURE — 96365 THER/PROPH/DIAG IV INF INIT: CPT

## 2020-03-15 PROCEDURE — 93005 ELECTROCARDIOGRAM TRACING: CPT | Performed by: EMERGENCY MEDICINE

## 2020-03-15 PROCEDURE — 94640 AIRWAY INHALATION TREATMENT: CPT

## 2020-03-15 PROCEDURE — 80053 COMPREHEN METABOLIC PANEL: CPT

## 2020-03-15 PROCEDURE — 6370000000 HC RX 637 (ALT 250 FOR IP): Performed by: EMERGENCY MEDICINE

## 2020-03-15 PROCEDURE — 2700000000 HC OXYGEN THERAPY PER DAY

## 2020-03-15 PROCEDURE — 94761 N-INVAS EAR/PLS OXIMETRY MLT: CPT

## 2020-03-15 PROCEDURE — 99284 EMERGENCY DEPT VISIT MOD MDM: CPT

## 2020-03-15 PROCEDURE — 71046 X-RAY EXAM CHEST 2 VIEWS: CPT

## 2020-03-15 RX ORDER — AMOXICILLIN AND CLAVULANATE POTASSIUM 500; 125 MG/1; MG/1
1 TABLET, FILM COATED ORAL 3 TIMES DAILY
Qty: 30 TABLET | Refills: 0 | Status: SHIPPED | OUTPATIENT
Start: 2020-03-15 | End: 2020-03-25

## 2020-03-15 RX ORDER — ALBUTEROL SULFATE 90 UG/1
2 AEROSOL, METERED RESPIRATORY (INHALATION) EVERY 6 HOURS PRN
Qty: 1 INHALER | Refills: 0 | Status: SHIPPED | OUTPATIENT
Start: 2020-03-15 | End: 2021-03-15

## 2020-03-15 RX ORDER — AZITHROMYCIN 250 MG/1
250 TABLET, FILM COATED ORAL DAILY
Qty: 4 TABLET | Refills: 0 | Status: SHIPPED | OUTPATIENT
Start: 2020-03-16 | End: 2020-03-20

## 2020-03-15 RX ORDER — ALBUTEROL SULFATE 90 UG/1
2 AEROSOL, METERED RESPIRATORY (INHALATION) ONCE
Status: COMPLETED | OUTPATIENT
Start: 2020-03-15 | End: 2020-03-15

## 2020-03-15 RX ADMIN — CEFTRIAXONE SODIUM 1 G: 1 INJECTION, POWDER, FOR SOLUTION INTRAMUSCULAR; INTRAVENOUS at 23:39

## 2020-03-15 RX ADMIN — Medication 2 PUFF: at 23:51

## 2020-03-15 ASSESSMENT — ENCOUNTER SYMPTOMS
VOMITING: 0
COUGH: 1
ABDOMINAL PAIN: 0
NAUSEA: 0
RHINORRHEA: 1
EYE DISCHARGE: 0
SHORTNESS OF BREATH: 1

## 2020-03-15 ASSESSMENT — PAIN SCALES - GENERAL: PAINLEVEL_OUTOF10: 6

## 2020-03-15 ASSESSMENT — PAIN DESCRIPTION - LOCATION: LOCATION: HEAD

## 2020-03-15 ASSESSMENT — PAIN DESCRIPTION - PAIN TYPE: TYPE: ACUTE PAIN

## 2020-03-16 VITALS
DIASTOLIC BLOOD PRESSURE: 77 MMHG | SYSTOLIC BLOOD PRESSURE: 116 MMHG | OXYGEN SATURATION: 94 % | WEIGHT: 221 LBS | RESPIRATION RATE: 15 BRPM | TEMPERATURE: 97.9 F | HEIGHT: 71 IN | HEART RATE: 58 BPM | BODY MASS INDEX: 30.94 KG/M2

## 2020-03-16 LAB
EKG ATRIAL RATE: 59 BPM
EKG DIAGNOSIS: NORMAL
EKG P AXIS: 28 DEGREES
EKG P-R INTERVAL: 160 MS
EKG Q-T INTERVAL: 458 MS
EKG QRS DURATION: 78 MS
EKG QTC CALCULATION (BAZETT): 453 MS
EKG R AXIS: 14 DEGREES
EKG T AXIS: 21 DEGREES
EKG VENTRICULAR RATE: 59 BPM

## 2020-03-16 PROCEDURE — 6370000000 HC RX 637 (ALT 250 FOR IP): Performed by: EMERGENCY MEDICINE

## 2020-03-16 PROCEDURE — 93010 ELECTROCARDIOGRAM REPORT: CPT | Performed by: INTERNAL MEDICINE

## 2020-03-16 RX ORDER — AZITHROMYCIN 250 MG/1
500 TABLET, FILM COATED ORAL ONCE
Status: COMPLETED | OUTPATIENT
Start: 2020-03-16 | End: 2020-03-16

## 2020-03-16 RX ADMIN — AZITHROMYCIN MONOHYDRATE 500 MG: 250 TABLET ORAL at 00:21
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
## 2020-03-18 ENCOUNTER — APPOINTMENT (OUTPATIENT)
Dept: GENERAL RADIOLOGY | Age: 60
End: 2020-03-18
Payer: COMMERCIAL

## 2020-03-18 ENCOUNTER — HOSPITAL ENCOUNTER (EMERGENCY)
Age: 60
Discharge: HOME | End: 2020-03-18
Attending: EMERGENCY MEDICINE
Payer: COMMERCIAL

## 2020-03-18 VITALS
WEIGHT: 226 LBS | SYSTOLIC BLOOD PRESSURE: 122 MMHG | TEMPERATURE: 97.7 F | DIASTOLIC BLOOD PRESSURE: 69 MMHG | OXYGEN SATURATION: 100 % | HEART RATE: 70 BPM | HEIGHT: 70 IN | RESPIRATION RATE: 22 BRPM | BODY MASS INDEX: 32.35 KG/M2

## 2020-03-18 LAB
A/G RATIO: 1 (ref 1.1–2.2)
ALBUMIN SERPL-MCNC: 3.8 G/DL (ref 3.4–5)
ALP BLD-CCNC: 117 U/L (ref 40–129)
ALT SERPL-CCNC: 9 U/L (ref 10–40)
ANION GAP SERPL CALCULATED.3IONS-SCNC: 11 MMOL/L (ref 3–16)
AST SERPL-CCNC: 18 U/L (ref 15–37)
BASOPHILS ABSOLUTE: 0.1 K/UL (ref 0–0.2)
BASOPHILS RELATIVE PERCENT: 1.3 %
BILIRUB SERPL-MCNC: <0.2 MG/DL (ref 0–1)
BUN BLDV-MCNC: 20 MG/DL (ref 7–20)
CALCIUM SERPL-MCNC: 9.3 MG/DL (ref 8.3–10.6)
CHLORIDE BLD-SCNC: 93 MMOL/L (ref 99–110)
CO2: 26 MMOL/L (ref 21–32)
CREAT SERPL-MCNC: 4.3 MG/DL (ref 0.8–1.3)
EOSINOPHILS ABSOLUTE: 0.6 K/UL (ref 0–0.6)
EOSINOPHILS RELATIVE PERCENT: 7.6 %
GFR AFRICAN AMERICAN: 17
GFR NON-AFRICAN AMERICAN: 14
GLOBULIN: 3.8 G/DL
GLUCOSE BLD-MCNC: 117 MG/DL (ref 70–99)
HCT VFR BLD CALC: 36.3 % (ref 40.5–52.5)
HEMOGLOBIN: 11.9 G/DL (ref 13.5–17.5)
LACTIC ACID, SEPSIS: 1.2 MMOL/L (ref 0.4–1.9)
LYMPHOCYTES ABSOLUTE: 0.9 K/UL (ref 1–5.1)
LYMPHOCYTES RELATIVE PERCENT: 12.1 %
MCH RBC QN AUTO: 30.9 PG (ref 26–34)
MCHC RBC AUTO-ENTMCNC: 32.7 G/DL (ref 31–36)
MCV RBC AUTO: 94.4 FL (ref 80–100)
MONOCYTES ABSOLUTE: 0.5 K/UL (ref 0–1.3)
MONOCYTES RELATIVE PERCENT: 6.7 %
NEUTROPHILS ABSOLUTE: 5.5 K/UL (ref 1.7–7.7)
NEUTROPHILS RELATIVE PERCENT: 72.3 %
PDW BLD-RTO: 15.9 % (ref 12.4–15.4)
PLATELET # BLD: 311 K/UL (ref 135–450)
PMV BLD AUTO: 7.1 FL (ref 5–10.5)
POTASSIUM REFLEX MAGNESIUM: 4.5 MMOL/L (ref 3.5–5.1)
RAPID INFLUENZA  B AGN: NEGATIVE
RAPID INFLUENZA A AGN: NEGATIVE
RBC # BLD: 3.84 M/UL (ref 4.2–5.9)
SODIUM BLD-SCNC: 130 MMOL/L (ref 136–145)
TOTAL PROTEIN: 7.6 G/DL (ref 6.4–8.2)
WBC # BLD: 7.6 K/UL (ref 4–11)

## 2020-03-18 PROCEDURE — 71045 X-RAY EXAM CHEST 1 VIEW: CPT

## 2020-03-18 PROCEDURE — 87040 BLOOD CULTURE FOR BACTERIA: CPT

## 2020-03-18 PROCEDURE — 99285 EMERGENCY DEPT VISIT HI MDM: CPT

## 2020-03-18 PROCEDURE — 80053 COMPREHEN METABOLIC PANEL: CPT

## 2020-03-18 PROCEDURE — 85025 COMPLETE CBC W/AUTO DIFF WBC: CPT

## 2020-03-18 PROCEDURE — 87804 INFLUENZA ASSAY W/OPTIC: CPT

## 2020-03-18 PROCEDURE — 83605 ASSAY OF LACTIC ACID: CPT

## 2020-03-18 PROCEDURE — 2580000003 HC RX 258: Performed by: EMERGENCY MEDICINE

## 2020-03-18 RX ORDER — 0.9 % SODIUM CHLORIDE 0.9 %
250 INTRAVENOUS SOLUTION INTRAVENOUS ONCE
Status: COMPLETED | OUTPATIENT
Start: 2020-03-18 | End: 2020-03-18

## 2020-03-18 RX ADMIN — SODIUM CHLORIDE 250 ML: 900 INJECTION, SOLUTION INTRAVENOUS at 21:02

## 2020-03-18 ASSESSMENT — PAIN SCALES - GENERAL: PAINLEVEL_OUTOF10: 0

## 2020-03-18 NOTE — ED NOTES
Bed: 16  Expected date:   Expected time:   Means of arrival:   Comments:  Elliot Perry RN  03/18/20 9722

## 2020-03-19 ENCOUNTER — CARE COORDINATION (OUTPATIENT)
Dept: CARE COORDINATION | Age: 60
End: 2020-03-19

## 2020-03-19 NOTE — ED PROVIDER NOTES
THORACENTESIS      THORACOSCOPY Right 2013    with biopsy    TONSILLECTOMY      TRANSESOPHAGEAL ECHOCARDIOGRAM  2018    Watchman in good place     Family History   Problem Relation Age of Onset    Cancer Mother     Diabetes Father     Cancer Sister     Asthma Neg Hx     Emphysema Neg Hx     Heart Failure Neg Hx     Hypertension Neg Hx      Social History     Socioeconomic History    Marital status: Single     Spouse name: Not on file    Number of children: Not on file    Years of education: Not on file    Highest education level: Not on file   Occupational History    Not on file   Social Needs    Financial resource strain: Not on file    Food insecurity     Worry: Not on file     Inability: Not on file    Transportation needs     Medical: Not on file     Non-medical: Not on file   Tobacco Use    Smoking status: Former Smoker     Packs/day: 1.50     Years: 3.00     Pack years: 4.50     Types: Cigarettes     Last attempt to quit: 1977     Years since quittin.9    Smokeless tobacco: Never Used   Substance and Sexual Activity    Alcohol use: No     Alcohol/week: 0.0 standard drinks    Drug use: No    Sexual activity: Not on file   Lifestyle    Physical activity     Days per week: Not on file     Minutes per session: Not on file    Stress: Not on file   Relationships    Social connections     Talks on phone: Not on file     Gets together: Not on file     Attends Yarsani service: Not on file     Active member of club or organization: Not on file     Attends meetings of clubs or organizations: Not on file     Relationship status: Not on file    Intimate partner violence     Fear of current or ex partner: Not on file     Emotionally abused: Not on file     Physically abused: Not on file     Forced sexual activity: Not on file   Other Topics Concern    Not on file   Social History Narrative    Not on file     No current facility-administered medications for this encounter. MPV 7.1 5.0 - 10.5 fL    Neutrophils % 72.3 %    Lymphocytes % 12.1 %    Monocytes % 6.7 %    Eosinophils % 7.6 %    Basophils % 1.3 %    Neutrophils Absolute 5.5 1.7 - 7.7 K/uL    Lymphocytes Absolute 0.9 (L) 1.0 - 5.1 K/uL    Monocytes Absolute 0.5 0.0 - 1.3 K/uL    Eosinophils Absolute 0.6 0.0 - 0.6 K/uL    Basophils Absolute 0.1 0.0 - 0.2 K/uL   Comprehensive Metabolic Panel w/ Reflex to MG   Result Value Ref Range    Sodium 130 (L) 136 - 145 mmol/L    Potassium reflex Magnesium 4.5 3.5 - 5.1 mmol/L    Chloride 93 (L) 99 - 110 mmol/L    CO2 26 21 - 32 mmol/L    Anion Gap 11 3 - 16    Glucose 117 (H) 70 - 99 mg/dL    BUN 20 7 - 20 mg/dL    CREATININE 4.3 (H) 0.8 - 1.3 mg/dL    GFR Non-African American 14 (A) >60    GFR  17 (A) >60    Calcium 9.3 8.3 - 10.6 mg/dL    Total Protein 7.6 6.4 - 8.2 g/dL    Alb 3.8 3.4 - 5.0 g/dL    Albumin/Globulin Ratio 1.0 (L) 1.1 - 2.2    Total Bilirubin <0.2 0.0 - 1.0 mg/dL    Alkaline Phosphatase 117 40 - 129 U/L    ALT 9 (L) 10 - 40 U/L    AST 18 15 - 37 U/L    Globulin 3.8 g/dL   Lactate, Sepsis   Result Value Ref Range    Lactic Acid, Sepsis 1.2 0.4 - 1.9 mmol/L       RADIOLOGY  X-RAYS:  I have reviewed radiologic plain film image(s). ALL OTHER NON-PLAIN FILM IMAGES SUCH AS CT, ULTRASOUND AND MRI HAVE BEEN READ BY THE RADIOLOGIST. XR CHEST PORTABLE   Final Result   Stable bilateral pleural effusions or pleural thickening with bibasilar   volume loss. Cardiomegaly with questionable perihilar edema. Rechecks: Physical assessment performed. 1936: Blood pressure 84/45.    2037: Blood pressure 112/66.    2131: SPO2 reported to 80%. Inaccurate. This was rechecked and found to be 100% on 4 L of O2. Critical Care: Critical care 35 minutes was completed on patient in addition to and excluding any procedures noted          ED COURSE/MDM  Patient seen and evaluated. Old records reviewed.  Labs and imaging reviewed and results discussed with patient. Patient was given nebulizer treatment and Solu-Medrol by squad. He is currently on Augmentin at home. Patient was found to be hypotensive upon arrival into the emergency department. As he had dialysis earlier today he was given a 250 cc bolus of normal saline with resolution of blood pressure. O2 sats remained stable throughout his stay. He maintained sats of approximately  throughout his stay in spite of being weaned to 2 L. Patient has no COVID-19 risk factors such as recent exposure to documented cases, or foreign travel. . Patient was reassessed as noted above . No acute pathology was noted and pt is safe for discharge home to follow up with PCP/nephrology. Patient is felt to be stable at this time. Symptoms most likely are related to resolving pneumonia with mild hypotension postdialysis. . Plan of care discussed with patient and family. Patient and family in agreement with plan. Patient was given scripts for the following medications. I counseled patient how to take these medications. New Prescriptions    No medications on file       CLINICAL IMPRESSION  1. Hypotension due to hypovolemia    2. Shortness of breath    3. Hx of bacterial pneumonia    4. History of end stage renal disease        Blood pressure 119/66, pulse 69, temperature 97.7 °F (36.5 °C), temperature source Oral, resp. rate 20, height 5' 10\" (1.778 m), weight 226 lb (102.5 kg), SpO2 99 %. DISPOSITION  Rosmery Roman was discharged to home in stable condition.         Rupa Myers,   03/18/20 2047

## 2020-03-22 LAB
BLOOD CULTURE, ROUTINE: NORMAL
CULTURE, BLOOD 2: NORMAL

## 2020-04-01 ENCOUNTER — CARE COORDINATION (OUTPATIENT)
Dept: CARE COORDINATION | Age: 60
End: 2020-04-01

## 2020-04-11 ENCOUNTER — TELEPHONE (OUTPATIENT)
Dept: CARDIOLOGY CLINIC | Age: 60
End: 2020-04-11

## 2020-04-13 RX ORDER — AMIODARONE HYDROCHLORIDE 100 MG/1
100 TABLET ORAL DAILY
Qty: 30 TABLET | Refills: 0 | Status: SHIPPED | OUTPATIENT
Start: 2020-04-13 | End: 2020-05-04

## 2020-04-13 NOTE — TELEPHONE ENCOUNTER
I approved one month of amiodarone but he needs a TSH and free T4. He should ask if they can draw it during dialysis.

## 2020-04-13 NOTE — TELEPHONE ENCOUNTER
11/11/2019 NPBB  Plan:   1. Continue amiodarone, metoprolol, and ASA   2. TSH and LFT every 6 months while on amiodarone (due now, ordered 10/2019)  3.  Follow up in 6 months     1/15/2020 TSH

## 2020-04-14 ENCOUNTER — HOSPITAL ENCOUNTER (INPATIENT)
Age: 60
LOS: 4 days | Discharge: HOME | DRG: 255 | End: 2020-04-18
Attending: EMERGENCY MEDICINE | Admitting: INTERNAL MEDICINE
Payer: COMMERCIAL

## 2020-04-14 ENCOUNTER — APPOINTMENT (OUTPATIENT)
Dept: GENERAL RADIOLOGY | Age: 60
DRG: 255 | End: 2020-04-14
Payer: COMMERCIAL

## 2020-04-14 PROBLEM — M86.9 OSTEOMYELITIS OF GREAT TOE OF RIGHT FOOT (HCC): Status: ACTIVE | Noted: 2020-04-14

## 2020-04-14 LAB
A/G RATIO: 1 (ref 1.1–2.2)
ALBUMIN SERPL-MCNC: 3.8 G/DL (ref 3.4–5)
ALP BLD-CCNC: 109 U/L (ref 40–129)
ALT SERPL-CCNC: 7 U/L (ref 10–40)
ANION GAP SERPL CALCULATED.3IONS-SCNC: 13 MMOL/L (ref 3–16)
AST SERPL-CCNC: 16 U/L (ref 15–37)
BASOPHILS ABSOLUTE: 0.1 K/UL (ref 0–0.2)
BASOPHILS RELATIVE PERCENT: 1.3 %
BILIRUB SERPL-MCNC: <0.2 MG/DL (ref 0–1)
BUN BLDV-MCNC: 53 MG/DL (ref 7–20)
C-REACTIVE PROTEIN: 11 MG/L (ref 0–5.1)
CALCIUM SERPL-MCNC: 9.8 MG/DL (ref 8.3–10.6)
CHLORIDE BLD-SCNC: 95 MMOL/L (ref 99–110)
CO2: 28 MMOL/L (ref 21–32)
CREAT SERPL-MCNC: 6.5 MG/DL (ref 0.8–1.3)
EOSINOPHILS ABSOLUTE: 0.6 K/UL (ref 0–0.6)
EOSINOPHILS RELATIVE PERCENT: 7.1 %
GFR AFRICAN AMERICAN: 11
GFR NON-AFRICAN AMERICAN: 9
GLOBULIN: 3.7 G/DL
GLUCOSE BLD-MCNC: 101 MG/DL (ref 70–99)
GLUCOSE BLD-MCNC: 143 MG/DL (ref 70–99)
GLUCOSE BLD-MCNC: 169 MG/DL (ref 70–99)
GLUCOSE BLD-MCNC: 177 MG/DL (ref 70–99)
HCT VFR BLD CALC: 37.4 % (ref 40.5–52.5)
HEMOGLOBIN: 12.1 G/DL (ref 13.5–17.5)
LACTIC ACID: 1 MMOL/L (ref 0.4–2)
LYMPHOCYTES ABSOLUTE: 1.1 K/UL (ref 1–5.1)
LYMPHOCYTES RELATIVE PERCENT: 13.3 %
MCH RBC QN AUTO: 30.7 PG (ref 26–34)
MCHC RBC AUTO-ENTMCNC: 32.4 G/DL (ref 31–36)
MCV RBC AUTO: 94.8 FL (ref 80–100)
MONOCYTES ABSOLUTE: 0.7 K/UL (ref 0–1.3)
MONOCYTES RELATIVE PERCENT: 8.5 %
NEUTROPHILS ABSOLUTE: 5.6 K/UL (ref 1.7–7.7)
NEUTROPHILS RELATIVE PERCENT: 69.8 %
PDW BLD-RTO: 15.3 % (ref 12.4–15.4)
PERFORMED ON: ABNORMAL
PLATELET # BLD: 284 K/UL (ref 135–450)
PMV BLD AUTO: 7 FL (ref 5–10.5)
POTASSIUM REFLEX MAGNESIUM: 5.3 MMOL/L (ref 3.5–5.1)
RBC # BLD: 3.95 M/UL (ref 4.2–5.9)
SEDIMENTATION RATE, ERYTHROCYTE: 35 MM/HR (ref 0–20)
SODIUM BLD-SCNC: 136 MMOL/L (ref 136–145)
TOTAL PROTEIN: 7.5 G/DL (ref 6.4–8.2)
WBC # BLD: 8 K/UL (ref 4–11)

## 2020-04-14 PROCEDURE — 86140 C-REACTIVE PROTEIN: CPT

## 2020-04-14 PROCEDURE — 80053 COMPREHEN METABOLIC PANEL: CPT

## 2020-04-14 PROCEDURE — 6370000000 HC RX 637 (ALT 250 FOR IP): Performed by: INTERNAL MEDICINE

## 2020-04-14 PROCEDURE — 94761 N-INVAS EAR/PLS OXIMETRY MLT: CPT

## 2020-04-14 PROCEDURE — 99285 EMERGENCY DEPT VISIT HI MDM: CPT

## 2020-04-14 PROCEDURE — 6360000002 HC RX W HCPCS: Performed by: NURSE PRACTITIONER

## 2020-04-14 PROCEDURE — 93005 ELECTROCARDIOGRAM TRACING: CPT | Performed by: NURSE PRACTITIONER

## 2020-04-14 PROCEDURE — 2580000003 HC RX 258: Performed by: INTERNAL MEDICINE

## 2020-04-14 PROCEDURE — 1200000000 HC SEMI PRIVATE

## 2020-04-14 PROCEDURE — 2580000003 HC RX 258: Performed by: NURSE PRACTITIONER

## 2020-04-14 PROCEDURE — 85652 RBC SED RATE AUTOMATED: CPT

## 2020-04-14 PROCEDURE — 83605 ASSAY OF LACTIC ACID: CPT

## 2020-04-14 PROCEDURE — 85025 COMPLETE CBC W/AUTO DIFF WBC: CPT

## 2020-04-14 PROCEDURE — 6360000002 HC RX W HCPCS: Performed by: INTERNAL MEDICINE

## 2020-04-14 PROCEDURE — 87040 BLOOD CULTURE FOR BACTERIA: CPT

## 2020-04-14 PROCEDURE — 94760 N-INVAS EAR/PLS OXIMETRY 1: CPT

## 2020-04-14 PROCEDURE — 2700000000 HC OXYGEN THERAPY PER DAY

## 2020-04-14 PROCEDURE — 73630 X-RAY EXAM OF FOOT: CPT

## 2020-04-14 RX ORDER — ACETAMINOPHEN 650 MG/1
650 SUPPOSITORY RECTAL EVERY 6 HOURS PRN
Status: DISCONTINUED | OUTPATIENT
Start: 2020-04-14 | End: 2020-04-18 | Stop reason: HOSPADM

## 2020-04-14 RX ORDER — ATORVASTATIN CALCIUM 40 MG/1
40 TABLET, FILM COATED ORAL DAILY
Status: DISCONTINUED | OUTPATIENT
Start: 2020-04-14 | End: 2020-04-18 | Stop reason: HOSPADM

## 2020-04-14 RX ORDER — POLYETHYLENE GLYCOL 3350 17 G/17G
17 POWDER, FOR SOLUTION ORAL DAILY PRN
Status: DISCONTINUED | OUTPATIENT
Start: 2020-04-14 | End: 2020-04-18 | Stop reason: HOSPADM

## 2020-04-14 RX ORDER — HEPARIN SODIUM 5000 [USP'U]/ML
5000 INJECTION, SOLUTION INTRAVENOUS; SUBCUTANEOUS EVERY 8 HOURS SCHEDULED
Status: DISCONTINUED | OUTPATIENT
Start: 2020-04-14 | End: 2020-04-18 | Stop reason: HOSPADM

## 2020-04-14 RX ORDER — ALBUTEROL SULFATE 2.5 MG/3ML
2.5 SOLUTION RESPIRATORY (INHALATION) EVERY 4 HOURS PRN
Status: DISCONTINUED | OUTPATIENT
Start: 2020-04-14 | End: 2020-04-18 | Stop reason: HOSPADM

## 2020-04-14 RX ORDER — ASPIRIN 81 MG/1
81 TABLET ORAL DAILY
Status: DISCONTINUED | OUTPATIENT
Start: 2020-04-14 | End: 2020-04-18 | Stop reason: HOSPADM

## 2020-04-14 RX ORDER — ONDANSETRON 2 MG/ML
4 INJECTION INTRAMUSCULAR; INTRAVENOUS EVERY 6 HOURS PRN
Status: DISCONTINUED | OUTPATIENT
Start: 2020-04-14 | End: 2020-04-18 | Stop reason: HOSPADM

## 2020-04-14 RX ORDER — SODIUM CHLORIDE 0.9 % (FLUSH) 0.9 %
10 SYRINGE (ML) INJECTION EVERY 12 HOURS SCHEDULED
Status: DISCONTINUED | OUTPATIENT
Start: 2020-04-14 | End: 2020-04-18 | Stop reason: HOSPADM

## 2020-04-14 RX ORDER — DEXTROSE MONOHYDRATE 50 MG/ML
100 INJECTION, SOLUTION INTRAVENOUS PRN
Status: DISCONTINUED | OUTPATIENT
Start: 2020-04-14 | End: 2020-04-18 | Stop reason: HOSPADM

## 2020-04-14 RX ORDER — DEXTROSE MONOHYDRATE 25 G/50ML
12.5 INJECTION, SOLUTION INTRAVENOUS PRN
Status: DISCONTINUED | OUTPATIENT
Start: 2020-04-14 | End: 2020-04-18 | Stop reason: HOSPADM

## 2020-04-14 RX ORDER — ACETAMINOPHEN 325 MG/1
650 TABLET ORAL EVERY 6 HOURS PRN
Status: DISCONTINUED | OUTPATIENT
Start: 2020-04-14 | End: 2020-04-18 | Stop reason: HOSPADM

## 2020-04-14 RX ORDER — SODIUM CHLORIDE 0.9 % (FLUSH) 0.9 %
10 SYRINGE (ML) INJECTION PRN
Status: DISCONTINUED | OUTPATIENT
Start: 2020-04-14 | End: 2020-04-18 | Stop reason: HOSPADM

## 2020-04-14 RX ORDER — IPRATROPIUM BROMIDE AND ALBUTEROL SULFATE 2.5; .5 MG/3ML; MG/3ML
3 SOLUTION RESPIRATORY (INHALATION)
Status: DISCONTINUED | OUTPATIENT
Start: 2020-04-14 | End: 2020-04-14

## 2020-04-14 RX ORDER — SEVELAMER CARBONATE 800 MG/1
3200 TABLET, FILM COATED ORAL 3 TIMES DAILY
Status: DISCONTINUED | OUTPATIENT
Start: 2020-04-14 | End: 2020-04-18 | Stop reason: HOSPADM

## 2020-04-14 RX ORDER — NICOTINE POLACRILEX 4 MG
15 LOZENGE BUCCAL PRN
Status: DISCONTINUED | OUTPATIENT
Start: 2020-04-14 | End: 2020-04-18 | Stop reason: HOSPADM

## 2020-04-14 RX ORDER — AMIODARONE HYDROCHLORIDE 200 MG/1
100 TABLET ORAL DAILY
Status: DISCONTINUED | OUTPATIENT
Start: 2020-04-14 | End: 2020-04-18 | Stop reason: HOSPADM

## 2020-04-14 RX ORDER — CITALOPRAM 20 MG/1
20 TABLET ORAL DAILY
Status: DISCONTINUED | OUTPATIENT
Start: 2020-04-14 | End: 2020-04-18 | Stop reason: HOSPADM

## 2020-04-14 RX ORDER — TRAZODONE HYDROCHLORIDE 50 MG/1
150 TABLET ORAL NIGHTLY
Status: DISCONTINUED | OUTPATIENT
Start: 2020-04-14 | End: 2020-04-18 | Stop reason: HOSPADM

## 2020-04-14 RX ORDER — IPRATROPIUM BROMIDE AND ALBUTEROL SULFATE 2.5; .5 MG/3ML; MG/3ML
3 SOLUTION RESPIRATORY (INHALATION) EVERY 4 HOURS PRN
Status: DISCONTINUED | OUTPATIENT
Start: 2020-04-14 | End: 2020-04-18 | Stop reason: HOSPADM

## 2020-04-14 RX ORDER — METOPROLOL TARTRATE 50 MG/1
50 TABLET, FILM COATED ORAL 2 TIMES DAILY
Status: DISCONTINUED | OUTPATIENT
Start: 2020-04-14 | End: 2020-04-16

## 2020-04-14 RX ORDER — PROMETHAZINE HYDROCHLORIDE 25 MG/1
12.5 TABLET ORAL EVERY 6 HOURS PRN
Status: DISCONTINUED | OUTPATIENT
Start: 2020-04-14 | End: 2020-04-18 | Stop reason: HOSPADM

## 2020-04-14 RX ADMIN — METOPROLOL TARTRATE 50 MG: 50 TABLET, FILM COATED ORAL at 21:05

## 2020-04-14 RX ADMIN — PIPERACILLIN AND TAZOBACTAM 3.38 G: 3; .375 INJECTION, POWDER, FOR SOLUTION INTRAVENOUS at 16:02

## 2020-04-14 RX ADMIN — Medication 10 ML: at 21:06

## 2020-04-14 RX ADMIN — VANCOMYCIN HYDROCHLORIDE 1.5 G: 10 INJECTION, POWDER, LYOPHILIZED, FOR SOLUTION INTRAVENOUS at 16:02

## 2020-04-14 RX ADMIN — SEVELAMER CARBONATE 3200 MG: 800 TABLET, FILM COATED ORAL at 21:06

## 2020-04-14 RX ADMIN — TRAZODONE HYDROCHLORIDE 150 MG: 50 TABLET ORAL at 21:05

## 2020-04-14 RX ADMIN — HEPARIN SODIUM 5000 UNITS: 5000 INJECTION INTRAVENOUS; SUBCUTANEOUS at 21:34

## 2020-04-14 ASSESSMENT — PAIN SCALES - GENERAL: PAINLEVEL_OUTOF10: 0

## 2020-04-14 NOTE — ED NOTES
[email or web address]  Re: admit.  osteo right great toe, DFU per [email or web address] [address] [place] [address]  04/14/20 6102

## 2020-04-14 NOTE — ED NOTES
Bed: 11  Expected date:   Expected time:   Means of arrival:   Comments:  [facility], RN  04/14/20 2022

## 2020-04-14 NOTE — ED PROVIDER NOTES
on phone: None     Gets together: None     Attends Orthodoxy service: None     Active member of club or organization: None     Attends meetings of clubs or organizations: None     Relationship status: None    Intimate partner violence     Fear of current or ex partner: None     Emotionally abused: None     Physically abused: None     Forced sexual activity: None   Other Topics Concern    None   Social History Narrative    None       REVIEW OF SYSTEMS    10 systems reviewed, pertinent positives per HPI otherwise noted to be negative    PHYSICAL EXAM  Vitals:    04/14/20 1315   BP: (!) 107/58   Pulse: 60   Resp: 18   Temp: 97.7 °F (36.5 °C)   SpO2: 98%       GENERAL: Patient is well-developed, well-nourished. Awake and alert. Cooperative. Resting in bed. No apparent distress. HEENT:  Normocephalic, atraumatic. Conjunctiva appear normal. Sclera is non-icteric. External ears are normal.    NECK: Supple with normal ROM. Trachea midline. LUNGS: Equal and symmetric chest rise. Breathing is unlabored. Speaking comfortably in full sentences. Lungs are clear bilaterally to auscultation. Without wheezing, rales, or rhonchi. CADIOVASCULAR: Regular rate and rhythm. Normal S1-S2 sounds. No murmurs, rubs, or gallops. Capillary refill is brisk in all 4 extremities. Bilateral lower extremities are equal in size, there is no swelling observed. There is no tenderness to palpation. There is no erythema observed or warmth palpated. GI: Soft, nontender, nondistended with positive bowel sounds. No rebound tenderness, guarding or any peritoneal signs. No masses or hepatosplenomegaly    MUSCULOSKELETAL:  No gross deformities or trauma noted. Moving all extremities equally and appropriately. Normal ROM. SKIN: Warm/dry. Right great toe swollen with erythema, large area of necrotic slough tissue/eschar to the plantar aspect of the right great toe. This is approximately 6 cm in length by 4 cm in width.   There is no surrounding

## 2020-04-14 NOTE — CONSULTS
Pharmacy to Dose Vancomycin    Dx: TBD (Cellulitis vs osteo?)  Goal trough = > 10 mcg/mL or 15-20 mcg/mL  Pt wt = 102.1 Kg  Estimated Creatinine Clearance: 14 mL/min (A) (based on SCr of 6.5 mg/dL [facility])). Patient is on hemodialysis    Vancomycin 1500 mg IVPB x 1 in ED at 1600 today. Pharmacy will continue to dose and manage vancomycin if further consulted by inpatient hospitalist  Heather Steele PharmD  4/14/2020 at 3:24 PM    4/15  Dx: Osteomyelitis  Goal trough: 15-20 mcg/mL  Vanc rdm = 16.3 mcg/mL  Estimated Creatinine Clearance: 12 mL/min (A) (based on SCr of 7.7 mg/dL [facility])).   Dialysis scheduled today  Vancomycin 500 mg x1 today  Vanc rdm tomorrow AM to assess for HD facundo Mccoy PharmD  4/15/2020 at 9:58 AM
drainage. No purulence. The wound does not probe or undermine. No proximal red streaking. Otherwise, skin is warm, dry and supple, bilateral.   Neurologic:  Protective sensation is grossly diminished to light touch at the level of the toes, bilateral.  Vascular:  DP and PT pulses are palpable, bilateral.  CFT is less than five seconds at all toes. Mild edema at both lower extremities. Musculoskeletal:  Patient has 5/5 strength on inversion/everison/dorsiflexion/plantarflexion, bilateral.  No gross musculoskeletal deformities noted. Labs:  CBC with Differential:    Lab Results   Component Value Date    WBC 8.0 04/14/2020    RBC 3.95 04/14/2020    HGB 12.1 04/14/2020    HCT 37.4 04/14/2020     04/14/2020    MCV 94.8 04/14/2020    MCH 30.7 04/14/2020    MCHC 32.4 04/14/2020    RDW 15.3 04/14/2020    SEGSPCT 74.4 02/18/2013    BANDSPCT 1 10/04/2017    LYMPHOPCT 13.3 04/14/2020    MONOPCT 8.5 04/14/2020    EOSPCT 5.6 09/29/2010    BASOPCT 1.3 04/14/2020    MONOSABS 0.7 04/14/2020    LYMPHSABS 1.1 04/14/2020    EOSABS 0.6 04/14/2020    BASOSABS 0.1 04/14/2020    DIFFTYPE Auto 02/18/2013     CMP:    Lab Results   Component Value Date     04/14/2020    K 5.3 04/14/2020    CL 95 04/14/2020    CO2 28 04/14/2020    BUN 53 04/14/2020    CREATININE 6.5 04/14/2020    GFRAA 11 04/14/2020    GFRAA 22 02/22/2013    AGRATIO 1.0 04/14/2020    LABGLOM 9 04/14/2020    GLUCOSE 143 04/14/2020    PROT 7.5 04/14/2020    PROT 6.6 11/28/2012    LABALBU 3.8 04/14/2020    CALCIUM 9.8 04/14/2020    BILITOT <0.2 04/14/2020    ALKPHOS 109 04/14/2020    AST 16 04/14/2020    ALT 7 04/14/2020     PT/INR:    Lab Results   Component Value Date    PROTIME 12.3 09/12/2018    INR 1.08 09/12/2018     ESR 4/14/2020: 35    Imaging: Three Views, Right Foot 4/14/2020:    There is a soft tissue defect over the distal 1st digit with underlying   cortical destruction of the distal tuft consistent with osteomyelitis.  No   other definite

## 2020-04-14 NOTE — H&P
underlying   cortical destruction of the distal tuft consistent with osteomyelitis. No   other definite radiographic areas of osteomyelitis on limited evaluation due   to bony demineralization. Extensive vascular calcifications in the soft tissues. ASSESSMENT:    Active Hospital Problems    Diagnosis Date Noted    Osteomyelitis of great toe of right foot ([facility] [address] [M86.9] 04/14/2020         PLAN:    Right foot cellulitis, possible osteomyelitis  Admitted for suspicion of osteomyelitis and progressive severe symptoms. Podiatry consulted  Empiric antibiotics are started    End-stage renal disease on hemodialysis  Patient's nephrologist consulted. Continue hemodialysis while in the hospital.  Does not seem to be require urgent hemodialysis. Diabetes mellitus type 2  Appears diet controlled. Lack of sensation over the toe could be a precipitating factor for the toe wound. Check hemoglobin A1c  We will place the patient on sliding scale insulin with carb controlled diet for now. Admission blood glucose is 135    Paroxysmal atrial fibrillation  Currently sinus. 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 Anticoagulation not needed at this time. Dyslipidemia  Continue statin  I will check with panel. Discussed with the patient    Discussed with emergency room physician    DVT Prophylaxis: Heparin subcutaneous prophylactic dose  Diet: Carb controlled low-potassium diet  Code Status: Full code    PT/OT Eval Status: Not needed until seen and evaluated by podiatrist    Dispo -inpatient stay, 2 or 3 days. May increase if surgical procedure is needed. Mian Lal MD    Thank you Char Menchaca for the opportunity to be involved in this patient's care. If you have any questions or concerns please feel free to contact me at [phone].

## 2020-04-15 ENCOUNTER — APPOINTMENT (OUTPATIENT)
Dept: VASCULAR LAB | Age: 60
DRG: 255 | End: 2020-04-15
Payer: COMMERCIAL

## 2020-04-15 LAB
A/G RATIO: 0.9 (ref 1.1–2.2)
ALBUMIN SERPL-MCNC: 3.2 G/DL (ref 3.4–5)
ALP BLD-CCNC: 93 U/L (ref 40–129)
ALT SERPL-CCNC: 6 U/L (ref 10–40)
ANION GAP SERPL CALCULATED.3IONS-SCNC: 15 MMOL/L (ref 3–16)
AST SERPL-CCNC: 10 U/L (ref 15–37)
BASOPHILS ABSOLUTE: 0.1 K/UL (ref 0–0.2)
BASOPHILS RELATIVE PERCENT: 1.1 %
BILIRUB SERPL-MCNC: <0.2 MG/DL (ref 0–1)
BUN BLDV-MCNC: 69 MG/DL (ref 7–20)
CALCIUM SERPL-MCNC: 9.4 MG/DL (ref 8.3–10.6)
CHLORIDE BLD-SCNC: 98 MMOL/L (ref 99–110)
CO2: 25 MMOL/L (ref 21–32)
CREAT SERPL-MCNC: 7.7 MG/DL (ref 0.8–1.3)
EKG ATRIAL RATE: 53 BPM
EKG DIAGNOSIS: NORMAL
EKG P AXIS: 24 DEGREES
EKG P-R INTERVAL: 134 MS
EKG Q-T INTERVAL: 462 MS
EKG QRS DURATION: 68 MS
EKG QTC CALCULATION (BAZETT): 433 MS
EKG R AXIS: 17 DEGREES
EKG T AXIS: 22 DEGREES
EKG VENTRICULAR RATE: 53 BPM
EOSINOPHILS ABSOLUTE: 0.6 K/UL (ref 0–0.6)
EOSINOPHILS RELATIVE PERCENT: 8.3 %
ESTIMATED AVERAGE GLUCOSE: 111.2 MG/DL
GFR AFRICAN AMERICAN: 9
GFR NON-AFRICAN AMERICAN: 7
GLOBULIN: 3.5 G/DL
GLUCOSE BLD-MCNC: 101 MG/DL (ref 70–99)
GLUCOSE BLD-MCNC: 143 MG/DL (ref 70–99)
GLUCOSE BLD-MCNC: 91 MG/DL (ref 70–99)
HBA1C MFR BLD: 5.5 %
HCT VFR BLD CALC: 34.4 % (ref 40.5–52.5)
HEMOGLOBIN: 11 G/DL (ref 13.5–17.5)
LYMPHOCYTES ABSOLUTE: 1.2 K/UL (ref 1–5.1)
LYMPHOCYTES RELATIVE PERCENT: 17.2 %
MCH RBC QN AUTO: 30 PG (ref 26–34)
MCHC RBC AUTO-ENTMCNC: 32.1 G/DL (ref 31–36)
MCV RBC AUTO: 93.3 FL (ref 80–100)
MONOCYTES ABSOLUTE: 0.6 K/UL (ref 0–1.3)
MONOCYTES RELATIVE PERCENT: 8.7 %
NEUTROPHILS ABSOLUTE: 4.4 K/UL (ref 1.7–7.7)
NEUTROPHILS RELATIVE PERCENT: 64.7 %
PDW BLD-RTO: 15.1 % (ref 12.4–15.4)
PERFORMED ON: ABNORMAL
PERFORMED ON: ABNORMAL
PHOSPHORUS: 6 MG/DL (ref 2.5–4.9)
PLATELET # BLD: 268 K/UL (ref 135–450)
PMV BLD AUTO: 7.2 FL (ref 5–10.5)
POTASSIUM REFLEX MAGNESIUM: 5.8 MMOL/L (ref 3.5–5.1)
RBC # BLD: 3.68 M/UL (ref 4.2–5.9)
SODIUM BLD-SCNC: 138 MMOL/L (ref 136–145)
TOTAL PROTEIN: 6.7 G/DL (ref 6.4–8.2)
VANCOMYCIN RANDOM: 16.3 UG/ML
WBC # BLD: 6.8 K/UL (ref 4–11)

## 2020-04-15 PROCEDURE — 80053 COMPREHEN METABOLIC PANEL: CPT

## 2020-04-15 PROCEDURE — 2580000003 HC RX 258: Performed by: INTERNAL MEDICINE

## 2020-04-15 PROCEDURE — 1200000000 HC SEMI PRIVATE

## 2020-04-15 PROCEDURE — 83036 HEMOGLOBIN GLYCOSYLATED A1C: CPT

## 2020-04-15 PROCEDURE — 90935 HEMODIALYSIS ONE EVALUATION: CPT

## 2020-04-15 PROCEDURE — 5A1D70Z PERFORMANCE OF URINARY FILTRATION, INTERMITTENT, LESS THAN 6 HOURS PER DAY: ICD-10-PCS | Performed by: INTERNAL MEDICINE

## 2020-04-15 PROCEDURE — 80202 ASSAY OF VANCOMYCIN: CPT

## 2020-04-15 PROCEDURE — 36415 COLL VENOUS BLD VENIPUNCTURE: CPT

## 2020-04-15 PROCEDURE — 2700000000 HC OXYGEN THERAPY PER DAY

## 2020-04-15 PROCEDURE — 84100 ASSAY OF PHOSPHORUS: CPT

## 2020-04-15 PROCEDURE — 6370000000 HC RX 637 (ALT 250 FOR IP): Performed by: INTERNAL MEDICINE

## 2020-04-15 PROCEDURE — 2580000003 HC RX 258

## 2020-04-15 PROCEDURE — 93925 LOWER EXTREMITY STUDY: CPT

## 2020-04-15 PROCEDURE — 93010 ELECTROCARDIOGRAM REPORT: CPT | Performed by: INTERNAL MEDICINE

## 2020-04-15 PROCEDURE — 6360000002 HC RX W HCPCS: Performed by: INTERNAL MEDICINE

## 2020-04-15 PROCEDURE — 85025 COMPLETE CBC W/AUTO DIFF WBC: CPT

## 2020-04-15 PROCEDURE — 94761 N-INVAS EAR/PLS OXIMETRY MLT: CPT

## 2020-04-15 RX ORDER — SODIUM CHLORIDE 9 MG/ML
INJECTION, SOLUTION INTRAVENOUS
Status: COMPLETED
Start: 2020-04-15 | End: 2020-04-15

## 2020-04-15 RX ORDER — LIDOCAINE HYDROCHLORIDE 10 MG/ML
INJECTION, SOLUTION EPIDURAL; INFILTRATION; INTRACAUDAL; PERINEURAL
Status: DISPENSED
Start: 2020-04-15 | End: 2020-04-15

## 2020-04-15 RX ORDER — LIDOCAINE HYDROCHLORIDE 10 MG/ML
0.4 INJECTION, SOLUTION EPIDURAL; INFILTRATION; INTRACAUDAL; PERINEURAL ONCE
Status: DISCONTINUED | OUTPATIENT
Start: 2020-04-15 | End: 2020-04-18 | Stop reason: HOSPADM

## 2020-04-15 RX ADMIN — HEPARIN SODIUM 5000 UNITS: 5000 INJECTION INTRAVENOUS; SUBCUTANEOUS at 15:33

## 2020-04-15 RX ADMIN — ACETAMINOPHEN 650 MG: 325 TABLET ORAL at 15:01

## 2020-04-15 RX ADMIN — ATORVASTATIN CALCIUM 40 MG: 40 TABLET, FILM COATED ORAL at 15:26

## 2020-04-15 RX ADMIN — HEPARIN SODIUM 5000 UNITS: 5000 INJECTION INTRAVENOUS; SUBCUTANEOUS at 22:05

## 2020-04-15 RX ADMIN — ASPIRIN 81 MG: 81 TABLET, COATED ORAL at 15:25

## 2020-04-15 RX ADMIN — Medication 10 ML: at 22:23

## 2020-04-15 RX ADMIN — PIPERACILLIN AND TAZOBACTAM 2.25 G: 2; .25 INJECTION, POWDER, LYOPHILIZED, FOR SOLUTION INTRAVENOUS at 15:27

## 2020-04-15 RX ADMIN — AMIODARONE HYDROCHLORIDE 100 MG: 200 TABLET ORAL at 15:25

## 2020-04-15 RX ADMIN — INSULIN LISPRO 1 UNITS: 100 INJECTION, SOLUTION INTRAVENOUS; SUBCUTANEOUS at 22:11

## 2020-04-15 RX ADMIN — SEVELAMER CARBONATE 3200 MG: 800 TABLET, FILM COATED ORAL at 15:25

## 2020-04-15 RX ADMIN — CITALOPRAM HYDROBROMIDE 20 MG: 20 TABLET ORAL at 15:26

## 2020-04-15 RX ADMIN — SODIUM CHLORIDE 250 ML: 9 INJECTION, SOLUTION INTRAVENOUS at 01:55

## 2020-04-15 RX ADMIN — METOPROLOL TARTRATE 50 MG: 50 TABLET, FILM COATED ORAL at 15:25

## 2020-04-15 RX ADMIN — B-COMPLEX W/ C & FOLIC ACID TAB 1 TABLET: TAB at 15:25

## 2020-04-15 RX ADMIN — SEVELAMER CARBONATE 3200 MG: 800 TABLET, FILM COATED ORAL at 22:04

## 2020-04-15 RX ADMIN — PIPERACILLIN AND TAZOBACTAM 2.25 G: 2; .25 INJECTION, POWDER, LYOPHILIZED, FOR SOLUTION INTRAVENOUS at 22:04

## 2020-04-15 RX ADMIN — VANCOMYCIN HYDROCHLORIDE 500 MG: 500 INJECTION, POWDER, LYOPHILIZED, FOR SOLUTION INTRAVENOUS at 11:22

## 2020-04-15 RX ADMIN — PIPERACILLIN AND TAZOBACTAM 3.38 G: 3; .375 INJECTION, POWDER, LYOPHILIZED, FOR SOLUTION INTRAVENOUS at 01:55

## 2020-04-15 RX ADMIN — TRAZODONE HYDROCHLORIDE 150 MG: 50 TABLET ORAL at 22:04

## 2020-04-15 ASSESSMENT — PAIN SCALES - GENERAL
PAINLEVEL_OUTOF10: 0
PAINLEVEL_OUTOF10: 9

## 2020-04-15 NOTE — CARE COORDINATION
CASE MANAGEMENT INITIAL ASSESSMENT      Reviewed chart and completed assessment via telephone with: Spencer Mak Step daughter  Explained Case Management role/services. Primary contact information: Pt \"god son\"  [place] or Radene Knife Step daughter    Admit date/status: inpt 4/14/20  Diagnosis: Osteomyelitis of great toe of right foot  Is this a Readmission?: Pt has had numerous ED vissits in the last 2 months. PNA, SOB, and Fatigue. Insurance: [employer] [facility] required for SNF - Y        3 night stay required - Y    Living arrangements, Adls, care needs, prior to admission: Pt lives with his Step daughter, God Son, and his fiance in a trailer. Pt gets around the home with a cane and uses his w/c in the community. Family helps him with any needs he has. Transportation: [address] at home: Walker__Cane_x_RTS__ BSC__Shower Chair__  02_xcorner STOne 2LTR _ HHN__ CPAP__  BiPap__  Hospital Bed__ W/C__x_ Other__________    Services in the home and/or outpatient, prior to admission: HX of Stays at [facility]. Pt and family said they are open to the Pt going there after surgery to increase his strength. [facility]ialysis Facility (if applicable)   · Karlos Giordano    · Address:  · Dialysis Schedule: M-W F 5:55pm  · Phone: [phone]  · SFN:[id number]    PT/OT recs: none noted at this time. Hospital Exemption Notification (HEN): if SNF is needed    Barriers to discharge: none    Plan/comments: Plan for the Pt is to go to [facility] to get some therapy until he returns home, if approriate. Pt lives with family in his trailer and they offer all assistance when he returns home home.   CATHERINE Hart on chart for MD signature

## 2020-04-15 NOTE — PROGRESS NOTES
Podiatry Progress Note  Subjective:   Patient seen at bedside this afternoon. No new pedal complaints. He denied n/v/f/c and SOB. Objective:   Vitals:  /60   Pulse 65   Temp 98 °F (36.7 °C) (Oral)   Resp 16   Ht 5' 10\" (1.778 m)   Wt 225 lb 8.5 oz (102.3 kg)   SpO2 96%   BMI 32.36 kg/m²   Integument:  Eschar at most of the plantar right hallux. There is some surrounding erythema. No active drainage. No purulence. The wound does not probe or undermine. No proximal red streaking. Otherwise, skin is warm, dry and supple, bilateral.   Neurologic:  Protective sensation is grossly diminished to light touch at the level of the toes, bilateral.  Vascular:  DP and PT pulses are palpable, bilateral.  CFT is less than five seconds at all toes. Mild edema at both lower extremities. Musculoskeletal:  Patient has 5/5 strength on inversion/everison/dorsiflexion/plantarflexion, bilateral.  No gross musculoskeletal deformities noted.     Labs:   CBC with Differential:    Lab Results   Component Value Date    WBC 6.8 04/15/2020    RBC 3.68 04/15/2020    HGB 11.0 04/15/2020    HCT 34.4 04/15/2020     04/15/2020    MCV 93.3 04/15/2020    MCH 30.0 04/15/2020    MCHC 32.1 04/15/2020    RDW 15.1 04/15/2020    SEGSPCT 74.4 02/18/2013    BANDSPCT 1 10/04/2017    LYMPHOPCT 17.2 04/15/2020    MONOPCT 8.7 04/15/2020    EOSPCT 5.6 09/29/2010    BASOPCT 1.1 04/15/2020    MONOSABS 0.6 04/15/2020    LYMPHSABS 1.2 04/15/2020    EOSABS 0.6 04/15/2020    BASOSABS 0.1 04/15/2020    DIFFTYPE Auto 02/18/2013     CMP:    Lab Results   Component Value Date     04/15/2020    K 5.8 04/15/2020    CL 98 04/15/2020    CO2 25 04/15/2020    BUN 69 04/15/2020    CREATININE 7.7 04/15/2020    GFRAA 9 04/15/2020    GFRAA 22 02/22/2013    AGRATIO 0.9 04/15/2020    LABGLOM 7 04/15/2020    GLUCOSE 91 04/15/2020    PROT 6.7 04/15/2020    PROT 6.6 11/28/2012    LABALBU 3.2 04/15/2020    CALCIUM 9.4 04/15/2020    BILITOT <0.2 04/15/2020    ALKPHOS 93 04/15/2020    AST 10 04/15/2020    ALT 6 04/15/2020     PT/INR:    Lab Results   Component Value Date    PROTIME 12.3 09/12/2018    INR 1.08 09/12/2018     Imaging: Three Views, Right Foot 4/14/2020: There is a soft tissue defect over the distal 1st digit with underlying   cortical destruction of the distal tuft consistent with osteomyelitis.  No   other definite radiographic areas of osteomyelitis on limited evaluation due   to bony demineralization.       Extensive vascular calcifications in the soft tissues.             Vascular:   Arterial Duplex 4/15/2020:  Tech Comments   Right   The right ankle/brachial index is unobtainable due to non compressible   vessels. There is normal triphasic flow at the common femoral artery indicating no   significant aorto-iliac inflow disease. No focal plaque or lesions are seen involving the arteries of the lower   extremity. Multiphasic flow is seen throughout the lower extremity. Left   The left ankle/brachial index is unobtainable due to non compressible vessels. There is normal triphasic flow at the common femoral artery indicating no   significant aorto-iliac inflow disease. There is atherosclerotic plaque seen within the posterior tibial artery   consistent with <50% stenosis. No other focal plaque or lesions are seen involving the arteries of the lower   extremity. Multiphasic flow is seen throughout the lower extremity.        Assessment/Plan:   The patient is a pleasant 62year old gentleman with:  1) Osteomyelitis, right hallux  - Continue antibiotics per Primary Team  - Amputation of right hallux scheduled for Friday 7:30AM  2) DM2 with neuropathy  3) DM2 with gangrene  - Reviewed vascular studies which demonstrate adequate flow to heal  4) ESRD      Kristian Hill, DPBINU, EMA, [address]  Office: [phone]  Cell: [phone]

## 2020-04-15 NOTE — PROGRESS NOTES
non-focal.  Psychiatric:  Alert and oriented, thought content appropriate, normal insight  Capillary Refill: Brisk,< 3 seconds   Peripheral Pulses: +2 palpable, equal bilaterally     Assessment/Plan:    Active Hospital Problems    Diagnosis    Osteomyelitis of great toe of right foot ([facility] [address] [M86.9]     Right foot cellulitis and Osteomyelitis  Podiatry consulted  Vascular studies limited but sufficient  Plan for amputation Friday  Continue empiric Abx  Pain control    Chronic respiratory failure with hypoxia: 2/2 CHF, ESRD. Continue O2 as needed. End-stage renal disease on hemodialysis  Nephrologist consulted. Continue hemodialysis while in the hospital.    Diabetes mellitus type 2, controlled  Appears diet controlled. Continue SSI    Paroxysmal atrial fibrillation  Currently sinus. Had a watchman device in the past, in good position. Anticoagulation not needed at this time.       DVT Prophylaxis: Heparin subcutaneous prophylactic dose  Diet: DIET CARB CONTROL; Low Potassium  Code Status: Full Code  PT/OT Eval Status: Pending course    Dispo - 2-3 days     Taylor Larsen MD

## 2020-04-15 NOTE — PROGRESS NOTES
RESPIRATORY THERAPY ASSESSMENT    Name:  Ervin Luevano Record Number:  [id number]  Age: 62 y.o. Gender: male  : 1960  Today's Date:  2020  Room:  0530/0530-01    Assessment     Is the patient being admitted for a COPD or Asthma exacerbation? No   (If yes the patient will be seen every 4 hours for the first 24 hours and then reassessed)    Patient Admission Diagnosis      Allergies  Allergies   Allergen Reactions    Bee Pollen     Codeine Swelling    Rosiglitazone      Other reaction(s): Congestive heart failure       Minimum Predicted Vital Capacity:     na          Actual Vital Capacity:      na              Pulmonary History:COPD  Home Oxygen Therapy:  2 liters/min via nasal cannula  Home Respiratory Therapy:Albuterol PRN  Current Respiratory Therapy:  Duoneb PRN          Respiratory Severity Index(RSI)   Patients with orders for inhalation medications, oxygen, or any therapeutic treatment modality will be placed on Respiratory Protocol. They will be assessed with the first treatment and at least every 72 hours thereafter. The following severity scale will be used to determine frequency of treatment intervention. Smoking History: Smoking History Less than 1ppd or less than 15 pack year = 1    Social History  Social History     Tobacco Use    Smoking status: Former Smoker     Packs/day: 1.50     Years: 3.00     Pack years: 4.50     Types: Cigarettes     Last attempt to quit: 1977     Years since quittin.0    Smokeless tobacco: Never Used   Substance Use Topics    Alcohol use: No     Alcohol/week: 0.0 standard drinks    Drug use: No       Recent Surgical History: Surgery of Extremities = 1  Past Surgical History  Past Surgical History:   Procedure Laterality Date    BRONCHOSCOPY  10/10/2012    COLONOSCOPY  2012    3 cold snared polyps, esophagus BX for Barretts.     DIALYSIS FISTULA CREATION Right     INSERTABLE CARDIAC MONITOR  2018    WATCHMAN PROCEDURE Medications:    1. If the patient lacks prior history of lung disease, is not using inhaled anti-inflammatory medication at home, and lacks wheezing by examination or by history for at least 24 hours, contact physician for possible discontinuation.

## 2020-04-16 LAB
GLUCOSE BLD-MCNC: 126 MG/DL (ref 70–99)
GLUCOSE BLD-MCNC: 126 MG/DL (ref 70–99)
GLUCOSE BLD-MCNC: 199 MG/DL (ref 70–99)
GLUCOSE BLD-MCNC: 89 MG/DL (ref 70–99)
PERFORMED ON: ABNORMAL
PERFORMED ON: NORMAL
VANCOMYCIN RANDOM: 15.3 UG/ML

## 2020-04-16 PROCEDURE — 94761 N-INVAS EAR/PLS OXIMETRY MLT: CPT

## 2020-04-16 PROCEDURE — 94640 AIRWAY INHALATION TREATMENT: CPT

## 2020-04-16 PROCEDURE — 1200000000 HC SEMI PRIVATE

## 2020-04-16 PROCEDURE — 2700000000 HC OXYGEN THERAPY PER DAY

## 2020-04-16 PROCEDURE — 6370000000 HC RX 637 (ALT 250 FOR IP): Performed by: INTERNAL MEDICINE

## 2020-04-16 PROCEDURE — 6360000002 HC RX W HCPCS: Performed by: INTERNAL MEDICINE

## 2020-04-16 PROCEDURE — 2580000003 HC RX 258: Performed by: INTERNAL MEDICINE

## 2020-04-16 PROCEDURE — 36415 COLL VENOUS BLD VENIPUNCTURE: CPT

## 2020-04-16 PROCEDURE — 80202 ASSAY OF VANCOMYCIN: CPT

## 2020-04-16 RX ADMIN — METOPROLOL TARTRATE 50 MG: 50 TABLET, FILM COATED ORAL at 08:05

## 2020-04-16 RX ADMIN — ACETAMINOPHEN 650 MG: 325 TABLET ORAL at 07:24

## 2020-04-16 RX ADMIN — ASPIRIN 81 MG: 81 TABLET, COATED ORAL at 08:05

## 2020-04-16 RX ADMIN — HEPARIN SODIUM 5000 UNITS: 5000 INJECTION INTRAVENOUS; SUBCUTANEOUS at 22:03

## 2020-04-16 RX ADMIN — HEPARIN SODIUM 5000 UNITS: 5000 INJECTION INTRAVENOUS; SUBCUTANEOUS at 05:50

## 2020-04-16 RX ADMIN — PIPERACILLIN AND TAZOBACTAM 2.25 G: 2; .25 INJECTION, POWDER, LYOPHILIZED, FOR SOLUTION INTRAVENOUS at 21:59

## 2020-04-16 RX ADMIN — PIPERACILLIN AND TAZOBACTAM 2.25 G: 2; .25 INJECTION, POWDER, LYOPHILIZED, FOR SOLUTION INTRAVENOUS at 13:00

## 2020-04-16 RX ADMIN — CITALOPRAM HYDROBROMIDE 20 MG: 20 TABLET ORAL at 08:05

## 2020-04-16 RX ADMIN — Medication 10 ML: at 21:59

## 2020-04-16 RX ADMIN — Medication 10 ML: at 08:06

## 2020-04-16 RX ADMIN — SEVELAMER CARBONATE 3200 MG: 800 TABLET, FILM COATED ORAL at 21:58

## 2020-04-16 RX ADMIN — B-COMPLEX W/ C & FOLIC ACID TAB 1 TABLET: TAB at 08:05

## 2020-04-16 RX ADMIN — ONDANSETRON 4 MG: 2 INJECTION INTRAMUSCULAR; INTRAVENOUS at 07:24

## 2020-04-16 RX ADMIN — SEVELAMER CARBONATE 3200 MG: 800 TABLET, FILM COATED ORAL at 08:05

## 2020-04-16 RX ADMIN — ATORVASTATIN CALCIUM 40 MG: 40 TABLET, FILM COATED ORAL at 08:05

## 2020-04-16 RX ADMIN — ALBUTEROL SULFATE 2.5 MG: 2.5 SOLUTION RESPIRATORY (INHALATION) at 08:44

## 2020-04-16 RX ADMIN — SEVELAMER CARBONATE 3200 MG: 800 TABLET, FILM COATED ORAL at 14:57

## 2020-04-16 RX ADMIN — PIPERACILLIN AND TAZOBACTAM 2.25 G: 2; .25 INJECTION, POWDER, LYOPHILIZED, FOR SOLUTION INTRAVENOUS at 05:54

## 2020-04-16 RX ADMIN — TRAZODONE HYDROCHLORIDE 150 MG: 50 TABLET ORAL at 21:58

## 2020-04-16 RX ADMIN — HEPARIN SODIUM 5000 UNITS: 5000 INJECTION INTRAVENOUS; SUBCUTANEOUS at 14:57

## 2020-04-16 RX ADMIN — METOPROLOL TARTRATE 25 MG: 25 TABLET, FILM COATED ORAL at 21:58

## 2020-04-16 RX ADMIN — INSULIN LISPRO 2 UNITS: 100 INJECTION, SOLUTION INTRAVENOUS; SUBCUTANEOUS at 12:59

## 2020-04-16 RX ADMIN — AMIODARONE HYDROCHLORIDE 100 MG: 200 TABLET ORAL at 08:05

## 2020-04-16 ASSESSMENT — PAIN SCALES - GENERAL: PAINLEVEL_OUTOF10: 3

## 2020-04-16 NOTE — PROGRESS NOTES
Hospitalist Progress Note    Date of Admission: 4/14/2020    Chief Complaint: Right toe pain    Hospital Course: 56 y.o. male who presented to [facility] with right great toe pain that started about a couple weeks ago, following toenails treatment. Patient is blind. Subjective: Reports improved right foot pain. He is anxious about the procedure tomorrow. No nausea or vomiting. Labs:   Recent Labs     04/14/20  1353 04/15/20  0825   WBC 8.0 6.8   HGB 12.1* 11.0*   HCT 37.4* 34.4*    268     Recent Labs     04/14/20  1353 04/15/20  0825    138   K 5.3* 5.8*   CL 95* 98*   CO2 28 25   BUN 53* 69*   CREATININE 6.5* 7.7*   CALCIUM 9.8 9.4   PHOS  --  6.0*     Recent Labs     04/14/20  1353 04/15/20  0825   AST 16 10*   ALT 7* 6*   BILITOT <0.2 <0.2   ALKPHOS 109 93     No results for input(s): INR in the last 72 hours. Physical Exam Performed:    BP 98/63   Pulse 54   Temp 97.5 °F (36.4 °C)   Resp 16   Ht 5' 10\" (1.778 m)   Wt 225 lb 8.5 oz (102.3 kg)   SpO2 99%   BMI 32.36 kg/m²     General appearance:  No apparent distress, appears stated age and cooperative. HEENT:  Normal cephalic, atraumatic without obvious deformity. Pupils equal, round. No response to light. Patient is blind. Conjunctivae/corneas clear. Neck: Supple, with full range of motion. No jugular venous distention. Trachea midline. Respiratory:  Normal respiratory effort. Clear to auscultation, bilaterally without Rales/Wheezes/Rhonchi. Cardiovascular:  Regular rate and rhythm with normal S1/S2 without murmurs, rubs or gallops. Abdomen: Soft, non-tender, non-distended with normal bowel sounds. Musculoskeletal:  No clubbing, cyanosis or edema bilaterally. Full range of motion without deformity. Skin: Skin color, texture, turgor normal.  Right great toe appears erythematous with reduced sensation.  Plantar side of the right great toe is necrotic   Neurologic:  Neurovascularly intact without any focal

## 2020-04-16 NOTE — PLAN OF CARE
Problem: Falls - Risk of:  Goal: Will remain free from falls  Outcome: Ongoing    . Willa Godoy Bed in lowest position, wheels locked, 2/4 side rails up, nonskid footwear on. Bed/ chair check alarm in place, call light within reach. Pt instructed to call out when needing assistance. Pt stated understanding. Nurse will continue to monitor. Problem:  Activity:  Goal: Risk for activity intolerance will decrease  Outcome: Ongoing     Problem: Fluid Volume:  Goal: Will show no signs or symptoms of fluid imbalance  Outcome: Ongoing

## 2020-04-17 ENCOUNTER — ANESTHESIA (OUTPATIENT)
Dept: OPERATING ROOM | Age: 60
DRG: 255 | End: 2020-04-17
Payer: COMMERCIAL

## 2020-04-17 ENCOUNTER — ANESTHESIA EVENT (OUTPATIENT)
Dept: OPERATING ROOM | Age: 60
DRG: 255 | End: 2020-04-17
Payer: COMMERCIAL

## 2020-04-17 VITALS
RESPIRATION RATE: 3 BRPM | OXYGEN SATURATION: 93 % | SYSTOLIC BLOOD PRESSURE: 87 MMHG | DIASTOLIC BLOOD PRESSURE: 47 MMHG

## 2020-04-17 LAB
ALBUMIN SERPL-MCNC: 2.9 G/DL (ref 3.4–5)
ANION GAP SERPL CALCULATED.3IONS-SCNC: 12 MMOL/L (ref 3–16)
BUN BLDV-MCNC: 56 MG/DL (ref 7–20)
CALCIUM SERPL-MCNC: 8.6 MG/DL (ref 8.3–10.6)
CHLORIDE BLD-SCNC: 92 MMOL/L (ref 99–110)
CO2: 26 MMOL/L (ref 21–32)
CREAT SERPL-MCNC: 7 MG/DL (ref 0.8–1.3)
GFR AFRICAN AMERICAN: 10
GFR NON-AFRICAN AMERICAN: 8
GLUCOSE BLD-MCNC: 136 MG/DL (ref 70–99)
GLUCOSE BLD-MCNC: 90 MG/DL (ref 70–99)
GLUCOSE BLD-MCNC: 92 MG/DL (ref 70–99)
HCT VFR BLD CALC: 33.3 % (ref 40.5–52.5)
HEMOGLOBIN: 10.8 G/DL (ref 13.5–17.5)
MCH RBC QN AUTO: 30.4 PG (ref 26–34)
MCHC RBC AUTO-ENTMCNC: 32.4 G/DL (ref 31–36)
MCV RBC AUTO: 94 FL (ref 80–100)
PDW BLD-RTO: 14.9 % (ref 12.4–15.4)
PERFORMED ON: ABNORMAL
PERFORMED ON: NORMAL
PHOSPHORUS: 6.3 MG/DL (ref 2.5–4.9)
PLATELET # BLD: 241 K/UL (ref 135–450)
PMV BLD AUTO: 7.1 FL (ref 5–10.5)
POTASSIUM SERPL-SCNC: 5.6 MMOL/L (ref 3.5–5.1)
RBC # BLD: 3.55 M/UL (ref 4.2–5.9)
SODIUM BLD-SCNC: 130 MMOL/L (ref 136–145)
VANCOMYCIN RANDOM: 14.5 UG/ML
WBC # BLD: 6 K/UL (ref 4–11)

## 2020-04-17 PROCEDURE — 80202 ASSAY OF VANCOMYCIN: CPT

## 2020-04-17 PROCEDURE — 6370000000 HC RX 637 (ALT 250 FOR IP): Performed by: INTERNAL MEDICINE

## 2020-04-17 PROCEDURE — 6370000000 HC RX 637 (ALT 250 FOR IP): Performed by: PODIATRIST

## 2020-04-17 PROCEDURE — 87102 FUNGUS ISOLATION CULTURE: CPT

## 2020-04-17 PROCEDURE — 87206 SMEAR FLUORESCENT/ACID STAI: CPT

## 2020-04-17 PROCEDURE — 2580000003 HC RX 258: Performed by: PODIATRIST

## 2020-04-17 PROCEDURE — 90935 HEMODIALYSIS ONE EVALUATION: CPT

## 2020-04-17 PROCEDURE — 80069 RENAL FUNCTION PANEL: CPT

## 2020-04-17 PROCEDURE — 6360000002 HC RX W HCPCS: Performed by: INTERNAL MEDICINE

## 2020-04-17 PROCEDURE — 3700000001 HC ADD 15 MINUTES (ANESTHESIA): Performed by: PODIATRIST

## 2020-04-17 PROCEDURE — 3600000002 HC SURGERY LEVEL 2 BASE: Performed by: PODIATRIST

## 2020-04-17 PROCEDURE — 6360000002 HC RX W HCPCS: Performed by: NURSE ANESTHETIST, CERTIFIED REGISTERED

## 2020-04-17 PROCEDURE — 86403 PARTICLE AGGLUT ANTBDY SCRN: CPT

## 2020-04-17 PROCEDURE — 7100000001 HC PACU RECOVERY - ADDTL 15 MIN: Performed by: PODIATRIST

## 2020-04-17 PROCEDURE — 2709999900 HC NON-CHARGEABLE SUPPLY: Performed by: PODIATRIST

## 2020-04-17 PROCEDURE — 87077 CULTURE AEROBIC IDENTIFY: CPT

## 2020-04-17 PROCEDURE — 87116 MYCOBACTERIA CULTURE: CPT

## 2020-04-17 PROCEDURE — 2580000003 HC RX 258: Performed by: INTERNAL MEDICINE

## 2020-04-17 PROCEDURE — 87015 SPECIMEN INFECT AGNT CONCNTJ: CPT

## 2020-04-17 PROCEDURE — 1200000000 HC SEMI PRIVATE

## 2020-04-17 PROCEDURE — 85027 COMPLETE CBC AUTOMATED: CPT

## 2020-04-17 PROCEDURE — 3600000012 HC SURGERY LEVEL 2 ADDTL 15MIN: Performed by: PODIATRIST

## 2020-04-17 PROCEDURE — 87205 SMEAR GRAM STAIN: CPT

## 2020-04-17 PROCEDURE — 2500000003 HC RX 250 WO HCPCS: Performed by: NURSE ANESTHETIST, CERTIFIED REGISTERED

## 2020-04-17 PROCEDURE — 2500000003 HC RX 250 WO HCPCS: Performed by: PODIATRIST

## 2020-04-17 PROCEDURE — 6360000002 HC RX W HCPCS: Performed by: PODIATRIST

## 2020-04-17 PROCEDURE — 7100000000 HC PACU RECOVERY - FIRST 15 MIN: Performed by: PODIATRIST

## 2020-04-17 PROCEDURE — 88311 DECALCIFY TISSUE: CPT

## 2020-04-17 PROCEDURE — 3700000000 HC ANESTHESIA ATTENDED CARE: Performed by: PODIATRIST

## 2020-04-17 PROCEDURE — 87070 CULTURE OTHR SPECIMN AEROBIC: CPT

## 2020-04-17 PROCEDURE — 88305 TISSUE EXAM BY PATHOLOGIST: CPT

## 2020-04-17 PROCEDURE — 87186 SC STD MICRODIL/AGAR DIL: CPT

## 2020-04-17 PROCEDURE — 2580000003 HC RX 258: Performed by: NURSE ANESTHETIST, CERTIFIED REGISTERED

## 2020-04-17 RX ORDER — MIDAZOLAM HYDROCHLORIDE 5 MG/ML
INJECTION INTRAMUSCULAR; INTRAVENOUS PRN
Status: DISCONTINUED | OUTPATIENT
Start: 2020-04-17 | End: 2020-04-17 | Stop reason: SDUPTHER

## 2020-04-17 RX ORDER — PROMETHAZINE HYDROCHLORIDE 25 MG/ML
6.25 INJECTION, SOLUTION INTRAMUSCULAR; INTRAVENOUS
Status: DISCONTINUED | OUTPATIENT
Start: 2020-04-17 | End: 2020-04-17

## 2020-04-17 RX ORDER — MIDODRINE HYDROCHLORIDE 5 MG/1
5 TABLET ORAL PRN
Status: DISCONTINUED | OUTPATIENT
Start: 2020-04-17 | End: 2020-04-18 | Stop reason: HOSPADM

## 2020-04-17 RX ORDER — MORPHINE SULFATE 2 MG/ML
2 INJECTION, SOLUTION INTRAMUSCULAR; INTRAVENOUS EVERY 5 MIN PRN
Status: DISCONTINUED | OUTPATIENT
Start: 2020-04-17 | End: 2020-04-17

## 2020-04-17 RX ORDER — OXYCODONE HYDROCHLORIDE AND ACETAMINOPHEN 5; 325 MG/1; MG/1
1 TABLET ORAL PRN
Status: DISCONTINUED | OUTPATIENT
Start: 2020-04-17 | End: 2020-04-17

## 2020-04-17 RX ORDER — 0.9 % SODIUM CHLORIDE 0.9 %
250 INTRAVENOUS SOLUTION INTRAVENOUS ONCE
Status: DISCONTINUED | OUTPATIENT
Start: 2020-04-17 | End: 2020-04-18

## 2020-04-17 RX ORDER — LABETALOL HYDROCHLORIDE 5 MG/ML
5 INJECTION, SOLUTION INTRAVENOUS EVERY 10 MIN PRN
Status: DISCONTINUED | OUTPATIENT
Start: 2020-04-17 | End: 2020-04-17

## 2020-04-17 RX ORDER — EPHEDRINE SULFATE 50 MG/ML
INJECTION, SOLUTION INTRAVENOUS PRN
Status: DISCONTINUED | OUTPATIENT
Start: 2020-04-17 | End: 2020-04-17 | Stop reason: SDUPTHER

## 2020-04-17 RX ORDER — FENTANYL CITRATE 50 UG/ML
INJECTION, SOLUTION INTRAMUSCULAR; INTRAVENOUS PRN
Status: DISCONTINUED | OUTPATIENT
Start: 2020-04-17 | End: 2020-04-17 | Stop reason: SDUPTHER

## 2020-04-17 RX ORDER — ONDANSETRON 2 MG/ML
4 INJECTION INTRAMUSCULAR; INTRAVENOUS PRN
Status: DISCONTINUED | OUTPATIENT
Start: 2020-04-17 | End: 2020-04-17

## 2020-04-17 RX ORDER — OXYCODONE HYDROCHLORIDE AND ACETAMINOPHEN 5; 325 MG/1; MG/1
2 TABLET ORAL PRN
Status: DISCONTINUED | OUTPATIENT
Start: 2020-04-17 | End: 2020-04-17

## 2020-04-17 RX ORDER — SODIUM CHLORIDE 9 MG/ML
INJECTION, SOLUTION INTRAVENOUS CONTINUOUS PRN
Status: DISCONTINUED | OUTPATIENT
Start: 2020-04-17 | End: 2020-04-17 | Stop reason: SDUPTHER

## 2020-04-17 RX ORDER — MORPHINE SULFATE 2 MG/ML
1 INJECTION, SOLUTION INTRAMUSCULAR; INTRAVENOUS EVERY 5 MIN PRN
Status: DISCONTINUED | OUTPATIENT
Start: 2020-04-17 | End: 2020-04-17

## 2020-04-17 RX ORDER — BUPIVACAINE HYDROCHLORIDE 5 MG/ML
INJECTION, SOLUTION EPIDURAL; INTRACAUDAL PRN
Status: DISCONTINUED | OUTPATIENT
Start: 2020-04-17 | End: 2020-04-17 | Stop reason: ALTCHOICE

## 2020-04-17 RX ORDER — HYDRALAZINE HYDROCHLORIDE 20 MG/ML
5 INJECTION INTRAMUSCULAR; INTRAVENOUS EVERY 10 MIN PRN
Status: DISCONTINUED | OUTPATIENT
Start: 2020-04-17 | End: 2020-04-17

## 2020-04-17 RX ORDER — DIPHENHYDRAMINE HYDROCHLORIDE 50 MG/ML
12.5 INJECTION INTRAMUSCULAR; INTRAVENOUS
Status: DISCONTINUED | OUTPATIENT
Start: 2020-04-17 | End: 2020-04-17

## 2020-04-17 RX ORDER — LIDOCAINE HYDROCHLORIDE 10 MG/ML
0.2 INJECTION, SOLUTION INFILTRATION; PERINEURAL
Status: DISCONTINUED | OUTPATIENT
Start: 2020-04-20 | End: 2020-04-18 | Stop reason: HOSPADM

## 2020-04-17 RX ADMIN — B-COMPLEX W/ C & FOLIC ACID TAB 1 TABLET: TAB at 15:44

## 2020-04-17 RX ADMIN — MIDAZOLAM HYDROCHLORIDE 2 MG: 5 INJECTION, SOLUTION INTRAMUSCULAR; INTRAVENOUS at 07:38

## 2020-04-17 RX ADMIN — PIPERACILLIN AND TAZOBACTAM 2.25 G: 2; .25 INJECTION, POWDER, LYOPHILIZED, FOR SOLUTION INTRAVENOUS at 16:52

## 2020-04-17 RX ADMIN — VANCOMYCIN HYDROCHLORIDE 750 MG: 10 INJECTION, POWDER, LYOPHILIZED, FOR SOLUTION INTRAVENOUS at 11:53

## 2020-04-17 RX ADMIN — HEPARIN SODIUM 5000 UNITS: 5000 INJECTION INTRAVENOUS; SUBCUTANEOUS at 21:09

## 2020-04-17 RX ADMIN — MIDODRINE HYDROCHLORIDE 5 MG: 5 TABLET ORAL at 12:06

## 2020-04-17 RX ADMIN — CITALOPRAM HYDROBROMIDE 20 MG: 20 TABLET ORAL at 15:44

## 2020-04-17 RX ADMIN — SEVELAMER CARBONATE 3200 MG: 800 TABLET, FILM COATED ORAL at 20:59

## 2020-04-17 RX ADMIN — Medication 10 ML: at 21:01

## 2020-04-17 RX ADMIN — HEPARIN SODIUM 5000 UNITS: 5000 INJECTION INTRAVENOUS; SUBCUTANEOUS at 15:48

## 2020-04-17 RX ADMIN — EPHEDRINE SULFATE 5 MG: 50 INJECTION INTRAMUSCULAR; INTRAVENOUS; SUBCUTANEOUS at 07:53

## 2020-04-17 RX ADMIN — TRAZODONE HYDROCHLORIDE 150 MG: 50 TABLET ORAL at 20:58

## 2020-04-17 RX ADMIN — FENTANYL CITRATE 50 MCG: 50 INJECTION INTRAMUSCULAR; INTRAVENOUS at 07:50

## 2020-04-17 RX ADMIN — ASPIRIN 81 MG: 81 TABLET, COATED ORAL at 15:44

## 2020-04-17 RX ADMIN — ATORVASTATIN CALCIUM 40 MG: 40 TABLET, FILM COATED ORAL at 15:44

## 2020-04-17 RX ADMIN — SODIUM CHLORIDE: 9 INJECTION, SOLUTION INTRAVENOUS at 07:30

## 2020-04-17 RX ADMIN — SEVELAMER CARBONATE 3200 MG: 800 TABLET, FILM COATED ORAL at 15:45

## 2020-04-17 RX ADMIN — FENTANYL CITRATE 50 MCG: 50 INJECTION INTRAMUSCULAR; INTRAVENOUS at 07:38

## 2020-04-17 ASSESSMENT — PULMONARY FUNCTION TESTS
PIF_VALUE: 0
PIF_VALUE: 1
PIF_VALUE: 0
PIF_VALUE: 0
PIF_VALUE: 1
PIF_VALUE: 0
PIF_VALUE: 1
PIF_VALUE: 0
PIF_VALUE: 0
PIF_VALUE: 1
PIF_VALUE: 0
PIF_VALUE: 2
PIF_VALUE: 0

## 2020-04-17 ASSESSMENT — PAIN SCALES - GENERAL
PAINLEVEL_OUTOF10: 0

## 2020-04-17 NOTE — PROGRESS NOTES
Department of Internal Medicine  Nephrology Progress Note        SUBJECTIVE:    We are following this patient for ESRD management. The patient was seen and examined during dialysis; he was resting comfortably with no acute decompensation noted. ROS: No fever or chills. Social: No family at bedside. Physical Exam:    VITALS:  /63   Pulse 55   Temp 97.8 °F (36.6 °C)   Resp 18   Ht 5' 10\" (1.778 m)   Wt 225 lb (102.1 kg)   SpO2 97%   BMI 32.28 kg/m²     General appearance: Seems comfortable, no acute distress. Neck: Trachea midline, thyroid normal.   Lungs:  Non labored breathing, CTA to anterior auscultation. Heart:  S1S2 normal, rub or gallop. No peripheral edema. Abdomen: Soft, non-tender, no organomegaly. Skin: No lesions or rashes, warm to touch. DATA:    CBC:   Lab Results   Component Value Date    WBC 6.0 04/17/2020    RBC 3.55 04/17/2020    HGB 10.8 04/17/2020    HCT 33.3 04/17/2020    MCV 94.0 04/17/2020    MCH 30.4 04/17/2020    MCHC 32.4 04/17/2020    RDW 14.9 04/17/2020     04/17/2020    MPV 7.1 04/17/2020     BMP:    Lab Results   Component Value Date     04/17/2020    K 5.6 04/17/2020    K 5.8 04/15/2020    CL 92 04/17/2020    CO2 26 04/17/2020    BUN 56 04/17/2020    LABALBU 2.9 04/17/2020    CREATININE 7.0 04/17/2020    CALCIUM 8.6 04/17/2020    GFRAA 10 04/17/2020    GFRAA 22 02/22/2013    LABGLOM 8 04/17/2020    GLUCOSE 92 04/17/2020       IMPRESSION/RECOMMENDATIONS:      1- ESRD: Hemodiaysis ongoing per F schedule. 2- Hyperkalemia: Will apply a low potassium diet and continue dialysis per schedule. 3- Hypotension: Hold all antihypertensive medications and monitor. 4- Anemia: Hemoglobin within target for ESRD, monitor.   5- S/p right great toe amputation and currently on IV antibiotics per primary team.

## 2020-04-17 NOTE — PROGRESS NOTES
Treatment time: 210    Net UF: 2000    Pre weight: unalbe to wt   Post weight:   EDW:     Access used: R AVF   Access function: well    Medications or blood products given: vanco    Regular outpatient schedule: MWF    Summary of response to treatment: tolerated well    Copy of dialysis treatment record placed in chart, to be scanned into EMR.

## 2020-04-17 NOTE — ANESTHESIA PRE PROCEDURE
Department of Anesthesiology  Preprocedure Note       Name:  April Jacobo   Age:  56 y.o.  :  1960                                          MRN:  [id number]         Date:  2020      Surgeon: Mortimer Gerlach):  Gordo Go DPM    Procedure: AMPUTATION OF RIGHT GREAT TOE (Right )    Medications prior to admission:   Prior to Admission medications    Medication Sig Start Date End Date Taking?  Authorizing Provider   amiodarone (PACERONE) 100 MG tablet TAKE 1 TABLET BY MOUTH DAILY 20   LASHAUN Amaral - CNP   albuterol sulfate HFA (PROVENTIL HFA) 108 (90 Base) MCG/ACT inhaler Inhale 2 puffs into the lungs every 6 hours as needed for Wheezing 3/15/20 3/15/21  Babatunde Valadez MD   fluticasone [place]) 50 MCG/ACT nasal spray 1 spray by Each Nostril route daily 20   Malcom Northern, DO   Chlorpheniramine-DM 4-30 MG TABS Take 1 tablet by mouth 3 times daily as needed (CONGESTION) 20   Malcom [facility], DO   Respiratory Therapy Supplies (NEBULIZER COMPRESSOR) KIT 1 kit by Does not apply route once for 1 dose 20  Ana Cristina um V, DO   albuterol (PROVENTIL) (2.5 MG/3ML) 0.083% nebulizer solution Take 3 mLs by nebulization every 4 hours as needed for Wheezing or Shortness of Breath 20   Malcom [facility], DO   aspirin EC 81 MG EC tablet Take 2 tablets by mouth daily 19   Eugene Reina MD   atorvastatin (LIPITOR) 40 MG tablet Take 1 tablet by mouth daily 19   Kyrie Landon DO   metoprolol tartrate (LOPRESSOR) 50 MG tablet Take 1 tablet by mouth 2 times daily 18   Ricardo Guzman MD   ipratropium-albuterol (DUONEB) 0.5-2.5 (3) MG/3ML SOLN nebulizer solution Inhale 3 mLs into the lungs every 6 hours as needed for Shortness of Breath (dyspnea or wheezes. ) DX PULM HTN I27.20 18   Jessie Borges MD   B Complex-C-Folic Acid (TRIPHROCAPS PO) Take by mouth    Historical Provider, MD   docusate sodium (COLACE, DULCOLAX) 100 MG CAPS Take 100 mg by mouth 2 times daily 6/15/18   Hi Flor MD   citalopram (CELEXA) 40 MG tablet Take 1 tablet by mouth daily 6/16/18   Hi Flor MD   OXYGEN Inhale 2 L into the lungs continuous With concentrator and portability  Patient taking differently: Inhale 3 L into the lungs continuous With concentrator and portability 2/23/16   Sangeeta Celestin MD   traZODone (DESYREL) 150 MG tablet Take 150 mg by mouth nightly.     Historical Provider, MD   sevelamer (RENVELA) 800 MG tablet Take 4 tablets by mouth 3 times daily     Historical Provider, MD       Current medications:    Current Facility-Administered Medications   Medication Dose Route Frequency Provider Last Rate Last Dose    metoprolol tartrate (LOPRESSOR) tablet 25 mg  25 mg Oral BID Katherine Fuller MD   25 mg at 04/16/20 2158    piperacillin-tazobactam (ZOSYN) 2.25 g in dextrose 5 % 50 mL IVPB (mini-bag)  2.25 g Intravenous Q8H Bishop Ryan MD   Stopped at 04/16/20 2302    lidocaine PF 1 % injection 0.4 mL  0.4 mL Intradermal Once Katherine Fuller MD        albuterol (PROVENTIL) nebulizer solution 2.5 mg  2.5 mg Nebulization Q4H PRN Bishop Ryan MD   2.5 mg at 04/16/20 0844    amiodarone (CORDARONE) tablet 100 mg  100 mg Oral Daily Bishop Ryan MD   100 mg at 04/16/20 0805    aspirin EC tablet 81 mg  81 mg Oral Daily Pablo Miles MD   81 mg at 04/16/20 0805    atorvastatin (LIPITOR) tablet 40 mg  40 mg Oral Daily Bishop Ryan MD   40 mg at 04/16/20 0805    vitamin B complex w/C 1 tablet  1 tablet Oral Daily Bishop Ryan MD   1 tablet at 04/16/20 0805    citalopram (CELEXA) tablet 20 mg  20 mg Oral Daily Bishop Ryan MD   20 mg at 04/16/20 0805    sevelamer (RENVELA) tablet 3,200 mg  3,200 mg Oral TID Bishop yRan MD   3,200 mg at 04/16/20 2158    traZODone (DESYREL) tablet 150 mg  150 mg Oral Nightly Bishop Ryan MD   150 mg at 04/16/20 0900    sodium chloride flush 0.9 % injection 10 mL  10 mL Intravenous 2 times per day [facility]

## 2020-04-17 NOTE — OP NOTE
brought from the preoperative area  and placed on the operating room table in the supine position. Local  anesthetic block consisting of 10 mL of 1:1 0.5% Marcaine plain and 2.0%  lidocaine plain was injected proximal to the surgical site. The right  lower extremity was then scrubbed, prepped, and draped in the usual  sterile fashion. A #15 blade was used to make an incision circumferentially around the  base of the right hallux. The incision was full-thickness in nature. It was carried down to the level of the first metatarsophalangeal joint. At that level, the right hallux was disarticulated. I used a rongeur to  remove some of the infected necrotic tissue at the distal aspect of the  right hallux, that was sent for culture. The remainder of the specimen  was sent to pathology in formalin. At the level of amputation, the  remaining tissue was healthy. Good bleeding was noted. There was no  evidence of remaining infection. I irrigated the site with copious amounts of sterile saline via pulse  lavage. I injected 5 mL of 0.5% Marcaine plain for postoperative pain  relief. I then closed the site with 3-0 nylon. I applied a sterile  bandage of Xeroform, 4 x 4's, Kerlix, and an Ace wrap. The patient  tolerated the procedure and the anesthesia well. He was transported  from the operating room to the PACU with vital signs stable and vascular  status intact to all aspects of the right foot. Following a period of  postoperative monitoring, he will be discharged back to his room on the  floor with written and oral instructions from myself.         Abbie Hilario DPM    D: 04/17/2020 8:15:43       T: 04/17/2020 10:18:10     [email or web address]  Job#: [id number]     Doc#: [id number]    CC:

## 2020-04-17 NOTE — BRIEF OP NOTE
Brief Postoperative Note      Patient: Dannielle Porter  YOB: 1960  MRN: [id number]    Date of Procedure: 4/17/2020    Pre-Op Diagnosis: OSTEOMYELITIS OF RIGHT FOOT    Post-Op Diagnosis: Same       Procedure(s):  AMPUTATION OF RIGHT GREAT TOE    Surgeon(s):  Edrick Goldmann, DPM    Assistant:  Surgical Assistant: Flakito Gaitan    Anesthesia: MAC and local    Estimated Blood Loss (mL): less than 50     Complications: None    Specimens:   ID Type Source Tests Collected by Time Destination   1 : RIGHT GREAT TOE TISSUE FOR CULTURE Tissue Tissue CULTURE, FUNGUS, CULTURE, TISSUE, CULTURE WITH SMEAR, ACID FAST [address] [facility] 4/17/2020 1190    A : RIGHT GREAT TOE Specimen Toe SURGICAL PATHOLOGY Edrick Goldmann, DPM 4/17/2020 0754        Implants:  * No implants in log *      Drains: * No LDAs found *    Findings: Consistent with diagnosis. The right hallux was amputated. No further infection remaining. The surgical site was close. No further surgical intervention planned from a Podiatry standpoint.   He may weight-bear as tolerated in a surgical shoe    Electronically signed by Edrick Goldmann, DPM on 4/17/2020 at 8:05 AM

## 2020-04-17 NOTE — PROGRESS NOTES
I have reviewed the patient's H&P. I agree with the findings. Will proceed with right hallux amputation.     Davi Medrano [place], [address]  Office: [phone]  Mobile: [phone]

## 2020-04-18 VITALS
HEART RATE: 70 BPM | HEIGHT: 70 IN | RESPIRATION RATE: 16 BRPM | WEIGHT: 225 LBS | SYSTOLIC BLOOD PRESSURE: 113 MMHG | DIASTOLIC BLOOD PRESSURE: 63 MMHG | TEMPERATURE: 97.9 F | BODY MASS INDEX: 32.21 KG/M2 | OXYGEN SATURATION: 97 %

## 2020-04-18 LAB
BLOOD CULTURE, ROUTINE: NORMAL
CULTURE, BLOOD 2: NORMAL
GLUCOSE BLD-MCNC: 93 MG/DL (ref 70–99)
PERFORMED ON: NORMAL

## 2020-04-18 PROCEDURE — 6360000002 HC RX W HCPCS: Performed by: PODIATRIST

## 2020-04-18 PROCEDURE — 6370000000 HC RX 637 (ALT 250 FOR IP): Performed by: PODIATRIST

## 2020-04-18 PROCEDURE — 97530 THERAPEUTIC ACTIVITIES: CPT

## 2020-04-18 PROCEDURE — 2700000000 HC OXYGEN THERAPY PER DAY

## 2020-04-18 PROCEDURE — 97162 PT EVAL MOD COMPLEX 30 MIN: CPT

## 2020-04-18 PROCEDURE — 2580000003 HC RX 258: Performed by: PODIATRIST

## 2020-04-18 PROCEDURE — 97166 OT EVAL MOD COMPLEX 45 MIN: CPT

## 2020-04-18 PROCEDURE — 94761 N-INVAS EAR/PLS OXIMETRY MLT: CPT

## 2020-04-18 PROCEDURE — 2580000003 HC RX 258

## 2020-04-18 PROCEDURE — 97116 GAIT TRAINING THERAPY: CPT

## 2020-04-18 PROCEDURE — 97535 SELF CARE MNGMENT TRAINING: CPT

## 2020-04-18 PROCEDURE — 0Y6P0Z0 DETACHMENT AT RIGHT 1ST TOE, COMPLETE, OPEN APPROACH: ICD-10-PCS | Performed by: PODIATRIST

## 2020-04-18 RX ORDER — DOXYCYCLINE HYCLATE 100 MG
100 TABLET ORAL 2 TIMES DAILY
Qty: 14 TABLET | Refills: 0 | Status: SHIPPED | OUTPATIENT
Start: 2020-04-18 | End: 2020-04-25

## 2020-04-18 RX ORDER — SODIUM CHLORIDE 9 MG/ML
INJECTION, SOLUTION INTRAVENOUS
Status: COMPLETED
Start: 2020-04-18 | End: 2020-04-18

## 2020-04-18 RX ADMIN — SODIUM CHLORIDE: 9 INJECTION, SOLUTION INTRAVENOUS at 02:22

## 2020-04-18 RX ADMIN — Medication 10 ML: at 08:53

## 2020-04-18 RX ADMIN — SEVELAMER CARBONATE 3200 MG: 800 TABLET, FILM COATED ORAL at 08:53

## 2020-04-18 RX ADMIN — AMIODARONE HYDROCHLORIDE 100 MG: 200 TABLET ORAL at 08:53

## 2020-04-18 RX ADMIN — B-COMPLEX W/ C & FOLIC ACID TAB 1 TABLET: TAB at 08:53

## 2020-04-18 RX ADMIN — PIPERACILLIN AND TAZOBACTAM 2.25 G: 2; .25 INJECTION, POWDER, LYOPHILIZED, FOR SOLUTION INTRAVENOUS at 00:12

## 2020-04-18 RX ADMIN — CITALOPRAM HYDROBROMIDE 20 MG: 20 TABLET ORAL at 08:53

## 2020-04-18 RX ADMIN — ASPIRIN 81 MG: 81 TABLET, COATED ORAL at 08:53

## 2020-04-18 RX ADMIN — ATORVASTATIN CALCIUM 40 MG: 40 TABLET, FILM COATED ORAL at 08:53

## 2020-04-18 RX ADMIN — PIPERACILLIN AND TAZOBACTAM 2.25 G: 2; .25 INJECTION, POWDER, LYOPHILIZED, FOR SOLUTION INTRAVENOUS at 08:52

## 2020-04-18 RX ADMIN — METOPROLOL TARTRATE 25 MG: 25 TABLET, FILM COATED ORAL at 08:53

## 2020-04-18 RX ADMIN — HEPARIN SODIUM 5000 UNITS: 5000 INJECTION INTRAVENOUS; SUBCUTANEOUS at 05:16

## 2020-04-18 NOTE — PROGRESS NOTES
Transfers  Sit to Stand: Minimal Assistance;Contact guard assistance  Stand to sit: Contact guard assistance  Bed to Chair: Minimal assistance  Comment: SPT EOB to standard chair with RW and Jake, increased time complete and cues for hand placement/sequence. Sit to/from stand standard chair to RW CGA. Improved performance with second t/f      Ambulation  Ambulation?: Yes  WB Status: RLE WBAT in surgical shoe  More Ambulation?: No  Ambulation 1  Surface: level tile  Device: Rolling Walker  Other Apparatus: O2(2L O2 NC)  Assistance: Contact guard assistance;Minimal assistance  Quality of Gait: Decreased diana, wide BERTO, B decreased step length/height, B decreased toe clearance, RLE decreased WS, RLE decreased stance time, slight forward flexed trunk. Pt mildly unsteady with no overt LOB. Intermittent Jake for management of AD secondary to visual deficits  Distance: 5' + 40'  Comments: Distances limited secondary to fatigue. D/t pts visual deficits he requires frequent directional cues and intermittent Jake for AD management. Pt   Stairs/Curb  Stairs?: No(Deferred d/t safety)        Balance  Posture: Fair  Sitting - Static: Good  Sitting - Dynamic: Good  Standing - Static: Fair;+  Standing - Dynamic: Fair  Comments: CGA with RW        Plan   Plan  Times per week: 3-5x/wk  Times per day: Daily  Specific instructions for Next Treatment: Progress mobility as tolerated  Current Treatment Recommendations: Strengthening, Gait Training, Patient/Caregiver Education & Training, Stair training, Equipment Evaluation, Education, & procurement, Balance Training, Neuromuscular Re-education, Functional Mobility Training, Endurance Training, Home Exercise Program, Transfer Training, Safety Education & Training  Safety Devices  Type of devices:  All fall risk precautions in place, Left in chair, Call light within reach, Chair alarm in place, Nurse notified, Gait belt, Patient at risk for falls  Restraints  Initially in place:

## 2020-04-18 NOTE — DISCHARGE INSTR - COC
Continuity of Care Form    Patient Name: April Jacobo   :  1960  MRN:  [id number]    Admit date:  2020  Discharge date:  ***    Code Status Order: Full Code   Advance Directives:   Advance Care Flowsheet Documentation     Date/Time Healthcare Directive Type of Healthcare Directive Copy in [address] Agent's Name Healthcare Agent's Phone Number    20 1424  No, patient does not have an advance directive for healthcare treatment -- -- -- -- --    20 1714  No, patient does not have an advance directive for healthcare treatment -- -- -- -- --          Admitting Physician:  Kita Schmitt MD  PCP: Bernice Gonzalez    Discharging Nurse: [facility] Unit/Room#: 9017/8037-89  Discharging Unit Phone Number: ***    Emergency Contact:   Extended Emergency Contact Information  Primary Emergency Contact: [facility] Phone: [phone]  Mobile Phone: [phone]  Relation: Niece/Nephew  Secondary Emergency Contact: [address]  Phone: [phone]  Mobile Phone: [phone]  Relation: Other    Past Surgical History:  Past Surgical History:   Procedure Laterality Date    BRONCHOSCOPY  10/10/2012    COLONOSCOPY  2012    3 cold snared polyps, esophagus BX for Barretts.     DIALYSIS FISTULA CREATION Right     INSERTABLE CARDIAC MONITOR  2018    WATCHMAN PROCEDURE WITH SWETHA    THORACENTESIS      THORACOSCOPY Right 2013    with biopsy    TOE AMPUTATION Right 2020    AMPUTATION OF RIGHT GREAT TOE performed by Gordo Go DPM at [address] [place] [address] TRANSESOPHAGEAL ECHOCARDIOGRAM  2018    Watchman in good place       Immunization History:   Immunization History   Administered Date(s) Administered    FLUZONE 3 YEARS AND OVER 10/01/2015    Influenza Vaccine, unspecified formulation 2016    Influenza Virus Vaccine 10/01/2014, 2017    Influenza Whole 10/16/2015    Vickie Santa

## 2020-04-18 NOTE — PLAN OF CARE
Problem: Musculor/Skeletal Functional Status  Intervention: PT Evaluation/treatment    Note: Increase function to baseline

## 2020-04-20 ENCOUNTER — CARE COORDINATION (OUTPATIENT)
Dept: CASE MANAGEMENT | Age: 60
End: 2020-04-20

## 2020-04-20 NOTE — DISCHARGE SUMMARY
MG tablet Take 1 tablet by mouth daily, Disp-30 tablet, R-0NO PRINT      OXYGEN Inhale 2 L into the lungs continuous With concentrator and portability, Disp-1 each, R-0      traZODone (DESYREL) 150 MG tablet Take 150 mg by mouth nightly. sevelamer (RENVELA) 800 MG tablet Take 4 tablets by mouth 3 times daily Historical Med             Time Spent on discharge is more than 30 minutes in the examination, evaluation, counseling and review of medications and discharge plan. Signed:    LASHAUN Gomez - CNP   4/20/2020      Thank you Leslee Mccall for the opportunity to be involved in this patient's care. If you have any questions or concerns please feel free to contact me at [phone].

## 2020-04-20 NOTE — CARE COORDINATION
COVID-19 Monitoring:    Attempted to reach patient via phone for initial post hospital transition call. Attempted to reach patient time two attempts both attempts vm picks up and then disconnects. CTN contacted [facility] and spoke with Byron Hurtado who verified [facility] orders and stated she had not received. CTN sent MISSY/AVS and HHC order to fax # [phone] per request. Confirmation received the orders went through.      Jhon Barahona BSN, RN, [facility]  Care Transition Nurse   [phone]

## 2020-04-21 ENCOUNTER — CARE COORDINATION (OUTPATIENT)
Dept: CASE MANAGEMENT | Age: 60
End: 2020-04-21

## 2020-04-21 NOTE — CARE COORDINATION
COVID-19 Monitoring Risk:    Second attempt made to reach patient for initial post hospital transition call. Voicemail/answering machine picks up then disconnects.            Lynn CARDOZON, RN, [facility]  Care Transition Nurse   [phone]

## 2020-04-22 LAB
ANAEROBIC CULTURE: ABNORMAL
GRAM STAIN RESULT: ABNORMAL
ORGANISM: ABNORMAL
ORGANISM: ABNORMAL
WOUND/ABSCESS: ABNORMAL
WOUND/ABSCESS: ABNORMAL

## 2020-04-28 ENCOUNTER — CARE COORDINATION (OUTPATIENT)
Dept: CASE MANAGEMENT | Age: 60
End: 2020-04-28

## 2020-04-28 NOTE — CARE COORDINATION
COVID-19 Monitoring Call:    3rd and final attempt made to reach patient for initial post hospital follow up call. Left a voice message for patient with my contact information and informed of final outreach attempt.  (30d Monitoring)    Blossom CARDOZON, RN, [facility]  Care Transition Nurse   [phone]

## 2020-05-04 RX ORDER — AMIODARONE HYDROCHLORIDE 100 MG/1
100 TABLET ORAL DAILY
Qty: 30 TABLET | Refills: 1 | Status: SHIPPED | OUTPATIENT
Start: 2020-05-04 | End: 2020-07-01

## 2020-05-11 ENCOUNTER — OFFICE VISIT (OUTPATIENT)
Dept: CARDIOLOGY CLINIC | Age: 60
End: 2020-05-11
Payer: COMMERCIAL

## 2020-05-11 VITALS
BODY MASS INDEX: 33.15 KG/M2 | OXYGEN SATURATION: 92 % | DIASTOLIC BLOOD PRESSURE: 62 MMHG | HEART RATE: 77 BPM | WEIGHT: 231 LBS | SYSTOLIC BLOOD PRESSURE: 108 MMHG

## 2020-05-11 PROBLEM — I10 ESSENTIAL HYPERTENSION: Status: ACTIVE | Noted: 2020-05-11

## 2020-05-11 PROCEDURE — 99214 OFFICE O/P EST MOD 30 MIN: CPT | Performed by: NURSE PRACTITIONER

## 2020-05-11 RX ORDER — MIDODRINE HYDROCHLORIDE 10 MG/1
10 TABLET ORAL SEE ADMIN INSTRUCTIONS
Status: ON HOLD | COMMUNITY
End: 2020-08-03 | Stop reason: HOSPADM

## 2020-05-11 NOTE — LETTER
Past Surgical History:   has a past surgical history that includes Dialysis fistula creation (Right); Tonsillectomy; Colonoscopy (4/18/2012); thoracentesis; bronchoscopy (10/10/2012); Thoracoscopy (Right, 02/18/2013); transesophageal echocardiogram (04/13/2018); Insertable Cardiac Monitor (02/21/2018); and Toe amputation (Right, 4/17/2020). Home Medications:    Prior to Admission medications    Medication Sig Start Date End Date Taking?  Authorizing Provider   midodrine (PROAMATINE) 10 MG tablet Take 10 mg by mouth See Admin Instructions Take 1 tab id diapyesis if spb <120   Yes Historical Provider, MD   amiodarone (PACERONE) 100 MG tablet TAKE 1 TABLET BY MOUTH DAILY 5/4/20  Yes Eliezer Stapleton MD   albuterol sulfate HFA (PROVENTIL HFA) 108 (90 Base) MCG/ACT inhaler Inhale 2 puffs into the lungs every 6 hours as needed for Wheezing 3/15/20 3/15/21 Yes Sergey Hoover MD   fluticasone [facility]) 50 MCG/ACT nasal spray 1 spray by Each Nostril route daily 2/2/20  Yes Yaz Mota V, DO   Chlorpheniramine-DM 4-30 MG TABS Take 1 tablet by mouth 3 times daily as needed (CONGESTION) 2/2/20  Yes Yaz Mota V, DO   albuterol (PROVENTIL) (2.5 MG/3ML) 0.083% nebulizer solution Take 3 mLs by nebulization every 4 hours as needed for Wheezing or Shortness of Breath 2/2/20  Yes Amy Araujo, DO   aspirin EC 81 MG EC tablet Take 2 tablets by mouth daily 1/7/19  Yes Villa Freeman MD   atorvastatin (LIPITOR) 40 MG tablet Take 1 tablet by mouth daily 1/5/19  Yes Noah Landon,    metoprolol tartrate (LOPRESSOR) 50 MG tablet Take 1 tablet by mouth 2 times daily 9/17/18  Yes Chris Wagoner MD   ipratropium-albuterol (DUONEB) 0.5-2.5 (3) MG/3ML SOLN nebulizer solution Inhale 3 mLs into the lungs every 6 hours as needed for Shortness of Breath (dyspnea or wheezes. ) DX PULM HTN I27.20 8/13/18  Yes Ellyn Tucker MD   B Complex-C-Folic Acid (TRIPHROCAPS PO) Take by mouth   Yes Historical Provider, MD docusate sodium (COLACE, DULCOLAX) 100 MG CAPS Take 100 mg by mouth 2 times daily 6/15/18  Yes Hi Flor MD   citalopram (CELEXA) 40 MG tablet Take 1 tablet by mouth daily 6/16/18  Yes Hi Flor MD   OXYGEN Inhale 2 L into the lungs continuous With concentrator and portability  Patient taking differently: Inhale 3 L into the lungs continuous With concentrator and portability 2/23/16  Yes Sangeeta Celestin MD   traZODone (DESYREL) 150 MG tablet Take 150 mg by mouth nightly. Yes Historical Provider, MD   sevelamer (RENVELA) 800 MG tablet Take 4 tablets by mouth 3 times daily    Yes Historical Provider, MD   Respiratory Therapy Supplies (NEBULIZER COMPRESSOR) KIT 1 kit by Does not apply route once for 1 dose 2/2/20 2/2/20  Teodora Waller,        Allergies:  Bee pollen; Codeine; and Rosiglitazone    Social History:   reports that he quit smoking about 43 years ago. His smoking use included cigarettes. He has a 4.50 pack-year smoking history. He has never used smokeless tobacco. He reports that he does not drink alcohol or use drugs. Family History: family history includes Cancer in his mother and sister; Diabetes in his father. Review of Systems   All 14-point review of systems are completed and pertinent positives are mentioned in the history of present illness. Other systems are reviewed and are negative. PHYSICAL EXAM:    Vital signs:    /62   Pulse 77   Wt 231 lb (104.8 kg)   SpO2 92%   BMI 33.15 kg/m²       Constitutional and general appearance: fatigued, cooperative, no distress and appears older than stated age  [de-identified]: PERRL, no cervical lymphadenopathy. No masses palpable.  Normal oral mucosa  Respiratory:  · Normal excursion and expansion without use of accessory muscles  · Resp auscultation: Bilateral breath sounds diminished, no wheezing/rhonchi/rales  Cardiovascular:  · The apical impulse is not displaced  · Heart tones are crisp and normal. regular S1 and S2.

## 2020-05-11 NOTE — PATIENT INSTRUCTIONS
Decrease metoprolol to 25mg twice per day   Check thyroid function during dialysis  Follow up in 6 months

## 2020-05-11 NOTE — PROGRESS NOTES
normal in amplitude and contour without delay or bruit  · Peripheral pulses are symmetrical and full   Abdomen:  · No masses or tenderness  · Bowel sounds present  Extremities:  ·  No cyanosis or clubbing  ·  No lower extremity edema  ·  Skin: warm and dry  Neurological:  · Alert and oriented  · Moves all extremities well  · No abnormalities of mood, affect, memory, mentation, or behavior are noted    DATA:      SWETHA 1/7/2019:  Left ventricular systolic function is normal with an estimated ejection fraction of 55%. Mild mitral regurgitation. Mild tricuspid regurgitation. Watchman device was well seated in the left atrial appendage with no flow through the device and no leak. SWETHA 4/13/2018:  Ejection fraction is visually estimated to be 45-50 %.   Mild mitral regurgitation.   Moderately dilated left atrium.   Watchman device in place. No leak around the watchman device   Moderate-to-severe tricuspid regurgitation.   The right and left atriums are moderately dilated. Stress test 1/4/2019:   Normal myocardial perfusion with normal left ventricular function and wall    motion.    The left ventricular size is mildly dilated.    The estimated left ventricular function is 62%. Stress test 2/17/2016:  IMPRESSION:   1. No pharmacologically induced reversible perfusion defects to suggest   ischemia.  Stable fixed perfusion defect within the inferior wall either   represents old infarct or diaphragmatic attenuation -unchanged from 2012.   2. Ejection fraction of 55%       CARDIOLOGY LABS:   CBC: No results for input(s): WBC, HGB, HCT, PLT in the last 72 hours. BMP: No results for input(s): NA, K, CO2, BUN, CREATININE, LABGLOM, GLUCOSE in the last 72 hours. PT/INR: No results for input(s): PROTIME, INR in the last 72 hours. APTT:No results for input(s): APTT in the last 72 hours.   FASTING LIPID PANEL:  Lab Results   Component Value Date    HDL 38 10/03/2012    HDL 34 09/17/2010    LDLCALC 134 10/03/2012 TRIG 173 10/03/2012     LIVER PROFILE:No results for input(s): AST, ALT, ALB in the last 72 hours. Assessment:   1. Paroxysmal atrial fibrillation and atrial flutter: stable    -on ASA post Watchman implant (2/2018)   -on amiodarone   2. HTN: controlled (BP soft with HD, requires midodrine)  3. Chronic diastolic CHF: compensated  4. Pulmonary HTN  5. HLD  6. ESRD: on HD MWF  7. Chronic anemia  8. DM  9. Vision loss: patient is legally blind  10. Chronic respiratory failure: on home O2    Plan:   1. Continue amiodarone and ASA   2. Decrease metoprolol to 25mg twice per day due to soft BP  3. TSH and LFT every 6 months while on amiodarone (TSH due now, ordered)  4.  Follow up in 6 months     LASHAUN Del Rio-CNP  [facility]  [phone]

## 2020-05-18 LAB
FUNGUS (MYCOLOGY) CULTURE: NORMAL
FUNGUS STAIN: NORMAL

## 2020-06-02 LAB
AFB CULTURE (MYCOBACTERIA): NORMAL
AFB SMEAR: NORMAL

## 2020-06-19 ENCOUNTER — TELEPHONE (OUTPATIENT)
Dept: CARDIOLOGY CLINIC | Age: 60
End: 2020-06-19

## 2020-07-01 RX ORDER — AMIODARONE HYDROCHLORIDE 100 MG/1
100 TABLET ORAL DAILY
Qty: 30 TABLET | Refills: 0 | Status: SHIPPED | OUTPATIENT
Start: 2020-07-01 | End: 2020-08-05

## 2020-07-31 ENCOUNTER — APPOINTMENT (OUTPATIENT)
Dept: GENERAL RADIOLOGY | Age: 60
End: 2020-07-31
Payer: COMMERCIAL

## 2020-07-31 ENCOUNTER — HOSPITAL ENCOUNTER (OUTPATIENT)
Age: 60
Setting detail: OBSERVATION
Discharge: HOME | End: 2020-08-05
Attending: EMERGENCY MEDICINE | Admitting: INTERNAL MEDICINE
Payer: COMMERCIAL

## 2020-07-31 LAB
ANION GAP SERPL CALCULATED.3IONS-SCNC: 15 MMOL/L (ref 3–16)
BASOPHILS ABSOLUTE: 0.1 K/UL (ref 0–0.2)
BASOPHILS RELATIVE PERCENT: 1.5 %
BUN BLDV-MCNC: 29 MG/DL (ref 7–20)
CALCIUM SERPL-MCNC: 10.3 MG/DL (ref 8.3–10.6)
CHLORIDE BLD-SCNC: 92 MMOL/L (ref 99–110)
CO2: 25 MMOL/L (ref 21–32)
CREAT SERPL-MCNC: 4.9 MG/DL (ref 0.8–1.3)
EOSINOPHILS ABSOLUTE: 0.4 K/UL (ref 0–0.6)
EOSINOPHILS RELATIVE PERCENT: 3.9 %
GFR AFRICAN AMERICAN: 15
GFR NON-AFRICAN AMERICAN: 12
GLUCOSE BLD-MCNC: 105 MG/DL (ref 70–99)
HCT VFR BLD CALC: 45.2 % (ref 40.5–52.5)
HEMOGLOBIN: 14.7 G/DL (ref 13.5–17.5)
LYMPHOCYTES ABSOLUTE: 1 K/UL (ref 1–5.1)
LYMPHOCYTES RELATIVE PERCENT: 9.8 %
MCH RBC QN AUTO: 31.1 PG (ref 26–34)
MCHC RBC AUTO-ENTMCNC: 32.5 G/DL (ref 31–36)
MCV RBC AUTO: 95.9 FL (ref 80–100)
MONOCYTES ABSOLUTE: 0.8 K/UL (ref 0–1.3)
MONOCYTES RELATIVE PERCENT: 8.1 %
NEUTROPHILS ABSOLUTE: 7.7 K/UL (ref 1.7–7.7)
NEUTROPHILS RELATIVE PERCENT: 76.7 %
PDW BLD-RTO: 16 % (ref 12.4–15.4)
PLATELET # BLD: 323 K/UL (ref 135–450)
PLATELET SLIDE REVIEW: ABNORMAL
PMV BLD AUTO: 7.3 FL (ref 5–10.5)
POTASSIUM SERPL-SCNC: 4.8 MMOL/L (ref 3.5–5.1)
PRO-BNP: 2625 PG/ML (ref 0–124)
RBC # BLD: 4.71 M/UL (ref 4.2–5.9)
REASON FOR REJECTION: NORMAL
REJECTED TEST: NORMAL
SLIDE REVIEW: ABNORMAL
SODIUM BLD-SCNC: 132 MMOL/L (ref 136–145)
TROPONIN: 0.04 NG/ML
WBC # BLD: 10 K/UL (ref 4–11)

## 2020-07-31 PROCEDURE — 6370000000 HC RX 637 (ALT 250 FOR IP): Performed by: NURSE PRACTITIONER

## 2020-07-31 PROCEDURE — 83880 ASSAY OF NATRIURETIC PEPTIDE: CPT

## 2020-07-31 PROCEDURE — 85025 COMPLETE CBC W/AUTO DIFF WBC: CPT

## 2020-07-31 PROCEDURE — 99285 EMERGENCY DEPT VISIT HI MDM: CPT

## 2020-07-31 PROCEDURE — 2700000000 HC OXYGEN THERAPY PER DAY

## 2020-07-31 PROCEDURE — 2580000003 HC RX 258

## 2020-07-31 PROCEDURE — 93005 ELECTROCARDIOGRAM TRACING: CPT | Performed by: EMERGENCY MEDICINE

## 2020-07-31 PROCEDURE — 84484 ASSAY OF TROPONIN QUANT: CPT

## 2020-07-31 PROCEDURE — 94761 N-INVAS EAR/PLS OXIMETRY MLT: CPT

## 2020-07-31 PROCEDURE — 36415 COLL VENOUS BLD VENIPUNCTURE: CPT

## 2020-07-31 PROCEDURE — 71045 X-RAY EXAM CHEST 1 VIEW: CPT

## 2020-07-31 PROCEDURE — 80048 BASIC METABOLIC PNL TOTAL CA: CPT

## 2020-07-31 RX ORDER — 0.9 % SODIUM CHLORIDE 0.9 %
1000 INTRAVENOUS SOLUTION INTRAVENOUS ONCE
Status: COMPLETED | OUTPATIENT
Start: 2020-07-31 | End: 2020-07-31

## 2020-07-31 RX ORDER — SODIUM CHLORIDE 9 MG/ML
INJECTION, SOLUTION INTRAVENOUS
Status: COMPLETED
Start: 2020-07-31 | End: 2020-07-31

## 2020-07-31 RX ORDER — MIDODRINE HYDROCHLORIDE 5 MG/1
10 TABLET ORAL ONCE
Status: COMPLETED | OUTPATIENT
Start: 2020-07-31 | End: 2020-07-31

## 2020-07-31 RX ADMIN — Medication 1000 ML: at 20:19

## 2020-07-31 RX ADMIN — SODIUM CHLORIDE 1000 ML: 9 INJECTION, SOLUTION INTRAVENOUS at 20:19

## 2020-07-31 RX ADMIN — MIDODRINE HYDROCHLORIDE 10 MG: 5 TABLET ORAL at 21:57

## 2020-07-31 ASSESSMENT — PAIN DESCRIPTION - PAIN TYPE: TYPE: ACUTE PAIN

## 2020-07-31 ASSESSMENT — PAIN DESCRIPTION - ONSET: ONSET: ON-GOING

## 2020-07-31 ASSESSMENT — PAIN DESCRIPTION - PROGRESSION: CLINICAL_PROGRESSION: GRADUALLY WORSENING

## 2020-07-31 ASSESSMENT — PAIN DESCRIPTION - ORIENTATION: ORIENTATION: RIGHT;LEFT

## 2020-07-31 ASSESSMENT — PAIN SCALES - GENERAL: PAINLEVEL_OUTOF10: 8

## 2020-07-31 ASSESSMENT — PAIN DESCRIPTION - FREQUENCY: FREQUENCY: CONTINUOUS

## 2020-07-31 ASSESSMENT — PAIN DESCRIPTION - LOCATION: LOCATION: HEAD;NECK

## 2020-07-31 ASSESSMENT — PAIN DESCRIPTION - DESCRIPTORS: DESCRIPTORS: CONSTANT;PRESSURE

## 2020-07-31 NOTE — ED NOTES
Bed: 06  Expected date: 7/31/20  Expected time:   Means of arrival:   Comments:  [address] [facility]  07/31/20 1657
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
## 2020-08-01 PROBLEM — I95.9 HYPOTENSION: Status: ACTIVE | Noted: 2020-08-01

## 2020-08-01 LAB
ANION GAP SERPL CALCULATED.3IONS-SCNC: 15 MMOL/L (ref 3–16)
BUN BLDV-MCNC: 42 MG/DL (ref 7–20)
CALCIUM SERPL-MCNC: 9.3 MG/DL (ref 8.3–10.6)
CHLORIDE BLD-SCNC: 98 MMOL/L (ref 99–110)
CO2: 21 MMOL/L (ref 21–32)
CREAT SERPL-MCNC: 6.5 MG/DL (ref 0.8–1.3)
EKG ATRIAL RATE: 78 BPM
EKG DIAGNOSIS: NORMAL
EKG P AXIS: -7 DEGREES
EKG P-R INTERVAL: 122 MS
EKG Q-T INTERVAL: 426 MS
EKG QRS DURATION: 84 MS
EKG QTC CALCULATION (BAZETT): 485 MS
EKG R AXIS: 35 DEGREES
EKG T AXIS: 42 DEGREES
EKG VENTRICULAR RATE: 78 BPM
GFR AFRICAN AMERICAN: 11
GFR NON-AFRICAN AMERICAN: 9
GLUCOSE BLD-MCNC: 129 MG/DL (ref 70–99)
POTASSIUM REFLEX MAGNESIUM: 4.3 MMOL/L (ref 3.5–5.1)
SODIUM BLD-SCNC: 134 MMOL/L (ref 136–145)

## 2020-08-01 PROCEDURE — 6360000002 HC RX W HCPCS: Performed by: HOSPITALIST

## 2020-08-01 PROCEDURE — 2580000003 HC RX 258: Performed by: EMERGENCY MEDICINE

## 2020-08-01 PROCEDURE — 2580000003 HC RX 258: Performed by: HOSPITALIST

## 2020-08-01 PROCEDURE — 80048 BASIC METABOLIC PNL TOTAL CA: CPT

## 2020-08-01 PROCEDURE — 94150 VITAL CAPACITY TEST: CPT

## 2020-08-01 PROCEDURE — G0378 HOSPITAL OBSERVATION PER HR: HCPCS

## 2020-08-01 PROCEDURE — 6370000000 HC RX 637 (ALT 250 FOR IP): Performed by: HOSPITALIST

## 2020-08-01 PROCEDURE — 97535 SELF CARE MNGMENT TRAINING: CPT

## 2020-08-01 PROCEDURE — 97530 THERAPEUTIC ACTIVITIES: CPT

## 2020-08-01 PROCEDURE — 97165 OT EVAL LOW COMPLEX 30 MIN: CPT

## 2020-08-01 PROCEDURE — 2700000000 HC OXYGEN THERAPY PER DAY

## 2020-08-01 PROCEDURE — 97162 PT EVAL MOD COMPLEX 30 MIN: CPT

## 2020-08-01 PROCEDURE — 2060000000 HC ICU INTERMEDIATE R&B

## 2020-08-01 PROCEDURE — 93010 ELECTROCARDIOGRAM REPORT: CPT | Performed by: INTERNAL MEDICINE

## 2020-08-01 PROCEDURE — 94761 N-INVAS EAR/PLS OXIMETRY MLT: CPT

## 2020-08-01 PROCEDURE — 96372 THER/PROPH/DIAG INJ SC/IM: CPT

## 2020-08-01 RX ORDER — POLYETHYLENE GLYCOL 3350 17 G/17G
17 POWDER, FOR SOLUTION ORAL DAILY PRN
Status: DISCONTINUED | OUTPATIENT
Start: 2020-08-01 | End: 2020-08-05 | Stop reason: HOSPADM

## 2020-08-01 RX ORDER — B COMPLEX, C NO.20/FOLIC ACID 1 MG
1 CAPSULE ORAL DAILY
Status: DISCONTINUED | OUTPATIENT
Start: 2020-08-01 | End: 2020-08-01 | Stop reason: RX

## 2020-08-01 RX ORDER — SEVELAMER CARBONATE 800 MG/1
3200 TABLET, FILM COATED ORAL 3 TIMES DAILY
Status: DISCONTINUED | OUTPATIENT
Start: 2020-08-01 | End: 2020-08-05 | Stop reason: HOSPADM

## 2020-08-01 RX ORDER — CITALOPRAM 20 MG/1
40 TABLET ORAL DAILY
Status: DISCONTINUED | OUTPATIENT
Start: 2020-08-01 | End: 2020-08-05 | Stop reason: HOSPADM

## 2020-08-01 RX ORDER — 0.9 % SODIUM CHLORIDE 0.9 %
500 INTRAVENOUS SOLUTION INTRAVENOUS ONCE
Status: COMPLETED | OUTPATIENT
Start: 2020-08-01 | End: 2020-08-01

## 2020-08-01 RX ORDER — ACETAMINOPHEN 650 MG/1
650 SUPPOSITORY RECTAL EVERY 6 HOURS PRN
Status: DISCONTINUED | OUTPATIENT
Start: 2020-08-01 | End: 2020-08-05 | Stop reason: HOSPADM

## 2020-08-01 RX ORDER — MIDODRINE HYDROCHLORIDE 5 MG/1
10 TABLET ORAL
Status: DISCONTINUED | OUTPATIENT
Start: 2020-08-01 | End: 2020-08-05 | Stop reason: HOSPADM

## 2020-08-01 RX ORDER — ONDANSETRON 2 MG/ML
4 INJECTION INTRAMUSCULAR; INTRAVENOUS EVERY 6 HOURS PRN
Status: DISCONTINUED | OUTPATIENT
Start: 2020-08-01 | End: 2020-08-05 | Stop reason: HOSPADM

## 2020-08-01 RX ORDER — SODIUM CHLORIDE 9 MG/ML
1000 INJECTION, SOLUTION INTRAVENOUS CONTINUOUS
Status: DISCONTINUED | OUTPATIENT
Start: 2020-08-01 | End: 2020-08-01

## 2020-08-01 RX ORDER — SODIUM CHLORIDE 9 MG/ML
INJECTION, SOLUTION INTRAVENOUS CONTINUOUS
Status: DISCONTINUED | OUTPATIENT
Start: 2020-08-01 | End: 2020-08-01

## 2020-08-01 RX ORDER — DOCUSATE SODIUM 100 MG/1
100 CAPSULE, LIQUID FILLED ORAL 2 TIMES DAILY
Status: DISCONTINUED | OUTPATIENT
Start: 2020-08-01 | End: 2020-08-05 | Stop reason: HOSPADM

## 2020-08-01 RX ORDER — ALBUTEROL SULFATE 2.5 MG/3ML
2.5 SOLUTION RESPIRATORY (INHALATION) EVERY 4 HOURS PRN
Status: DISCONTINUED | OUTPATIENT
Start: 2020-08-01 | End: 2020-08-05 | Stop reason: HOSPADM

## 2020-08-01 RX ORDER — FLUTICASONE PROPIONATE 50 MCG
1 SPRAY, SUSPENSION (ML) NASAL DAILY
Status: DISCONTINUED | OUTPATIENT
Start: 2020-08-01 | End: 2020-08-05 | Stop reason: HOSPADM

## 2020-08-01 RX ORDER — TRAZODONE HYDROCHLORIDE 50 MG/1
150 TABLET ORAL NIGHTLY
Status: DISCONTINUED | OUTPATIENT
Start: 2020-08-01 | End: 2020-08-05 | Stop reason: HOSPADM

## 2020-08-01 RX ORDER — SODIUM CHLORIDE 0.9 % (FLUSH) 0.9 %
10 SYRINGE (ML) INJECTION PRN
Status: DISCONTINUED | OUTPATIENT
Start: 2020-08-01 | End: 2020-08-05 | Stop reason: HOSPADM

## 2020-08-01 RX ORDER — ATORVASTATIN CALCIUM 40 MG/1
40 TABLET, FILM COATED ORAL DAILY
Status: DISCONTINUED | OUTPATIENT
Start: 2020-08-01 | End: 2020-08-05 | Stop reason: HOSPADM

## 2020-08-01 RX ORDER — HEPARIN SODIUM 5000 [USP'U]/ML
5000 INJECTION, SOLUTION INTRAVENOUS; SUBCUTANEOUS EVERY 8 HOURS SCHEDULED
Status: DISCONTINUED | OUTPATIENT
Start: 2020-08-01 | End: 2020-08-05 | Stop reason: HOSPADM

## 2020-08-01 RX ORDER — SODIUM CHLORIDE 0.9 % (FLUSH) 0.9 %
10 SYRINGE (ML) INJECTION EVERY 12 HOURS SCHEDULED
Status: DISCONTINUED | OUTPATIENT
Start: 2020-08-01 | End: 2020-08-05 | Stop reason: HOSPADM

## 2020-08-01 RX ORDER — IPRATROPIUM BROMIDE AND ALBUTEROL SULFATE 2.5; .5 MG/3ML; MG/3ML
3 SOLUTION RESPIRATORY (INHALATION) EVERY 6 HOURS PRN
Status: DISCONTINUED | OUTPATIENT
Start: 2020-08-01 | End: 2020-08-01

## 2020-08-01 RX ORDER — PROMETHAZINE HYDROCHLORIDE 25 MG/1
12.5 TABLET ORAL EVERY 6 HOURS PRN
Status: DISCONTINUED | OUTPATIENT
Start: 2020-08-01 | End: 2020-08-05 | Stop reason: HOSPADM

## 2020-08-01 RX ORDER — IPRATROPIUM BROMIDE AND ALBUTEROL SULFATE 2.5; .5 MG/3ML; MG/3ML
3 SOLUTION RESPIRATORY (INHALATION) EVERY 6 HOURS PRN
Status: DISCONTINUED | OUTPATIENT
Start: 2020-08-01 | End: 2020-08-05 | Stop reason: HOSPADM

## 2020-08-01 RX ORDER — ACETAMINOPHEN 325 MG/1
650 TABLET ORAL EVERY 6 HOURS PRN
Status: DISCONTINUED | OUTPATIENT
Start: 2020-08-01 | End: 2020-08-05 | Stop reason: HOSPADM

## 2020-08-01 RX ORDER — ALBUTEROL SULFATE 2.5 MG/3ML
2.5 SOLUTION RESPIRATORY (INHALATION) EVERY 4 HOURS PRN
Status: DISCONTINUED | OUTPATIENT
Start: 2020-08-01 | End: 2020-08-01

## 2020-08-01 RX ORDER — ASPIRIN 81 MG/1
162 TABLET ORAL DAILY
Status: DISCONTINUED | OUTPATIENT
Start: 2020-08-01 | End: 2020-08-05 | Stop reason: HOSPADM

## 2020-08-01 RX ADMIN — HEPARIN SODIUM 5000 UNITS: 5000 INJECTION INTRAVENOUS; SUBCUTANEOUS at 12:41

## 2020-08-01 RX ADMIN — DOCUSATE SODIUM 100 MG: 100 CAPSULE, LIQUID FILLED ORAL at 09:12

## 2020-08-01 RX ADMIN — MIDODRINE HYDROCHLORIDE 10 MG: 5 TABLET ORAL at 12:37

## 2020-08-01 RX ADMIN — Medication 10 ML: at 20:35

## 2020-08-01 RX ADMIN — SODIUM CHLORIDE 500 ML: 9 INJECTION, SOLUTION INTRAVENOUS at 03:43

## 2020-08-01 RX ADMIN — SODIUM CHLORIDE 1000 ML: 9 INJECTION, SOLUTION INTRAVENOUS at 02:07

## 2020-08-01 RX ADMIN — SODIUM CHLORIDE: 9 INJECTION, SOLUTION INTRAVENOUS at 03:43

## 2020-08-01 RX ADMIN — MIDODRINE HYDROCHLORIDE 10 MG: 5 TABLET ORAL at 17:34

## 2020-08-01 RX ADMIN — SEVELAMER CARBONATE 3200 MG: 800 TABLET, FILM COATED ORAL at 09:12

## 2020-08-01 RX ADMIN — SEVELAMER CARBONATE 3200 MG: 800 TABLET, FILM COATED ORAL at 20:34

## 2020-08-01 RX ADMIN — CITALOPRAM HYDROBROMIDE 40 MG: 20 TABLET ORAL at 09:12

## 2020-08-01 RX ADMIN — HEPARIN SODIUM 5000 UNITS: 5000 INJECTION INTRAVENOUS; SUBCUTANEOUS at 20:38

## 2020-08-01 RX ADMIN — TRAZODONE HYDROCHLORIDE 150 MG: 50 TABLET ORAL at 20:36

## 2020-08-01 RX ADMIN — ASPIRIN 162 MG: 81 TABLET, COATED ORAL at 09:12

## 2020-08-01 RX ADMIN — DOCUSATE SODIUM 100 MG: 100 CAPSULE, LIQUID FILLED ORAL at 20:34

## 2020-08-01 RX ADMIN — MIDODRINE HYDROCHLORIDE 10 MG: 5 TABLET ORAL at 09:13

## 2020-08-01 RX ADMIN — SEVELAMER CARBONATE 3200 MG: 800 TABLET, FILM COATED ORAL at 12:37

## 2020-08-01 RX ADMIN — ATORVASTATIN CALCIUM 40 MG: 40 TABLET, FILM COATED ORAL at 09:12

## 2020-08-01 ASSESSMENT — PAIN SCALES - GENERAL
PAINLEVEL_OUTOF10: 0

## 2020-08-01 NOTE — ED NOTES
Getting patient back into bed he was very winded. Difficulty catching his breath stated he was feeling woozy. BP 88/56 after getting patient up. MD aware.    Jeri Grimes RN  07/31/20 9526 [facility], RN  08/01/20 [id number]

## 2020-08-01 NOTE — PROGRESS NOTES
Patient admitted to room 442 from ED. Patient oriented to room, call light, bed rails, phone, lights and bathroom. Patient instructed about the schedule of the day including: vital sign frequency, lab draws, possible tests, frequency of MD and staff rounds, including RN/MD rounding together at bedside, daily weights, and I &O's. Patient instructed about prescribed diet, how to use 8MENU, and television. Bed alarm in place, patient aware of placement and reason. Telemetry box in place, patient aware of placement and reason. Bed locked, in lowest position, side rails up 2/4, call light within reach. Will continue to monitor. Alina Ortiz RN    3:53 AM  Message sent to MD to verify orders for maintaince fluids. Alina Ortiz RN    4:01 AM  SBP stable at this time, okay to stop IVF per admitting MD. Will continue to monitor pressures closely. Alina Ortiz RN    5:28 AM  VSS.  Alina Ortiz RN
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
## 2020-08-01 NOTE — ED NOTES

## 2020-08-01 NOTE — PROGRESS NOTES
CREATION Right     INSERTABLE CARDIAC MONITOR  02/21/2018    WATCHMAN PROCEDURE WITH SWETHA    THORACENTESIS      THORACOSCOPY Right 02/18/2013    with biopsy    TOE AMPUTATION Right 4/17/2020    AMPUTATION OF RIGHT GREAT TOE performed by Gavin Hylton DPM at [address] [place] [address] TRANSESOPHAGEAL ECHOCARDIOGRAM  04/13/2018    Watchman in good place       Level of Consciousness: Alert, Oriented, and Cooperative = 0    Level of Activity: Walking with assistance = 1    Respiratory Pattern: Regular Pattern; RR 8-20 = 0    Breath Sounds: Clear = 0    Sputum   ,  ,    Cough: Strong, spontaneous, non-productive = 0    Vital Signs   BP (!) 90/56   Pulse 73   Temp 98.7 °F (37.1 °C) (Oral)   Resp 18   Ht 5' 9\" (1.753 m)   Wt 243 lb 2.7 oz (110.3 kg)   SpO2 99%   BMI 35.91 kg/m²   SPO2 (COPD values may differ): 88-89% on room air or greater than 92% on FiO2 28- 35% = 2    Peak Flow (asthma only): not applicable = 0    RSI: 5-6 = Q4hr PRN (every four hours as needed) for dyspnea        Plan       Goals: medication delivery, mobilize retained secretions, volume expansion and improve oxygenation    Patient/caregiver was educated on the proper method of use for Respiratory Care Devices:  Yes      Level of patient/caregiver understanding able to:   ? Verbalize understanding   ? Demonstrate understanding       ? Teach back        ? Needs reinforcement       ? No available caregiver               ? Other:     Response to education:  Good     Is patient being placed on Home Treatment Regimen? Yes     Does the patient have everything they need prior to discharge? NA     Comments: Patient admits with hypotension. Plan of Care: HHN Albuterol and Duoneb PRN    Electronically signed by Del Kidd RCP on 8/1/2020 at 5:02 AM    Respiratory Protocol Guidelines     1.  Assessment and treatment by Respiratory Therapy will be initiated for medication and therapeutic interventions upon initiation of aerosolized

## 2020-08-01 NOTE — PROGRESS NOTES
Occupational Therapy   Occupational Therapy Initial Assessment/Treatment   Date: 2020   Patient Name: Ursula Sellers  MRN: [id number]     : 1960    Date of Service: 2020    Discharge Recommendations:  24 hour supervision or assist, S Level 1, Home with Home health OT  OT Equipment Recommendations  Other: shower chair    Assessment   Performance deficits / Impairments: Decreased functional mobility ; Decreased ADL status; Decreased endurance  Assessment: Patient is a Margrett Coral old male admitted for dizziness. Patient is blind and reports the set-up A and supervision for mobility/ADLs. Patient currently limited by orthostatic hypotension. Anticipate patient will be safe to discharge home with 24hr assistance and HHOT  if BP improves. Prognosis: Good  Decision Making: Medium Complexity  OT Education: OT Role;Plan of Care;Precautions;Transfer Training  REQUIRES OT FOLLOW UP: Yes  Activity Tolerance  Activity Tolerance: Treatment limited secondary to medical complications (free text)  Activity Tolerance: Supine BP 86/53 HR 62; Supine with HOB 55* /64 HR 68; Sitting EOB /60 HR 69; Standing BP 94/56 HR 79. Pt denies dizziness throughout session  Safety Devices  Safety Devices in place: Yes  Type of devices: Left in bed; All fall risk precautions in place;Call light within reach;Nurse notified; Bed alarm in place(RN requesting to keep patient sitting at EOB)           Patient Diagnosis(es): The primary encounter diagnosis was Hypotension, unspecified hypotension type. Diagnoses of ESRD (end stage renal disease) on dialysis ([address] and Dizziness were also pertinent to this visit.      has a past medical history of Atrial fibrillation with RVR, Rosario's esophagus, Blind left eye, CAD (coronary artery disease), CHF (congestive heart failure) ([address] Chronic kidney disease, Diabetes mellitus ([facility] [address] ESRD (end stage renal disease) on dialysis [facility]), Hemodialysis access site with arteriovenous graft ([facility] [address] to TV, Goe outside  Additional Comments: No falls in the past 6 months; Patient is blind and requires assistance for ambulating in living room due to new set-up.        Objective   Vision: Impaired  Vision Exceptions: Legally blind  Hearing: Exceptions to [facility]  Hearing Exceptions: Hard of hearing/hearing concerns    Orientation  Overall Orientation Status: Within Functional Limits  Observation/Palpation  Posture: Fair  Observation: rounded shoulders  Balance  Sitting Balance: Supervision  Standing Balance: Contact guard assistance  Standing Balance  Time: up to 1 min  Activity: standing at EOB to get orthostatic BP  Functional Mobility  Functional Mobility Comments: Not assessed the date due to hypotension  ADL  Feeding: Setup;Verbal cueing(cutting up food for low vision,)  LE Dressing: Supervision(Patient adjusting socks with SBA-supervision)  Tone RUE  RUE Tone: Normotonic  Tone LUE  LUE Tone: Normotonic  Coordination  Movements Are Fluid And Coordinated: Yes     Bed mobility  Supine to Sit: Stand by assistance  Sit to Supine: Unable to assess  Scooting: Stand by assistance  Transfers  Sit to stand: Supervision  Stand to sit: Supervision     Cognition  Overall Cognitive Status: WFL  Perception  Overall Perceptual Status: WFL     Sensation  Overall Sensation Status: Impaired  Light Touch: (reports decreased sensation to finger tips)        LUE AROM (degrees)  LUE AROM : WFL  RUE AROM (degrees)  RUE AROM : WFL                      Plan   Plan  Times per week: 3-5x  Times per day: Daily  Current Treatment Recommendations: Patient/Caregiver Education & Training, Functional Mobility Training, Endurance Training, Safety Education & Training, Self-Care / ADL, Strengthening    G-Code     OutComes Score                                                  AM-PAC Score        AM-PAC Inpatient Daily Activity Raw Score: 18 (08/01/20 1217)  AM-PAC Inpatient ADL T-Scale Score : 38.66 (08/01/20 1217)  ADL Inpatient CMS 0-100% Score: 46.65 (08/01/20 1217)  ADL Inpatient CMS G-Code Modifier : CK (08/01/20 1217)    Goals  Short term goals  Time Frame for Short term goals: by discharge ( 1 week, 8/8/20)  Short term goal 1: Complete into bathroom mobility using cane with SBA  Short term goal 2: Toilet transfers with SBA  Short term goal 3: Complete LB dressing with set-up A       Therapy Time   Individual Concurrent Group Co-treatment   Time In 0837         Time Out 0940         Minutes 63         Timed Code Treatment Minutes: [id number] [facility], OTR/L

## 2020-08-01 NOTE — ED NOTES
Patient assisted to bedside commode. Denies dizziness at this time.       Jeri Grimes RN  07/31/20 7112

## 2020-08-01 NOTE — PROGRESS NOTES
Physician Progress Note      Milrded Cisneros  CSN #:                  [id number]  :                       1960  ADMIT DATE:       2020 6:57 PM  [address] DATE:  RESPONDING  PROVIDER #:        Deandre Davis MD          QUERY TEXT:    Pt admitted with hypotension d/t dialysis. Pt noted to have h/o chf and home   oxygen baseline 2-3 L continuous . If possible, please document in the   progress notes and discharge summary if you are evaluating and/or treating any   of the following: The medical record reflects the following:  Risk Factors: chf, esrd  Clinical Indicators: oxygen noted on home medication list with baseline 2-3   lpm continuous  Treatment: 3-4 lpm NC    Thank you for your assistance,  Ida Herron RN,BSN,CCD,CRCR  [phone]  Options provided:  -- Chronic respiratory failure with hypoxia  -- Chronic respiratory failure with hypercapnia  -- Other - I will add my own diagnosis  -- Disagree - Clinically unable to determine / Unknown  -- Refer to Clinical Documentation Reviewer    PROVIDER RESPONSE TEXT:    This patient has chronic respiratory failure with hypoxia.     Query created by: Lila Matthew on 2020 9:19 AM      Electronically signed by:  Deandre Davis MD 2020 1:55 PM

## 2020-08-01 NOTE — ED NOTES
Patient Provided a turkey sandwich and vanilla pudding at this time.       Sabrina Baez RN  08/01/20 4508

## 2020-08-01 NOTE — ED PROVIDER NOTES
I independently evaluated and obtained a history and physical on [employer]. All diagnostic, treatment, and disposition assistants were made to myself in conjunction the advanced practice provider. For further details of this patient's emergency department encounter, please see the advanced practice provider's documentation. History: The patient is a 20-year-old male with a history of end-stage renal disease on Monday Wednesday Friday dialysis as well as previous episodes of hypotension after dialysis requiring Midodrine who presents due to fatigue and lightheadedness after dialysis today. Reports after dialysis he stood up and got dizzy. He denies any vertigo or room spinning but describes his dizziness as presyncope. He denies any loss of consciousness but does report that the lightheadedness and weakness has made it difficult for him to ambulate and cause him to almost fall. He reports that he has a persistent migraine headache however reports that this was present prior to his hypotension. He denies any fever nausea vomiting diarrhea or infectious symptoms. Physician Exam: Chronically ill-appearing elderly  male. Regular rate and rhythm. Intact distal pulses. Patient unable to stand and ambulate due to extreme lightheadedness. MDM:    The Ekg interpreted by me shows  normal sinus rhythm with a rate of 78  Axis is   Normal  QTc is  485ms  Intervals and Durations are unremarkable. ST Segments: no acute change  No significant change from prior EKG dated 4/14/2020    Patient was given IV fluids and low-dose amiodarone while his hypotension improves it does not resolve and the patient remains symptomatic when standing and ambulating. Nephrology is consulted. Based on the patient's continued symptoms and borderline blood pressure he is admitted for further monitoring and care. He expresses understanding and agreement with this plan. FINAL IMPRESSION      1.  Hypotension, unspecified hypotension type    2. ESRD (end stage renal disease) on dialysis ([facility] [address]    3.  Jolly Villaseñor MD  08/01/20 4524

## 2020-08-02 LAB
ALBUMIN SERPL-MCNC: 3.5 G/DL (ref 3.4–5)
ANION GAP SERPL CALCULATED.3IONS-SCNC: 16 MMOL/L (ref 3–16)
BUN BLDV-MCNC: 62 MG/DL (ref 7–20)
CALCIUM SERPL-MCNC: 9.4 MG/DL (ref 8.3–10.6)
CHLORIDE BLD-SCNC: 96 MMOL/L (ref 99–110)
CO2: 22 MMOL/L (ref 21–32)
CREAT SERPL-MCNC: 8.4 MG/DL (ref 0.8–1.3)
GFR AFRICAN AMERICAN: 8
GFR NON-AFRICAN AMERICAN: 6
GLUCOSE BLD-MCNC: 110 MG/DL (ref 70–99)
PHOSPHORUS: 4.7 MG/DL (ref 2.5–4.9)
POTASSIUM SERPL-SCNC: 4.4 MMOL/L (ref 3.5–5.1)
SODIUM BLD-SCNC: 134 MMOL/L (ref 136–145)

## 2020-08-02 PROCEDURE — 80069 RENAL FUNCTION PANEL: CPT

## 2020-08-02 PROCEDURE — 6370000000 HC RX 637 (ALT 250 FOR IP): Performed by: HOSPITALIST

## 2020-08-02 PROCEDURE — 94761 N-INVAS EAR/PLS OXIMETRY MLT: CPT

## 2020-08-02 PROCEDURE — 6360000002 HC RX W HCPCS: Performed by: HOSPITALIST

## 2020-08-02 PROCEDURE — 36415 COLL VENOUS BLD VENIPUNCTURE: CPT

## 2020-08-02 PROCEDURE — 2580000003 HC RX 258: Performed by: INTERNAL MEDICINE

## 2020-08-02 PROCEDURE — 96372 THER/PROPH/DIAG INJ SC/IM: CPT

## 2020-08-02 PROCEDURE — 2700000000 HC OXYGEN THERAPY PER DAY

## 2020-08-02 PROCEDURE — 2060000000 HC ICU INTERMEDIATE R&B

## 2020-08-02 PROCEDURE — 2580000003 HC RX 258: Performed by: HOSPITALIST

## 2020-08-02 PROCEDURE — G0378 HOSPITAL OBSERVATION PER HR: HCPCS

## 2020-08-02 RX ORDER — SODIUM CHLORIDE, SODIUM GLUCONATE, SODIUM ACETATE, POTASSIUM CHLORIDE AND MAGNESIUM CHLORIDE 526; 502; 368; 37; 30 MG/100ML; MG/100ML; MG/100ML; MG/100ML; MG/100ML
1000 INJECTION, SOLUTION INTRAVENOUS CONTINUOUS
Status: DISCONTINUED | OUTPATIENT
Start: 2020-08-02 | End: 2020-08-02

## 2020-08-02 RX ADMIN — HEPARIN SODIUM 5000 UNITS: 5000 INJECTION INTRAVENOUS; SUBCUTANEOUS at 05:16

## 2020-08-02 RX ADMIN — DOCUSATE SODIUM 100 MG: 100 CAPSULE, LIQUID FILLED ORAL at 10:17

## 2020-08-02 RX ADMIN — HEPARIN SODIUM 5000 UNITS: 5000 INJECTION INTRAVENOUS; SUBCUTANEOUS at 13:05

## 2020-08-02 RX ADMIN — ASPIRIN 162 MG: 81 TABLET, COATED ORAL at 10:17

## 2020-08-02 RX ADMIN — CITALOPRAM HYDROBROMIDE 40 MG: 20 TABLET ORAL at 10:17

## 2020-08-02 RX ADMIN — ATORVASTATIN CALCIUM 40 MG: 40 TABLET, FILM COATED ORAL at 10:17

## 2020-08-02 RX ADMIN — SEVELAMER CARBONATE 3200 MG: 800 TABLET, FILM COATED ORAL at 20:29

## 2020-08-02 RX ADMIN — Medication 10 ML: at 10:20

## 2020-08-02 RX ADMIN — SEVELAMER CARBONATE 3200 MG: 800 TABLET, FILM COATED ORAL at 13:04

## 2020-08-02 RX ADMIN — MIDODRINE HYDROCHLORIDE 10 MG: 5 TABLET ORAL at 17:01

## 2020-08-02 RX ADMIN — SODIUM CHLORIDE, SODIUM GLUCONATE, SODIUM ACETATE, POTASSIUM CHLORIDE AND MAGNESIUM CHLORIDE 1000 ML: 526; 502; 368; 37; 30 INJECTION, SOLUTION INTRAVENOUS at 13:03

## 2020-08-02 RX ADMIN — MIDODRINE HYDROCHLORIDE 10 MG: 5 TABLET ORAL at 10:17

## 2020-08-02 RX ADMIN — TRAZODONE HYDROCHLORIDE 150 MG: 50 TABLET ORAL at 20:29

## 2020-08-02 RX ADMIN — SEVELAMER CARBONATE 3200 MG: 800 TABLET, FILM COATED ORAL at 10:17

## 2020-08-02 RX ADMIN — MIDODRINE HYDROCHLORIDE 10 MG: 5 TABLET ORAL at 13:04

## 2020-08-02 RX ADMIN — Medication 10 ML: at 20:29

## 2020-08-02 RX ADMIN — HEPARIN SODIUM 5000 UNITS: 5000 INJECTION INTRAVENOUS; SUBCUTANEOUS at 20:29

## 2020-08-02 RX ADMIN — DOCUSATE SODIUM 100 MG: 100 CAPSULE, LIQUID FILLED ORAL at 20:29

## 2020-08-02 ASSESSMENT — PAIN SCALES - GENERAL: PAINLEVEL_OUTOF10: 0

## 2020-08-02 NOTE — PLAN OF CARE
Problem: Falls - Risk of:  Goal: Will remain free from falls  Description: Will remain free from falls  8/2/2020 0827 by Yamilet Cervantes RN  Outcome: Ongoing  8/2/2020 0827 by Yamilet Cervantes RN  Outcome: Ongoing  Goal: Absence of physical injury  Description: Absence of physical injury  8/2/2020 0827 by Yamilet Cervantes RN  Outcome: Ongoing  8/2/2020 0827 by Yamilet Cervantes RN  Outcome: Ongoing

## 2020-08-02 NOTE — PROGRESS NOTES
Progress Note    HISTORY     CC:  Dizziness           We are following for ESRD       Subjective/   HPI:  Patient is doing ok. BP stable. Feeling ok. No new complaints     ROS:  Constitutional:  No fevers, No Chills, + weakness  Cardiovascular:  No palpations, no edema  Respiratory:  No wheezing, no cough    Social Hx:  No family at bedside     Past Medical and Surgical History:  - Reviewed, no changes     EXAM       Objective/     Vitals:    08/02/20 0409 08/02/20 0450 08/02/20 0906 08/02/20 1012   BP: (!) 93/46   108/69   Pulse: 63   67   Resp: 16   16   Temp: 98.3 °F (36.8 °C)   97.5 °F (36.4 °C)   TempSrc: Oral      SpO2: 98%  98% 98%   Weight:  238 lb 1.6 oz (108 kg)     Height:         24HR INTAKE/OUTPUT:      Intake/Output Summary (Last 24 hours) at 8/2/2020 1113  Last data filed at 8/2/2020 1050  Gross per 24 hour   Intake 1210 ml   Output 0 ml   Net 1210 ml     Constitutional:  Alert, awake, no apparent distress  Eyes:  Pupils reactive, sclera clear   Neck:  Normal thyroid, no masses   Cardiovascular:  Regular, no rub  Respiratory:  No distress, no wheezing  Psychiatry:  Appropriate mood/affect, alert  Abdomen: +bs, soft, nt, no masses   Musculoskeletal: No LE edema, no clubbing   Lymphatics:  No LAD in neck, no supraclavicular nodes       MEDICAL DECISION MAKING       Data/  Recent Labs     07/31/20 1959   WBC 10.0   HGB 14.7   HCT 45.2   MCV 95.9        Recent Labs     07/31/20  1924 08/01/20  0455 08/02/20  0445   * 134* 134*   K 4.8 4.3 4.4   CL 92* 98* 96*   CO2 25 21 22   GLUCOSE 105* 129* 110*   PHOS  --   --  4.7   BUN 29* 42* 62*   CREATININE 4.9* 6.5* 8.4*   LABGLOM 12* 9* 6*   GFRAA 15* 11* 8*       Assessment/     ESRD:  On HD mwf at [facility]. Working right forearm AVF. Target of 107.5 kg with average IDWG of 2.5 - 4 kg      Hypotension:  Chronic. Hb stable. On midodrine already.       Dizziness:  Seems to be related to low BP.   Based on his exam I would say the dialysis unit has his target weight set too low    Plan/     Discussed with patient, I do not think he can handle > 2.5 liters of UF in a dialysis session  Will give 500 cc IV fluid today  As long as BP stays stable we can discharge him  He needs a new target weight at the dialysis unit  I would stay 109 kg and we will monitor his volume status closely   -----------------------------  Aurelio Song M.D.   [facility]

## 2020-08-02 NOTE — PROGRESS NOTES
appearance: alert and cooperative with exam, legally blind (left detached retina and right post cataract surgery)  Lungs: clear to auscultation bilaterally  Heart: regular rate and rhythm, S1, S2 normal, no murmur, click, rub or gallop  Abdomen: soft, non-tender; bowel sounds normal; no masses,  no organomegaly  Extremities: right forearm fistula intact, neurovastular intact  Neurologic: No obvious focal neurologic deficits. Assessment and Plan:   1. Hypotension associated with hemodialysis:  Now appears refractory to midodrine. Continues on current dose of midodrine at 10 mg TID. Seen by his usual nephrologist, Dr. Andrew Farley, giving 500 cc IV fluid today. 2. ESRD requiring hemodialysis for the past 10 years via right forearm AV fistula. Renal failure secondary to diabetes. He does not currently require insulin due to renal failure. He is normally followed by Dr. Andrew Farley. Renal diet. Continues on sevelamer 3,200 mg TID. 3. Paroxysmal atrial fibrillation:  Sinus rhythm. Watchman device. Previously anticoagulated with warfarin which was discontinued due to bleeding two years ago. No rate controlling medication required. Continues on aspirin 162 mg daily. 4. Chronic respiratory failure with hypoxia on supplemental oxygen at 2-4 LPM.    5. Type 2 Diabetes (HgbA1c 5.5%): No need for sliding scale insulin. 6. Chronic diastolic heart failure (EF 55% by echo Jan 2019): stable. No ACE inhibitor due to renal failure. 7. Dyslipidemia:  Continues on atorvastatin 40 mg nightly. 8. Depression and insomnia:  Continues on citalopram 40 mg daily and trazodone 150 mg nightly.      Advance Directive: Full Code  DVT prophylaxis with heparin 5,000 units sub-Q TID. Discharge planning: Monday, August 3. Dialysis targets and dry weight adjusted by nephrology. Usual dialysis scheduled for M-W-Fr and he can go to outpatient dialysis. He lives in a mobile home with four steps.   Baseline is dyspnea when walking up the steps to get into his house even with oxygen.         Bam De Guzman [id number], MD  [facility]

## 2020-08-03 LAB
ALBUMIN SERPL-MCNC: 3.4 G/DL (ref 3.4–5)
ANION GAP SERPL CALCULATED.3IONS-SCNC: 17 MMOL/L (ref 3–16)
BUN BLDV-MCNC: 83 MG/DL (ref 7–20)
CALCIUM SERPL-MCNC: 9.1 MG/DL (ref 8.3–10.6)
CHLORIDE BLD-SCNC: 95 MMOL/L (ref 99–110)
CO2: 21 MMOL/L (ref 21–32)
CREAT SERPL-MCNC: 9.9 MG/DL (ref 0.8–1.3)
GFR AFRICAN AMERICAN: 6
GFR NON-AFRICAN AMERICAN: 5
GLUCOSE BLD-MCNC: 105 MG/DL (ref 70–99)
HCT VFR BLD CALC: 37.9 % (ref 40.5–52.5)
HEMOGLOBIN: 12.6 G/DL (ref 13.5–17.5)
MCH RBC QN AUTO: 31 PG (ref 26–34)
MCHC RBC AUTO-ENTMCNC: 33.1 G/DL (ref 31–36)
MCV RBC AUTO: 93.7 FL (ref 80–100)
PDW BLD-RTO: 15.3 % (ref 12.4–15.4)
PHOSPHORUS: 4.9 MG/DL (ref 2.5–4.9)
PLATELET # BLD: 237 K/UL (ref 135–450)
PMV BLD AUTO: 6.7 FL (ref 5–10.5)
POTASSIUM SERPL-SCNC: 5.1 MMOL/L (ref 3.5–5.1)
RBC # BLD: 4.05 M/UL (ref 4.2–5.9)
SODIUM BLD-SCNC: 133 MMOL/L (ref 136–145)
WBC # BLD: 7.4 K/UL (ref 4–11)

## 2020-08-03 PROCEDURE — 85027 COMPLETE CBC AUTOMATED: CPT

## 2020-08-03 PROCEDURE — 6370000000 HC RX 637 (ALT 250 FOR IP): Performed by: HOSPITALIST

## 2020-08-03 PROCEDURE — G0378 HOSPITAL OBSERVATION PER HR: HCPCS

## 2020-08-03 PROCEDURE — 96372 THER/PROPH/DIAG INJ SC/IM: CPT

## 2020-08-03 PROCEDURE — 6370000000 HC RX 637 (ALT 250 FOR IP): Performed by: NURSE PRACTITIONER

## 2020-08-03 PROCEDURE — 6360000002 HC RX W HCPCS: Performed by: HOSPITALIST

## 2020-08-03 PROCEDURE — 2060000000 HC ICU INTERMEDIATE R&B

## 2020-08-03 PROCEDURE — 94761 N-INVAS EAR/PLS OXIMETRY MLT: CPT

## 2020-08-03 PROCEDURE — 6370000000 HC RX 637 (ALT 250 FOR IP): Performed by: INTERNAL MEDICINE

## 2020-08-03 PROCEDURE — 2700000000 HC OXYGEN THERAPY PER DAY

## 2020-08-03 PROCEDURE — 2580000003 HC RX 258: Performed by: HOSPITALIST

## 2020-08-03 PROCEDURE — 36415 COLL VENOUS BLD VENIPUNCTURE: CPT

## 2020-08-03 PROCEDURE — 90935 HEMODIALYSIS ONE EVALUATION: CPT

## 2020-08-03 PROCEDURE — 80069 RENAL FUNCTION PANEL: CPT

## 2020-08-03 PROCEDURE — 96374 THER/PROPH/DIAG INJ IV PUSH: CPT

## 2020-08-03 RX ORDER — BUTALBITAL, ACETAMINOPHEN AND CAFFEINE 50; 325; 40 MG/1; MG/1; MG/1
1 TABLET ORAL EVERY 4 HOURS PRN
Status: DISCONTINUED | OUTPATIENT
Start: 2020-08-03 | End: 2020-08-05 | Stop reason: HOSPADM

## 2020-08-03 RX ORDER — MIDODRINE HYDROCHLORIDE 10 MG/1
10 TABLET ORAL
Qty: 90 TABLET | Refills: 3 | Status: SHIPPED | OUTPATIENT
Start: 2020-08-03

## 2020-08-03 RX ORDER — CHOLECALCIFEROL (VITAMIN D3) 10 MCG
1 TABLET ORAL DAILY
Status: DISCONTINUED | OUTPATIENT
Start: 2020-08-03 | End: 2020-08-05 | Stop reason: HOSPADM

## 2020-08-03 RX ADMIN — CITALOPRAM HYDROBROMIDE 40 MG: 20 TABLET ORAL at 08:18

## 2020-08-03 RX ADMIN — DOCUSATE SODIUM 100 MG: 100 CAPSULE, LIQUID FILLED ORAL at 08:18

## 2020-08-03 RX ADMIN — TRAZODONE HYDROCHLORIDE 150 MG: 50 TABLET ORAL at 20:14

## 2020-08-03 RX ADMIN — BUTALBITAL, ACETAMINOPHEN, AND CAFFEINE 1 TABLET: 50; 325; 40 TABLET ORAL at 20:14

## 2020-08-03 RX ADMIN — ONDANSETRON 4 MG: 2 INJECTION INTRAMUSCULAR; INTRAVENOUS at 14:48

## 2020-08-03 RX ADMIN — SEVELAMER CARBONATE 3200 MG: 800 TABLET, FILM COATED ORAL at 08:17

## 2020-08-03 RX ADMIN — SEVELAMER CARBONATE 3200 MG: 800 TABLET, FILM COATED ORAL at 20:14

## 2020-08-03 RX ADMIN — HEPARIN SODIUM 5000 UNITS: 5000 INJECTION INTRAVENOUS; SUBCUTANEOUS at 06:16

## 2020-08-03 RX ADMIN — Medication 10 ML: at 14:48

## 2020-08-03 RX ADMIN — MIDODRINE HYDROCHLORIDE 10 MG: 5 TABLET ORAL at 13:50

## 2020-08-03 RX ADMIN — HEPARIN SODIUM 5000 UNITS: 5000 INJECTION INTRAVENOUS; SUBCUTANEOUS at 13:50

## 2020-08-03 RX ADMIN — NEPHROCAP 1 MG: 1 CAP ORAL at 17:54

## 2020-08-03 RX ADMIN — ATORVASTATIN CALCIUM 40 MG: 40 TABLET, FILM COATED ORAL at 08:18

## 2020-08-03 RX ADMIN — SEVELAMER CARBONATE 3200 MG: 800 TABLET, FILM COATED ORAL at 13:50

## 2020-08-03 RX ADMIN — Medication 10 ML: at 20:15

## 2020-08-03 RX ADMIN — ACETAMINOPHEN 650 MG: 325 TABLET ORAL at 12:00

## 2020-08-03 RX ADMIN — DOCUSATE SODIUM 100 MG: 100 CAPSULE, LIQUID FILLED ORAL at 20:14

## 2020-08-03 RX ADMIN — HEPARIN SODIUM 5000 UNITS: 5000 INJECTION INTRAVENOUS; SUBCUTANEOUS at 20:19

## 2020-08-03 RX ADMIN — MIDODRINE HYDROCHLORIDE 10 MG: 5 TABLET ORAL at 17:54

## 2020-08-03 RX ADMIN — ASPIRIN 162 MG: 81 TABLET, COATED ORAL at 08:17

## 2020-08-03 RX ADMIN — MIDODRINE HYDROCHLORIDE 10 MG: 5 TABLET ORAL at 08:18

## 2020-08-03 ASSESSMENT — PAIN DESCRIPTION - FREQUENCY
FREQUENCY: INTERMITTENT
FREQUENCY: INTERMITTENT

## 2020-08-03 ASSESSMENT — PAIN SCALES - GENERAL
PAINLEVEL_OUTOF10: 3
PAINLEVEL_OUTOF10: 3
PAINLEVEL_OUTOF10: 1
PAINLEVEL_OUTOF10: 8

## 2020-08-03 NOTE — DISCHARGE INSTR - COC
 HLD (hyperlipidemia) E78.5    Blindness of left eye with low vision in contralateral eye H54.40, H54.50    History of anemia due to CKD N18.9, Z86.2    Bilateral LE lymphedema and venous stasis dermatitis I87.2    Paroxysmal atrial fibrillation (HCC) I48.0    ESRD on hemodialysis ([facility]) N18.6, Z99.2    DM2 (diabetes mellitus, type 2) ([facility]) E11.9    Chronic diastolic (congestive) heart failure ([facility]) I50.32    Typical atrial flutter ([facility]) I48.3    Suspected sleep apnea R29.818    Renovascular hypertension I15.0    Rosario's esophagus K22.70    CAD (coronary artery disease) I25.10    Chronic hypoxemic respiratory failure on 3 L home O2 J96.11    Depression F32.9    SIRS (systemic inflammatory response syndrome) ([facility]) R65.10    Former smoker Z87.891    Pulmonary edema J81.1    Splenic calcification D73.89    Tricuspid regurgitation I07.1    Leukocytosis D72.829    Hyperglycemia R73.9    Osteomyelitis of great toe of right foot ([facility]) M86.9    Essential hypertension I10    Hypotension I95.9       Isolation/Infection:   Isolation          No Isolation        Patient Infection Status     Infection Onset Added Last Indicated Last Indicated By Review Planned Expiration Resolved Resolved By    MRSA 04/17/20 04/19/20 04/17/20 Culture, Tissue        Resolved    MRSA 04/17/20 04/18/20 04/17/20 Culture, Tissue   04/18/20 Misty Thurston RN          Nurse Assessment:  Last Vital Signs: /84   Pulse 76   Temp 97.2 °F (36.2 °C)   Resp 18   Ht 5' 9\" (1.753 m)   Wt 241 lb 10 oz (109.6 kg)   SpO2 96%   BMI 35.68 kg/m²     Last documented pain score (0-10 scale): Pain Level: 3  Last Weight:   Wt Readings from Last 1 Encounters:   08/03/20 241 lb 10 oz (109.6 kg)     Mental Status:  {IP PT MENTAL STATUS:20030}    IV Access:  { [facility] IV ACCESS:697431752}    Nursing Mobility/ADLs:  Walking   {CHP DME JZJE:827263461}  Transfer  {CHP DME QRIC:927724642}  Bathing  {CHP DME BUOU:472836506}  Dressing  {CHP DME GJY}  Toileting  {[facility] DME EMWF:384505513}  Feeding  {[facility] DME GJEZ:245410486}  Med Admin  {[facility] DME VPRT:400412839}  Med Delivery   { [facility] MED Delivery:278927210}    Wound Care Documentation and Therapy:        Elimination:  Continence:   · Bowel: {YES / GT:92617}  · Bladder: {YES / RO:37997}  Urinary Catheter: {Urinary Catheter:125627873}   Colostomy/Ileostomy/Ileal Conduit: {YES / TD:50855}       Date of Last BM: ***    Intake/Output Summary (Last 24 hours) at 8/3/2020 1555  Last data filed at 8/3/2020 1448  Gross per 24 hour   Intake 1400 ml   Output 1511 ml   Net -111 ml     I/O last 3 completed shifts: In: 7783 [P.O.:950;  I.V.:510]  Out: 1511     Safety Concerns:     [employer] Safety Concerns:406954385}    Impairments/Disabilities:      {[facility] Impairments/Disabilities:895647093}    Nutrition Therapy:  Current Nutrition Therapy:   [employer] Diet List:356835707}    Routes of Feeding: {[facility] DME Other Feedings:734388381}  Liquids: {Slp liquid thickness:98670}  Daily Fluid Restriction: {P DME Yes amt example:085534053}  Last Modified Barium Swallow with Video (Video Swallowing Test): {Done Not Done KFWR:653148935}    Treatments at the Time of Hospital Discharge:   Respiratory Treatments: ***  Oxygen Therapy:  {Therapy; copd oxygen:70034}  Ventilator:    { CC Vent GRMI:876967581}    Rehab Therapies: {THERAPEUTIC INTERVENTION:0078309968}  Weight Bearing Status/Restrictions: [employer] Weight Bearin}  Other Medical Equipment (for information only, NOT a DME order):  {EQUIPMENT:062423395}  Other Treatments: ***    Patient's personal belongings (please select all that are sent with patient):  {[facility] DME Belongings:827871764}    RN SIGNATURE:  {Esignature:416635871}    CASE MANAGEMENT/SOCIAL WORK SECTION    Inpatient Status Date: ***    Readmission Risk Assessment Score:  Readmission Risk              Risk of Unplanned Readmission:        0           Discharging to Facility/ Agency   · Name: [facility]   · Address:  · Phone:  · Fax:    Dialysis Facility (if applicable)   · Name:  · Address:  · Dialysis Schedule:  · Phone:  · Fax:    / signature: Electronically signed by Garfield Mix RN on 8/3/20 at 3:55 PM EDT    PHYSICIAN SECTION    Prognosis: Fair    Condition at Discharge: Stable    Rehab Potential (if transferring to Rehab): Good    Recommended Labs or Other Treatments After Discharge: ***  Recommended Follow-up, Labs or Other Treatments After Discharge:    Skilled Nursing/PT/OT     F/u with PCP in one week   Continue dialysis per outpatient schedule        Physician Certification: I certify the above information and transfer of Silvestre Shelton  is necessary for the continuing treatment of the diagnosis listed and that he requires Home Care for less 30 days.      Update Admission H&P: No change in H&P    PHYSICIAN SIGNATURE:  Electronically signed by Marilee Marcelino MD  on 8/5/20 at 1:43 PM EDT

## 2020-08-03 NOTE — PROGRESS NOTES
Pt complains of every movment. Complains of the walk to the bathroom and states arthritis, bones popping, and sob. Pt states that just moving is a workout that he can barely tolerate. Pt does talk continuously and jokes and laughs, has great spirit. Pt eating heartedly, great appetite and great desire for tv. Pt operates the phone and remotes very well.

## 2020-08-03 NOTE — PLAN OF CARE
Problem: Nausea/Vomiting:  Goal: Absence of nausea/vomiting  Description: Absence of nausea/vomiting pt complained of n/v zofran given will nubia   Note: Pt med with zofran   nausea diminished but headache still as it was earlier. 8/10. Pt states he takes advil at home for these headaches from hd. Attending updated.

## 2020-08-03 NOTE — PROGRESS NOTES
Progress Note    HISTORY     CC:  Dizziness           We are following for ESRD         Subjective/     The patient was seen and examined; he was resting comfortably today with no acute events noted overnight. ROS: No fever or chills. Social: No family at bedside. EXAM       Objective/     Vitals:    08/03/20 0501 08/03/20 0800 08/03/20 0854 08/03/20 1345   BP:  113/71 (!) 104/55 125/84   Pulse:  87 81 76   Resp:  20 18 18   Temp:  97.6 °F (36.4 °C) 97.6 °F (36.4 °C) 97.2 °F (36.2 °C)   TempSrc:       SpO2:  95%  96%   Weight: 245 lb 6.4 oz (111.3 kg)  244 lb 11.4 oz (111 kg)    Height:         24HR INTAKE/OUTPUT:      Intake/Output Summary (Last 24 hours) at 8/3/2020 1401  Last data filed at 8/3/2020 0832  Gross per 24 hour   Intake 1450 ml   Output 0 ml   Net 1450 ml     Constitutional: Resting comfortably. Neck:  Normal thyroid, no masses   Cardiovascular:  Regular, no rub  Respiratory:  No distress, no wheezing  Abdomen: +bs, soft, nt, no masses   Musculoskeletal: No LE edema, no clubbing       MEDICAL DECISION MAKING       Data/  Recent Labs     07/31/20 1959 08/03/20  0731   WBC 10.0 7.4   HGB 14.7 12.6*   HCT 45.2 37.9*   MCV 95.9 93.7    237     Recent Labs     08/01/20  0455 08/02/20  0445 08/03/20  0731   * 134* 133*   K 4.3 4.4 5.1   CL 98* 96* 95*   CO2 21 22 21   GLUCOSE 129* 110* 105*   PHOS  --  4.7 4.9   BUN 42* 62* 83*   CREATININE 6.5* 8.4* 9.9*   LABGLOM 9* 6* 5*   GFRAA 11* 8* 6*       Assessment/     ESRD:  On HD mwf at [facility]. Working right forearm AVF. Target of 107.5 kg with average IDWG of 2.5 - 4 kg      Hypotension:  Chronic. Hb stable. On midodrine already.       Dizziness:  Seems to be related to low BP.   Based on his exam I would say the dialysis unit has his target weight set too low    Plan/     Continue hemodialysis per MWF schedule   Limit Ultrafiltration to 2.5 liters in a dialysis session  Target weight to be determined at the dialysis unit

## 2020-08-03 NOTE — PROGRESS NOTES
Rowan Brown MD   Patient: Ruth Taylor   0'\"   8/3/20 3:58 PM   [facility]  or Facility: [facility] From: [facility], [facility] RE: Dakota Leongutierrez 1960 RM: [phone] pt states he is too weak to go home. when he was in hd he called me for Tylenol stated he had a terrible headache. then he was very nauseated during hd and could not finish. when he returned he vomited and I gave him Zofran. pt appears drained. and sleepy. please review and advise. ty Need Callback: NO CALLBACK REQ C4 PROGRESSIVE CARE  Unread   See orders.  Pt to be monitored another day

## 2020-08-03 NOTE — CARE COORDINATION
CASE MANAGEMENT INITIAL ASSESSMENT      Reviewed chart and completed assessment with:  Patient   Explained Case Management role/services. Primary contact information: Vijay Calvo    Admit date/status: 7/31/20 Observation  Diagnosis: hypotension  Is this a Readmission?: no    Insurance: [facility] [email or web address] required for SNF - Y        3 night stay required - N    Living arrangements, Adls, care needs, prior to admission: lives in a mobile home with his step daughter     Transportation: step daughter     Durable Medical Equipment at home: Walker_X_Cane_X_RTS__ BSC__Shower Chair__  02_X ([employer])_ HHN__ CPAP__  BiPap__  Hospital Bed__ W/C___ Other__________    Services in the home and/or outpatient, prior to admission: active with [facility]     Dialysis Facility (if applicable)   · Name: [facility]  · Address:  · Dialysis Schedule: MWF   · Phone:  · Fax:    PT/OT recs: home care     Hospital Exemption Notification (HEN): not initiated     Barriers to discharge: none    Plan/comments: Patient states his step daughter helps out a lot around the house and drives him to where he needs to be. He mostly gets around with a cane. He states he still gets home care through the home care that was set up for him the last time he was here. He would like to resume with them. Per chart review, he was set up with [facility]. Will have [facility] services resumed at discharge.       ECOC on chart for MD signature          CASE MANAGEMENT DISCHARGE SUMMARY      Discharge to: home with [facility]       Transportation: private      Confirmed discharge plan with: patient/family/RN/Gala with [facility]     Patient: yes     Family, name and contact number: Shraddha He [phone]     Facility/Agency, name:  [facility] faxed n/a    Phone number for report to facility: [phone]     RN, name: Kylie     Note: Discharging nurse to complete MISSY, reconcile AVS, and place final copy with

## 2020-08-03 NOTE — PROGRESS NOTES
insight  al bilaterally       Labs:   Recent Labs     07/31/20 1959 08/03/20  0731   WBC 10.0 7.4   HGB 14.7 12.6*   HCT 45.2 37.9*    237     Recent Labs     08/01/20  0455 08/02/20  0445 08/03/20  0731   * 134* 133*   K 4.3 4.4 5.1   CL 98* 96* 95*   CO2 21 22 21   BUN 42* 62* 83*   CREATININE 6.5* 8.4* 9.9*   CALCIUM 9.3 9.4 9.1   PHOS  --  4.7 4.9     No results for input(s): AST, ALT, BILIDIR, BILITOT, ALKPHOS in the last 72 hours. No results for input(s): INR in the last 72 hours. Recent Labs     07/31/20 1924   TROPONINI 0.04*       Urinalysis:      Lab Results   Component Value Date    NITRU Negative 03/30/2015    WBCUA >100 03/30/2015    BACTERIA 4+ 09/16/2010    RBCUA >100 03/30/2015    BLOODU LARGE 03/30/2015    SPECGRAV 1.020 03/30/2015    GLUCOSEU 100 03/30/2015    GLUCOSEU 100 09/16/2010       Radiology:  XR CHEST PORTABLE   Final Result   Bilateral lower lung airspace disease, greater on the right, with bilateral   pleural effusions. Pulmonary edema would be the primary consideration,   although pneumonia or scarring would be considered as well, especially the   right. The distribution of the airspace disease is very similar when   compared to the 03/18/2020 examination. Assessment/Plan:    Active Hospital Problems    Diagnosis    Chronic diastolic (congestive) heart failure (HCC) [I50.32]     Priority: High    Hypotension [I95.9]    DM2 (diabetes mellitus, type 2) ([address] [E11.9]    Paroxysmal atrial fibrillation ([address] [I48.0]     Hypotension associated with hemodialysis: improved with IVF. Midodrine dose increased to TID. Possible DC today if BP remains stable after HD.       ESRD : on HD M-W-F. Nephrology assisting with inpatient dialysis needs     Paroxysmal atrial fibrillation: sinus rhythm. Has watchman device. Previously anticoagulated with warfarin which was discontinued due to bleeding two years ago per report. [employer] rate controlling medication required.  Continue on aspirin 162 mg daily. Chronic respiratory failure with hypoxia on supplemental oxygen at 2-4 LPM.    At baseline    Type 2 Diabetes (HgbA1c 5.5%):   diet controlled. Chronic diastolic heart failure : stable.  No ACE inhibitor due to renal failure.        Dyslipidemia: Continue statin     Depression and insomnia: Mood stable. Continue home meds     DVT Prophylaxis: Heparin  Diet: DIET RENAL;  Code Status: Full Code    PT/OT Eval Status: Ordered      Dispo -possible discharge today after dialysis if BP stable and okay with nephrology.   Pending PT OT eval   Brenna Cardoza MD

## 2020-08-03 NOTE — PLAN OF CARE
Problem: Falls - Risk of:  Goal: Will remain free from falls  Description: Will remain free from falls  8/2/2020 2141 by Vish Vasquez RN  Note: Resting quietly in bed. Bed in low position, wheels locked. Side rails up x2. Call light in reach and bed alarm remains activated.     8/2/2020 0827 by Kian Roa RN  Outcome: Ongoing  8/2/2020 0827 by Kian Roa RN  Outcome: Ongoing

## 2020-08-03 NOTE — PLAN OF CARE
Problem: Falls - Risk of:  Goal: Will remain free from falls  Description: Will remain free from falls  8/3/2020 0943 by Mary Calderón RN  Outcome: Ongoing  8/2/2020 2141 by Deloris Pro RN  Note: Resting quietly in bed. Bed in low position, wheels locked. Side rails up x2. Call light in reach and bed alarm remains activated.     Goal: Absence of physical injury  Description: Absence of physical injury  Outcome: Ongoing     Problem: Tissue Perfusion - Renal, Altered:  Goal: Electrolytes within specified parameters  Description: Electrolytes within specified parameters  Outcome: Ongoing  Goal: Urine creatinine clearance will be within specified parameters  Description: Urine creatinine clearance will be within specified parameters  Outcome: Ongoing  Goal: Serum creatinine will be within specified parameters  Description: Serum creatinine will be within specified parameters  Outcome: Ongoing  Goal: Ability to achieve a balanced intake and output will improve  Description: Ability to achieve a balanced intake and output will improve  Outcome: Ongoing     Problem: Skin Integrity:  Goal: Will show no infection signs and symptoms  Description: Will show no infection signs and symptoms  Outcome: Ongoing  Goal: Absence of new skin breakdown  Description: Absence of new skin breakdown  Outcome: Ongoing

## 2020-08-03 NOTE — PROGRESS NOTES
Ramin Ellis NP   Patient: Deidre Shah   0'\"   8/3/20 7:07 PM   [phone] Hospital or Facility: [facility] From: [facility], [facility] RE: Live Mahad 1960 RM: 313 pt has had a migraine headache since hd today. Tylenol given, did not help much. pt requesting something other than Tylenol for this ha. pt states he takes advil at home for this. pt is a renal patient. please review and order something to help relieve his pain. ty Need Callback: NO CALLBACK REQ C4 PROGRESSIVE CARE  Unread   See orders reported off.  Hs rn to follow

## 2020-08-04 ENCOUNTER — APPOINTMENT (OUTPATIENT)
Dept: CT IMAGING | Age: 60
End: 2020-08-04
Payer: COMMERCIAL

## 2020-08-04 PROCEDURE — 2580000003 HC RX 258: Performed by: HOSPITALIST

## 2020-08-04 PROCEDURE — 96372 THER/PROPH/DIAG INJ SC/IM: CPT

## 2020-08-04 PROCEDURE — 97116 GAIT TRAINING THERAPY: CPT

## 2020-08-04 PROCEDURE — 94761 N-INVAS EAR/PLS OXIMETRY MLT: CPT

## 2020-08-04 PROCEDURE — 2700000000 HC OXYGEN THERAPY PER DAY

## 2020-08-04 PROCEDURE — 2060000000 HC ICU INTERMEDIATE R&B

## 2020-08-04 PROCEDURE — 6370000000 HC RX 637 (ALT 250 FOR IP): Performed by: NURSE PRACTITIONER

## 2020-08-04 PROCEDURE — 6360000002 HC RX W HCPCS: Performed by: HOSPITALIST

## 2020-08-04 PROCEDURE — 6370000000 HC RX 637 (ALT 250 FOR IP): Performed by: INTERNAL MEDICINE

## 2020-08-04 PROCEDURE — 97110 THERAPEUTIC EXERCISES: CPT

## 2020-08-04 PROCEDURE — 70450 CT HEAD/BRAIN W/O DYE: CPT

## 2020-08-04 PROCEDURE — G0378 HOSPITAL OBSERVATION PER HR: HCPCS

## 2020-08-04 PROCEDURE — 6370000000 HC RX 637 (ALT 250 FOR IP): Performed by: HOSPITALIST

## 2020-08-04 PROCEDURE — 97530 THERAPEUTIC ACTIVITIES: CPT

## 2020-08-04 RX ADMIN — HEPARIN SODIUM 5000 UNITS: 5000 INJECTION INTRAVENOUS; SUBCUTANEOUS at 14:00

## 2020-08-04 RX ADMIN — DOCUSATE SODIUM 100 MG: 100 CAPSULE, LIQUID FILLED ORAL at 09:49

## 2020-08-04 RX ADMIN — HEPARIN SODIUM 5000 UNITS: 5000 INJECTION INTRAVENOUS; SUBCUTANEOUS at 10:00

## 2020-08-04 RX ADMIN — BUTALBITAL, ACETAMINOPHEN, AND CAFFEINE 1 TABLET: 50; 325; 40 TABLET ORAL at 10:00

## 2020-08-04 RX ADMIN — MIDODRINE HYDROCHLORIDE 10 MG: 5 TABLET ORAL at 14:00

## 2020-08-04 RX ADMIN — DOCUSATE SODIUM 100 MG: 100 CAPSULE, LIQUID FILLED ORAL at 20:29

## 2020-08-04 RX ADMIN — SEVELAMER CARBONATE 3200 MG: 800 TABLET, FILM COATED ORAL at 09:49

## 2020-08-04 RX ADMIN — TRAZODONE HYDROCHLORIDE 150 MG: 50 TABLET ORAL at 20:29

## 2020-08-04 RX ADMIN — MIDODRINE HYDROCHLORIDE 10 MG: 5 TABLET ORAL at 17:59

## 2020-08-04 RX ADMIN — SEVELAMER CARBONATE 3200 MG: 800 TABLET, FILM COATED ORAL at 14:00

## 2020-08-04 RX ADMIN — FLUTICASONE PROPIONATE 1 SPRAY: 50 SPRAY, METERED NASAL at 10:00

## 2020-08-04 RX ADMIN — ASPIRIN 162 MG: 81 TABLET, COATED ORAL at 09:49

## 2020-08-04 RX ADMIN — MIDODRINE HYDROCHLORIDE 10 MG: 5 TABLET ORAL at 09:49

## 2020-08-04 RX ADMIN — NEPHROCAP 1 MG: 1 CAP ORAL at 09:49

## 2020-08-04 RX ADMIN — HEPARIN SODIUM 5000 UNITS: 5000 INJECTION INTRAVENOUS; SUBCUTANEOUS at 20:30

## 2020-08-04 RX ADMIN — SEVELAMER CARBONATE 3200 MG: 800 TABLET, FILM COATED ORAL at 20:29

## 2020-08-04 RX ADMIN — Medication 10 ML: at 09:50

## 2020-08-04 RX ADMIN — CITALOPRAM HYDROBROMIDE 40 MG: 20 TABLET ORAL at 09:49

## 2020-08-04 RX ADMIN — ATORVASTATIN CALCIUM 40 MG: 40 TABLET, FILM COATED ORAL at 10:00

## 2020-08-04 RX ADMIN — Medication 10 ML: at 20:30

## 2020-08-04 ASSESSMENT — PAIN SCALES - GENERAL
PAINLEVEL_OUTOF10: 7
PAINLEVEL_OUTOF10: 0

## 2020-08-04 ASSESSMENT — PAIN DESCRIPTION - LOCATION: LOCATION: HEAD

## 2020-08-04 ASSESSMENT — PAIN DESCRIPTION - PAIN TYPE: TYPE: ACUTE PAIN

## 2020-08-04 ASSESSMENT — PAIN DESCRIPTION - ORIENTATION: ORIENTATION: ANTERIOR

## 2020-08-04 ASSESSMENT — PAIN - FUNCTIONAL ASSESSMENT: PAIN_FUNCTIONAL_ASSESSMENT: ACTIVITIES ARE NOT PREVENTED

## 2020-08-04 NOTE — PROGRESS NOTES
Bedside report received from [facility]. Patient resting comfortably in bed. No signs of discomfort or distress. Bed is in lowest position, wheels locked, 2/2 side rails up. Bedside table and call light within reach. White board updated. Will continue to monitor patient. Kaela Gonsales RN    7:40 PM  VSS. No needs at this time. Kaela Gonsales RN    8:35 PM  Shift assessment complete. (See findings in flowsheet). Med pass complete. (See MAR). Patient with no complaints at this time. Patient resting in bed comfortably. No signs or symptoms of distress or discomfort noted at this time. Bed in lowest position, brakes locked. Nonskid footwear in place. 5 P's addressed, no needs at this time. Pt calls out appropriately. Will continue to monitor. Kaela Gonsales RN    11:29 PM  VSS. No needs at this time.  Kaela Gonsales RN

## 2020-08-04 NOTE — PROGRESS NOTES
Bedside report received from [facility]. Patient resting comfortably in bed. No signs of discomfort or distress. Bed is in lowest position, wheels locked, 2/2 side rails up. Bedside table and call light within reach. White board updated. Will continue to monitor patient. Ally Izaguirre RN    8:11 PM  Shift assessment complete. (See findings in flowsheet). Med pass complete. (See MAR). VSS. Patient called out c/o migraine. PRN fiorocet given for pain. Bed in lowest position, brakes locked. Nonskid footwear in place. 5 P's addressed, no needs at this time. Pt calls out appropriately. Will continue to monitor.  Ally Izaguirre RN

## 2020-08-04 NOTE — PROGRESS NOTES
thoracentesis; bronchoscopy (10/10/2012); Thoracoscopy (Right, 02/18/2013); transesophageal echocardiogram (04/13/2018); Insertable Cardiac Monitor (02/21/2018); and Toe amputation (Right, 4/17/2020). Restrictions  Restrictions/Precautions  Restrictions/Precautions: Fall Risk, General Precautions  Position Activity Restriction  Other position/activity restrictions: pt is blind, fistual to RUE, 2 L O 2  Subjective   General  Chart Reviewed: Yes  Patient assessed for rehabilitation services?: Yes  Additional Pertinent Hx: Vision deficits, HD patient  Family / Caregiver Present: No  Diagnosis: Othostatic BP  Subjective  Subjective: Patient lethargic, but agreeable to OT evaluation  General Comment  Comments: RN cleared pt for OT; pt resting in bed, agreeable to OT  Pain Assessment  Pain Assessment: 0-10  Pain Level: 7  Pain Type: Acute pain  Pain Location: Head  Pain Orientation: Anterior  Functional Pain Assessment: Activities are not prevented  Non-Pharmaceutical Pain Intervention(s): Ambulation/Increased Activity;Repositioned; Therapeutic presence(reports had some pain medicine)  Pre Treatment Pain Screening  Intervention List: Patient able to continue with treatment  Vital Signs  Patient Currently in Pain: Yes   Orientation  Orientation  Overall Orientation Status: Within Functional Limits  Objective    ADL  Grooming: Contact guard assistance(standing at sink to wash hands)        Balance  Sitting Balance: Supervision(side of bed & up in chair)  Standing Balance: Minimal assistance(Hand Held assist, gait belt & can)  Standing Balance  Time: 2 -3 minutes  Activity: bathroom mobility  Functional Mobility  Functional - Mobility Device: Cane  Activity: To/from bathroom  Assist Level: Minimal assistance  Bed mobility  Supine to Sit: Stand by assistance  Sit to Supine: Unable to assess(Left up in chair upon exiting,pt agreeable)  Transfers  Sit to stand: Contact guard assistance  Stand to sit: Contact guard

## 2020-08-04 NOTE — TELEPHONE ENCOUNTER
5/11/2020 NPBB  Plan:   1. Continue amiodarone and ASA   2. Decrease metoprolol to 25mg twice per day due to soft BP  3. TSH and LFT every 6 months while on amiodarone (TSH due now, ordered)  4.  Follow up in 6 months       8/21/2020 NPBB upcoming appt    Cbc, renal 8/3/2020          tsh t4 1/15/2020

## 2020-08-04 NOTE — PROGRESS NOTES
Hospitalist Progress Note      PCP: Barry Valdez    Date of Admission: 7/31/2020    Chief Complaint: Dizziness         Subjective:     S/p HD yesterday. Complains of frontal migraine headaches without any radiation. Usually has migraine headaches at home intermittently and takes aleeve with relief. Medications:  Reviewed    Infusion Medications   Scheduled Medications    b complex-C-folic acid  1 capsule Oral Daily    aspirin EC  162 mg Oral Daily    atorvastatin  40 mg Oral Daily    citalopram  40 mg Oral Daily    docusate sodium  100 mg Oral BID    fluticasone  1 spray Each Nostril Daily    sevelamer  3,200 mg Oral TID    traZODone  150 mg Oral Nightly    midodrine  10 mg Oral TID WC    sodium chloride flush  10 mL Intravenous 2 times per day    heparin (porcine)  5,000 Units Subcutaneous 3 times per day     PRN Meds: butalbital-acetaminophen-caffeine, sodium chloride flush, acetaminophen **OR** acetaminophen, polyethylene glycol, promethazine **OR** ondansetron, albuterol, ipratropium-albuterol      Intake/Output Summary (Last 24 hours) at 8/4/2020 1018  Last data filed at 8/4/2020 0510  Gross per 24 hour   Intake 670 ml   Output 1511 ml   Net -841 ml       Physical Exam Performed:    /69   Pulse 63   Temp 97.5 °F (36.4 °C) (Oral)   Resp 18   Ht 5' 9\" (1.753 m)   Wt 245 lb 14.4 oz (111.5 kg)   SpO2 97%   BMI 36.31 kg/m²     General appearance: No apparent distress, appears stated age and cooperative. HEENT:  Conjunctivae/corneas clear. Chronic bilat blindness  Neck: Supple, with full range of motion. No jugular venous distention. Trachea midline. Respiratory:  Normal respiratory effort. Clear to auscultation, bilaterally without Rales/Wheezes/Rhonchi. Cardiovascular: Regular rate and rhythm with normal S1/S2 without murmurs, rubs or gallops. Abdomen: Soft, non-tender, non-distended with normal bowel sounds.   Musculoskeletal: No clubbing, cyanosis Skin: warm and dry.  Neurologic:   Alert, speech clear with no overt facial droop. Chronic blindness at baseline   Psychiatric: Alert and oriented, thought content appropriate, normal insight  al bilaterally       Labs:   Recent Labs     08/03/20  0731   WBC 7.4   HGB 12.6*   HCT 37.9*        Recent Labs     08/02/20  0445 08/03/20  0731   * 133*   K 4.4 5.1   CL 96* 95*   CO2 22 21   BUN 62* 83*   CREATININE 8.4* 9.9*   CALCIUM 9.4 9.1   PHOS 4.7 4.9     No results for input(s): AST, ALT, BILIDIR, BILITOT, ALKPHOS in the last 72 hours. No results for input(s): INR in the last 72 hours. No results for input(s): Narda Grumet in the last 72 hours. Urinalysis:      Lab Results   Component Value Date    NITRU Negative 03/30/2015    WBCUA >100 03/30/2015    BACTERIA 4+ 09/16/2010    RBCUA >100 03/30/2015    BLOODU LARGE 03/30/2015    SPECGRAV 1.020 03/30/2015    GLUCOSEU 100 03/30/2015    GLUCOSEU 100 09/16/2010       Radiology:  XR CHEST PORTABLE   Final Result   Bilateral lower lung airspace disease, greater on the right, with bilateral   pleural effusions. Pulmonary edema would be the primary consideration,   although pneumonia or scarring would be considered as well, especially the   right. The distribution of the airspace disease is very similar when   compared to the 03/18/2020 examination. Assessment/Plan:    Active Hospital Problems    Diagnosis    Hypotension [I95.9]     Priority: High    ESRD on hemodialysis ([facility] [N18.6, Z99.2]     Priority: Medium    Chronic diastolic (congestive) heart failure (HCC) [I50.32]     Priority: Low    DM2 (diabetes mellitus, type 2) (HCC) [E11.9]    Paroxysmal atrial fibrillation ([facility] [I48.0]     Hypotension associated with hemodialysis: improved with IVF. Midodrine dose increased to TID. BP improved. resolved       ESRD : on HD M-W-F. Nephrology assisting with inpatient dialysis needs     Paroxysmal atrial fibrillation: sinus rhythm.
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
## 2020-08-05 VITALS
TEMPERATURE: 98.3 F | DIASTOLIC BLOOD PRESSURE: 72 MMHG | SYSTOLIC BLOOD PRESSURE: 113 MMHG | HEIGHT: 69 IN | BODY MASS INDEX: 35.92 KG/M2 | RESPIRATION RATE: 17 BRPM | HEART RATE: 65 BPM | OXYGEN SATURATION: 98 % | WEIGHT: 242.51 LBS

## 2020-08-05 PROBLEM — G43.909 MIGRAINE HEADACHE: Status: ACTIVE | Noted: 2020-08-05

## 2020-08-05 LAB
ALBUMIN SERPL-MCNC: 3.4 G/DL (ref 3.4–5)
ANION GAP SERPL CALCULATED.3IONS-SCNC: 14 MMOL/L (ref 3–16)
BUN BLDV-MCNC: 59 MG/DL (ref 7–20)
CALCIUM SERPL-MCNC: 8.8 MG/DL (ref 8.3–10.6)
CHLORIDE BLD-SCNC: 93 MMOL/L (ref 99–110)
CO2: 24 MMOL/L (ref 21–32)
CREAT SERPL-MCNC: 9.2 MG/DL (ref 0.8–1.3)
GFR AFRICAN AMERICAN: 7
GFR NON-AFRICAN AMERICAN: 6
GLUCOSE BLD-MCNC: 95 MG/DL (ref 70–99)
HBV SURFACE AB TITR SER: <3.5 MIU/ML
HCT VFR BLD CALC: 36.4 % (ref 40.5–52.5)
HEMOGLOBIN: 11.8 G/DL (ref 13.5–17.5)
HEPATITIS B SURFACE ANTIGEN INTERPRETATION: NORMAL
MCH RBC QN AUTO: 30.2 PG (ref 26–34)
MCHC RBC AUTO-ENTMCNC: 32.4 G/DL (ref 31–36)
MCV RBC AUTO: 93.2 FL (ref 80–100)
PDW BLD-RTO: 15.1 % (ref 12.4–15.4)
PHOSPHORUS: 5.8 MG/DL (ref 2.5–4.9)
PLATELET # BLD: 237 K/UL (ref 135–450)
PMV BLD AUTO: 7.2 FL (ref 5–10.5)
POTASSIUM SERPL-SCNC: 4.8 MMOL/L (ref 3.5–5.1)
RBC # BLD: 3.91 M/UL (ref 4.2–5.9)
SODIUM BLD-SCNC: 131 MMOL/L (ref 136–145)
T4 FREE: 1.1 NG/DL (ref 0.9–1.8)
TSH REFLEX FT4: 6.98 UIU/ML (ref 0.27–4.2)
WBC # BLD: 6.5 K/UL (ref 4–11)

## 2020-08-05 PROCEDURE — 2700000000 HC OXYGEN THERAPY PER DAY

## 2020-08-05 PROCEDURE — 90935 HEMODIALYSIS ONE EVALUATION: CPT

## 2020-08-05 PROCEDURE — 36415 COLL VENOUS BLD VENIPUNCTURE: CPT

## 2020-08-05 PROCEDURE — 80069 RENAL FUNCTION PANEL: CPT

## 2020-08-05 PROCEDURE — 6370000000 HC RX 637 (ALT 250 FOR IP): Performed by: INTERNAL MEDICINE

## 2020-08-05 PROCEDURE — 84443 ASSAY THYROID STIM HORMONE: CPT

## 2020-08-05 PROCEDURE — 87340 HEPATITIS B SURFACE AG IA: CPT

## 2020-08-05 PROCEDURE — 85027 COMPLETE CBC AUTOMATED: CPT

## 2020-08-05 PROCEDURE — 86706 HEP B SURFACE ANTIBODY: CPT

## 2020-08-05 PROCEDURE — 99219 PR INITIAL OBSERVATION CARE/DAY 50 MINUTES: CPT | Performed by: PSYCHIATRY & NEUROLOGY

## 2020-08-05 PROCEDURE — 97530 THERAPEUTIC ACTIVITIES: CPT

## 2020-08-05 PROCEDURE — 97535 SELF CARE MNGMENT TRAINING: CPT

## 2020-08-05 PROCEDURE — G0378 HOSPITAL OBSERVATION PER HR: HCPCS

## 2020-08-05 PROCEDURE — 6360000002 HC RX W HCPCS: Performed by: HOSPITALIST

## 2020-08-05 PROCEDURE — 96372 THER/PROPH/DIAG INJ SC/IM: CPT

## 2020-08-05 PROCEDURE — 6370000000 HC RX 637 (ALT 250 FOR IP): Performed by: HOSPITALIST

## 2020-08-05 PROCEDURE — 2580000003 HC RX 258: Performed by: HOSPITALIST

## 2020-08-05 PROCEDURE — 84439 ASSAY OF FREE THYROXINE: CPT

## 2020-08-05 PROCEDURE — 94761 N-INVAS EAR/PLS OXIMETRY MLT: CPT

## 2020-08-05 RX ORDER — SODIUM CHLORIDE 9 MG/ML
INJECTION, SOLUTION INTRAVENOUS
Status: DISCONTINUED
Start: 2020-08-05 | End: 2020-08-05 | Stop reason: HOSPADM

## 2020-08-05 RX ORDER — BUTALBITAL, ACETAMINOPHEN AND CAFFEINE 50; 325; 40 MG/1; MG/1; MG/1
1 TABLET ORAL EVERY 4 HOURS PRN
Qty: 30 TABLET | Refills: 0 | Status: SHIPPED | OUTPATIENT
Start: 2020-08-05

## 2020-08-05 RX ORDER — AMIODARONE HYDROCHLORIDE 100 MG/1
TABLET ORAL
Qty: 30 TABLET | Refills: 2 | Status: SHIPPED | OUTPATIENT
Start: 2020-08-05 | End: 2020-09-28

## 2020-08-05 RX ADMIN — HEPARIN SODIUM 5000 UNITS: 5000 INJECTION INTRAVENOUS; SUBCUTANEOUS at 14:44

## 2020-08-05 RX ADMIN — DOCUSATE SODIUM 100 MG: 100 CAPSULE, LIQUID FILLED ORAL at 14:44

## 2020-08-05 RX ADMIN — ASPIRIN 162 MG: 81 TABLET, COATED ORAL at 14:44

## 2020-08-05 RX ADMIN — FLUTICASONE PROPIONATE 1 SPRAY: 50 SPRAY, METERED NASAL at 14:45

## 2020-08-05 RX ADMIN — Medication 10 ML: at 14:45

## 2020-08-05 RX ADMIN — MIDODRINE HYDROCHLORIDE 10 MG: 5 TABLET ORAL at 10:55

## 2020-08-05 RX ADMIN — SEVELAMER CARBONATE 3200 MG: 800 TABLET, FILM COATED ORAL at 14:43

## 2020-08-05 RX ADMIN — NEPHROCAP 1 MG: 1 CAP ORAL at 14:44

## 2020-08-05 RX ADMIN — CITALOPRAM HYDROBROMIDE 40 MG: 20 TABLET ORAL at 14:43

## 2020-08-05 RX ADMIN — ATORVASTATIN CALCIUM 40 MG: 40 TABLET, FILM COATED ORAL at 14:44

## 2020-08-05 ASSESSMENT — PAIN SCALES - GENERAL
PAINLEVEL_OUTOF10: 0
PAINLEVEL_OUTOF10: 0

## 2020-08-05 NOTE — PROGRESS NOTES
Pt discharged home per order. IV removed, scripts sent to [facility], meds reviewed. Pt fully understand d/c instructions.
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
## 2020-08-05 NOTE — DISCHARGE SUMMARY
Hospital Medicine Discharge Summary    Patient ID: Jong Lake      Patient's PCP: Kelly Walker Date: 7/31/2020     Discharge Date: 8/5/2020      Admitting Physician: Barbara Valenzuela MD     Discharge Physician: Barbara Valenzuela MD     Discharge Diagnoses: Active Hospital Problems    Diagnosis    Hypotension [I95.9]     Priority: High    ESRD on hemodialysis ([facility] [N18.6, Z99.2]     Priority: Medium    Chronic diastolic (congestive) heart failure (HCC) [I50.32]     Priority: Low    Migraine headache [G43.909]    Dizziness [R42]    ESRD (end stage renal disease) on dialysis ([facility] [N18.6, Z99.2]    DM2 (diabetes mellitus, type 2) ([facility] [E11.9]    Paroxysmal atrial fibrillation ([facility] [I48.0]       The patient was seen and examined on day of discharge and this discharge summary is in conjunction with any daily progress note from day of discharge. HPI:   56 y.o. male with a significant history of blindness; paroxysmal A. fib; diabetes; and end-stage renal disease on dialysis (Monday Wednesdays and Fridays) who presented to [facility] with dizziness. He describes his dizziness as lightheadedness. Associated with symptoms is shortness of breath. Patient nearly fell but caught himself. His symptoms started after dialysis. After dialysis his systolic blood pressure was noted to be low. Patient took midodrine  x2 doses before coming to the emergency department. Hospital Course:       Hypotension associated with hemodialysis: improved with IVF. Midodrine dose increased to TID. BP improved. Resolved        ESRD : on HD M-W-F. Nephrology assisting with inpatient dialysis needs      Paroxysmal atrial fibrillation: sinus rhythm. Has watchman device. Previously anticoagulated with warfarin which was discontinued due to bleeding per report.    No rate controlling medication required.  Continue on aspirin 162 mg daily.         Migraine Headache : has hx of this.   CT head was negative. Later improved with fiorecet which was continued. Avoid NSAIDs  due to ESRD.         Chronic respiratory failure with hypoxia on supplemental oxygen at 2-4 LPM.    At baseline     Type 2 Diabetes (HgbA1c 5.5%):   diet controlled.      Chronic diastolic heart failure : stable.  No ACE inhibitor due to renal failure.          Dyslipidemia: Continue statin      Depression and insomnia: Mood stable. Continue home meds     Physical Exam Performed:     /72   Pulse 65   Temp 98.3 °F (36.8 °C)   Resp 17   Ht 5' 9\" (1.753 m)   Wt 242 lb 8.1 oz (110 kg)   SpO2 98%   BMI 35.81 kg/m²     General appearance: No apparent distress, appears stated age and cooperative. HEENT:  Conjunctivae/corneas clear. Chronic bilat blindness  Neck: Supple, with full range of motion. No jugular venous distention. Trachea midline. Respiratory:  Normal respiratory effort. Clear to auscultation, bilaterally without Rales/Wheezes/Rhonchi. Cardiovascular: Regular rate and rhythm with normal S1/S2 without murmurs, rubs or gallops. Abdomen: Soft, non-tender, non-distended with normal bowel sounds. Musculoskeletal: No clubbing, cyanosis Skin: warm and dry. Neurologic:   Alert, speech clear with no overt facial droop. Chronic blindness at baseline   Psychiatric: Alert and oriented, thought content appropriate, normal insight  al bilaterally               Labs:  For convenience and continuity at follow-up the following most recent labs are provided:      CBC:    Lab Results   Component Value Date    WBC 6.5 08/05/2020    HGB 11.8 08/05/2020    HCT 36.4 08/05/2020     08/05/2020       Renal:    Lab Results   Component Value Date     08/05/2020    K 4.8 08/05/2020    K 4.3 08/01/2020    CL 93 08/05/2020    CO2 24 08/05/2020    BUN 59 08/05/2020    CREATININE 9.2 08/05/2020    CALCIUM 8.8 08/05/2020    PHOS 5.8 08/05/2020         Significant Diagnostic Studies    Radiology:   CT HEAD WO CONTRAST   Final Result 2/2/2020, For 1 dose, Disp-1 kit, R-0, Print      albuterol (PROVENTIL) (2.5 MG/3ML) 0.083% nebulizer solution Take 3 mLs by nebulization every 4 hours as needed for Wheezing or Shortness of Breath, Disp-25 each, R-0Print      aspirin EC 81 MG EC tablet Take 2 tablets by mouth daily, Disp-90 tablet, R-3Normal      atorvastatin (LIPITOR) 40 MG tablet Take 1 tablet by mouth daily, Disp-30 tablet, R-1Print      ipratropium-albuterol (DUONEB) 0.5-2.5 (3) MG/3ML SOLN nebulizer solution Inhale 3 mLs into the lungs every 6 hours as needed for Shortness of Breath (dyspnea or wheezes. ) DX PULM HTN I27.20, Disp-360 mL, R-6Normal      B Complex-C-Folic Acid (TRIPHROCAPS PO) Take by mouthHistorical Med      docusate sodium (COLACE, DULCOLAX) 100 MG CAPS Take 100 mg by mouth 2 times dailyOTC      citalopram (CELEXA) 40 MG tablet Take 1 tablet by mouth daily, Disp-30 tablet, R-0NO PRINT      OXYGEN Inhale 2 L into the lungs continuous With concentrator and portability, Disp-1 each, R-0      traZODone (DESYREL) 150 MG tablet Take 150 mg by mouth nightly. sevelamer (RENVELA) 800 MG tablet Take 4 tablets by mouth 3 times daily Historical Med             Time Spent on discharge is more than 30 minutes in the examination, evaluation, counseling and review of medications and discharge plan. Signed:    Julee Mendoza MD   8/5/2020      Thank you Dee Hoskins for the opportunity to be involved in this patient's care. If you have any questions or concerns please feel free to contact me at [phone].

## 2020-09-28 RX ORDER — AMIODARONE HYDROCHLORIDE 100 MG/1
TABLET ORAL
Qty: 30 TABLET | Refills: 2 | Status: SHIPPED | OUTPATIENT
Start: 2020-09-28 | End: 2020-11-02

## 2020-09-28 NOTE — TELEPHONE ENCOUNTER
Pt requesting amiodarone 100 mg tab. Pending script sent to [facility]. OV 5/11/2020  Plan:   1. Continue amiodarone and ASA   2. Decrease metoprolol to 25mg twice per day due to soft BP  3. TSH and LFT every 6 months while on amiodarone (TSH due now, ordered)  4.  Follow up in 6 months

## 2020-10-05 ENCOUNTER — TELEPHONE (OUTPATIENT)
Dept: CARDIOLOGY CLINIC | Age: 60
End: 2020-10-05

## 2020-10-05 NOTE — TELEPHONE ENCOUNTER
Received warning from pts insurance that pt is on Amiodarone and Citalopram with puts his at risk for OTc prolongation. Okay to continue?

## 2020-10-26 ENCOUNTER — HOSPITAL ENCOUNTER (OUTPATIENT)
Age: 60
Setting detail: OBSERVATION
Discharge: HOME | End: 2020-10-28
Attending: EMERGENCY MEDICINE | Admitting: FAMILY MEDICINE
Payer: COMMERCIAL

## 2020-10-26 ENCOUNTER — APPOINTMENT (OUTPATIENT)
Dept: GENERAL RADIOLOGY | Age: 60
End: 2020-10-26
Payer: COMMERCIAL

## 2020-10-26 PROBLEM — R07.9 CHEST PAIN: Status: ACTIVE | Noted: 2020-10-26

## 2020-10-26 LAB
A/G RATIO: 1.2 (ref 1.1–2.2)
ALBUMIN SERPL-MCNC: 4.1 G/DL (ref 3.4–5)
ALP BLD-CCNC: 124 U/L (ref 40–129)
ALT SERPL-CCNC: 11 U/L (ref 10–40)
ANION GAP SERPL CALCULATED.3IONS-SCNC: 8 MMOL/L (ref 3–16)
AST SERPL-CCNC: 22 U/L (ref 15–37)
BASOPHILS ABSOLUTE: 0.1 K/UL (ref 0–0.2)
BASOPHILS RELATIVE PERCENT: 1.3 %
BILIRUB SERPL-MCNC: <0.2 MG/DL (ref 0–1)
BUN BLDV-MCNC: 18 MG/DL (ref 7–20)
CALCIUM SERPL-MCNC: 9.9 MG/DL (ref 8.3–10.6)
CHLORIDE BLD-SCNC: 96 MMOL/L (ref 99–110)
CO2: 29 MMOL/L (ref 21–32)
CREAT SERPL-MCNC: 5.2 MG/DL (ref 0.8–1.3)
EKG ATRIAL RATE: 75 BPM
EKG DIAGNOSIS: NORMAL
EKG P AXIS: 38 DEGREES
EKG P-R INTERVAL: 146 MS
EKG Q-T INTERVAL: 426 MS
EKG QRS DURATION: 88 MS
EKG QTC CALCULATION (BAZETT): 475 MS
EKG R AXIS: 25 DEGREES
EKG T AXIS: 32 DEGREES
EKG VENTRICULAR RATE: 75 BPM
EOSINOPHILS ABSOLUTE: 0.6 K/UL (ref 0–0.6)
EOSINOPHILS RELATIVE PERCENT: 7.1 %
GFR AFRICAN AMERICAN: 14
GFR NON-AFRICAN AMERICAN: 11
GLOBULIN: 3.3 G/DL
GLUCOSE BLD-MCNC: 145 MG/DL (ref 70–99)
HCT VFR BLD CALC: 39.1 % (ref 40.5–52.5)
HEMOGLOBIN: 12.8 G/DL (ref 13.5–17.5)
LYMPHOCYTES ABSOLUTE: 0.8 K/UL (ref 1–5.1)
LYMPHOCYTES RELATIVE PERCENT: 9.5 %
MCH RBC QN AUTO: 30.3 PG (ref 26–34)
MCHC RBC AUTO-ENTMCNC: 32.6 G/DL (ref 31–36)
MCV RBC AUTO: 92.9 FL (ref 80–100)
MONOCYTES ABSOLUTE: 0.7 K/UL (ref 0–1.3)
MONOCYTES RELATIVE PERCENT: 7.6 %
NEUTROPHILS ABSOLUTE: 6.6 K/UL (ref 1.7–7.7)
NEUTROPHILS RELATIVE PERCENT: 74.5 %
PDW BLD-RTO: 15.8 % (ref 12.4–15.4)
PLATELET # BLD: 286 K/UL (ref 135–450)
PMV BLD AUTO: 6.9 FL (ref 5–10.5)
POTASSIUM SERPL-SCNC: 4.5 MMOL/L (ref 3.5–5.1)
RBC # BLD: 4.21 M/UL (ref 4.2–5.9)
SODIUM BLD-SCNC: 133 MMOL/L (ref 136–145)
TOTAL PROTEIN: 7.4 G/DL (ref 6.4–8.2)
TROPONIN: 0.04 NG/ML
WBC # BLD: 8.8 K/UL (ref 4–11)

## 2020-10-26 PROCEDURE — 80053 COMPREHEN METABOLIC PANEL: CPT

## 2020-10-26 PROCEDURE — 6370000000 HC RX 637 (ALT 250 FOR IP): Performed by: EMERGENCY MEDICINE

## 2020-10-26 PROCEDURE — 6370000000 HC RX 637 (ALT 250 FOR IP): Performed by: NURSE PRACTITIONER

## 2020-10-26 PROCEDURE — 99285 EMERGENCY DEPT VISIT HI MDM: CPT

## 2020-10-26 PROCEDURE — 85025 COMPLETE CBC W/AUTO DIFF WBC: CPT

## 2020-10-26 PROCEDURE — G0378 HOSPITAL OBSERVATION PER HR: HCPCS

## 2020-10-26 PROCEDURE — 93010 ELECTROCARDIOGRAM REPORT: CPT | Performed by: INTERNAL MEDICINE

## 2020-10-26 PROCEDURE — 84484 ASSAY OF TROPONIN QUANT: CPT

## 2020-10-26 PROCEDURE — 94761 N-INVAS EAR/PLS OXIMETRY MLT: CPT

## 2020-10-26 PROCEDURE — 93005 ELECTROCARDIOGRAM TRACING: CPT | Performed by: EMERGENCY MEDICINE

## 2020-10-26 PROCEDURE — 2700000000 HC OXYGEN THERAPY PER DAY

## 2020-10-26 PROCEDURE — 71045 X-RAY EXAM CHEST 1 VIEW: CPT

## 2020-10-26 RX ORDER — ASPIRIN 81 MG/1
243 TABLET, CHEWABLE ORAL ONCE
Status: COMPLETED | OUTPATIENT
Start: 2020-10-26 | End: 2020-10-26

## 2020-10-26 RX ORDER — SODIUM CHLORIDE 9 MG/ML
INJECTION, SOLUTION INTRAVENOUS ONCE
Status: CANCELLED | OUTPATIENT
Start: 2020-10-26

## 2020-10-26 RX ORDER — MIDODRINE HYDROCHLORIDE 5 MG/1
5 TABLET ORAL ONCE
Status: COMPLETED | OUTPATIENT
Start: 2020-10-26 | End: 2020-10-26

## 2020-10-26 RX ADMIN — ASPIRIN 243 MG: 81 TABLET, CHEWABLE ORAL at 21:17

## 2020-10-26 RX ADMIN — MIDODRINE HYDROCHLORIDE 5 MG: 5 TABLET ORAL at 19:39

## 2020-10-26 ASSESSMENT — ENCOUNTER SYMPTOMS
NAUSEA: 0
WHEEZING: 0
COLOR CHANGE: 0
BACK PAIN: 0
VOMITING: 0
COUGH: 0
ABDOMINAL PAIN: 0
DIARRHEA: 0

## 2020-10-26 NOTE — ED NOTES
Bed: 19  Expected date:   Expected time:   Means of arrival:   Comments:  Sascha Shell RN  10/26/20 1800

## 2020-10-26 NOTE — ED NOTES
Patient identified as a positive fall risk on the ED triage fall screening. Patient placed in fall precautions which includes:  yellow fall risk bracelet on wrist, yellow socks on bed, \"Be Safe\" sign placed on patient's door, and bed alarm placed under patient/alarm turned on. Patient instructed on importance of not getting out of bed or ambulating without assistance for safety.           Jaleel Welsh RN  10/26/20 0138
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
## 2020-10-27 ENCOUNTER — APPOINTMENT (OUTPATIENT)
Dept: NUCLEAR MEDICINE | Age: 60
End: 2020-10-27
Payer: COMMERCIAL

## 2020-10-27 LAB
A/G RATIO: 1.1 (ref 1.1–2.2)
ALBUMIN SERPL-MCNC: 3.6 G/DL (ref 3.4–5)
ALP BLD-CCNC: 113 U/L (ref 40–129)
ALT SERPL-CCNC: 9 U/L (ref 10–40)
ANION GAP SERPL CALCULATED.3IONS-SCNC: 10 MMOL/L (ref 3–16)
AST SERPL-CCNC: 12 U/L (ref 15–37)
BILIRUB SERPL-MCNC: <0.2 MG/DL (ref 0–1)
BUN BLDV-MCNC: 26 MG/DL (ref 7–20)
CALCIUM SERPL-MCNC: 9.7 MG/DL (ref 8.3–10.6)
CHLORIDE BLD-SCNC: 99 MMOL/L (ref 99–110)
CHOLESTEROL, TOTAL: 109 MG/DL (ref 0–199)
CO2: 26 MMOL/L (ref 21–32)
CREAT SERPL-MCNC: 6.2 MG/DL (ref 0.8–1.3)
EKG ATRIAL RATE: 67 BPM
EKG DIAGNOSIS: NORMAL
EKG P AXIS: 28 DEGREES
EKG P-R INTERVAL: 154 MS
EKG Q-T INTERVAL: 446 MS
EKG QRS DURATION: 80 MS
EKG QTC CALCULATION (BAZETT): 471 MS
EKG R AXIS: 21 DEGREES
EKG T AXIS: 25 DEGREES
EKG VENTRICULAR RATE: 67 BPM
ESTIMATED AVERAGE GLUCOSE: 119.8 MG/DL
GFR AFRICAN AMERICAN: 11
GFR NON-AFRICAN AMERICAN: 9
GLOBULIN: 3.2 G/DL
GLUCOSE BLD-MCNC: 103 MG/DL (ref 70–99)
HBA1C MFR BLD: 5.8 %
HCT VFR BLD CALC: 37.7 % (ref 40.5–52.5)
HDLC SERPL-MCNC: 42 MG/DL (ref 40–60)
HEMOGLOBIN: 12.3 G/DL (ref 13.5–17.5)
LDL CHOLESTEROL CALCULATED: 50 MG/DL
LV EF: 73 %
LVEF MODALITY: NORMAL
MCH RBC QN AUTO: 30.8 PG (ref 26–34)
MCHC RBC AUTO-ENTMCNC: 32.7 G/DL (ref 31–36)
MCV RBC AUTO: 94 FL (ref 80–100)
PDW BLD-RTO: 16 % (ref 12.4–15.4)
PLATELET # BLD: 243 K/UL (ref 135–450)
PMV BLD AUTO: 6.7 FL (ref 5–10.5)
POTASSIUM REFLEX MAGNESIUM: 4.6 MMOL/L (ref 3.5–5.1)
PRO-BNP: 4064 PG/ML (ref 0–124)
RBC # BLD: 4.01 M/UL (ref 4.2–5.9)
SODIUM BLD-SCNC: 135 MMOL/L (ref 136–145)
TOTAL PROTEIN: 6.8 G/DL (ref 6.4–8.2)
TRIGL SERPL-MCNC: 83 MG/DL (ref 0–150)
TROPONIN: 0.03 NG/ML
VLDLC SERPL CALC-MCNC: 17 MG/DL
WBC # BLD: 7.4 K/UL (ref 4–11)

## 2020-10-27 PROCEDURE — A9502 TC99M TETROFOSMIN: HCPCS | Performed by: STUDENT IN AN ORGANIZED HEALTH CARE EDUCATION/TRAINING PROGRAM

## 2020-10-27 PROCEDURE — 99214 OFFICE O/P EST MOD 30 MIN: CPT | Performed by: INTERNAL MEDICINE

## 2020-10-27 PROCEDURE — 78452 HT MUSCLE IMAGE SPECT MULT: CPT

## 2020-10-27 PROCEDURE — 96372 THER/PROPH/DIAG INJ SC/IM: CPT

## 2020-10-27 PROCEDURE — 2580000003 HC RX 258: Performed by: STUDENT IN AN ORGANIZED HEALTH CARE EDUCATION/TRAINING PROGRAM

## 2020-10-27 PROCEDURE — 6360000002 HC RX W HCPCS: Performed by: STUDENT IN AN ORGANIZED HEALTH CARE EDUCATION/TRAINING PROGRAM

## 2020-10-27 PROCEDURE — 6370000000 HC RX 637 (ALT 250 FOR IP): Performed by: STUDENT IN AN ORGANIZED HEALTH CARE EDUCATION/TRAINING PROGRAM

## 2020-10-27 PROCEDURE — 3430000000 HC RX DIAGNOSTIC RADIOPHARMACEUTICAL: Performed by: STUDENT IN AN ORGANIZED HEALTH CARE EDUCATION/TRAINING PROGRAM

## 2020-10-27 PROCEDURE — 36415 COLL VENOUS BLD VENIPUNCTURE: CPT

## 2020-10-27 PROCEDURE — 83036 HEMOGLOBIN GLYCOSYLATED A1C: CPT

## 2020-10-27 PROCEDURE — G0378 HOSPITAL OBSERVATION PER HR: HCPCS

## 2020-10-27 PROCEDURE — 96374 THER/PROPH/DIAG INJ IV PUSH: CPT

## 2020-10-27 PROCEDURE — 80053 COMPREHEN METABOLIC PANEL: CPT

## 2020-10-27 PROCEDURE — 94761 N-INVAS EAR/PLS OXIMETRY MLT: CPT

## 2020-10-27 PROCEDURE — 80061 LIPID PANEL: CPT

## 2020-10-27 PROCEDURE — 2700000000 HC OXYGEN THERAPY PER DAY

## 2020-10-27 PROCEDURE — 84484 ASSAY OF TROPONIN QUANT: CPT

## 2020-10-27 PROCEDURE — 93017 CV STRESS TEST TRACING ONLY: CPT

## 2020-10-27 PROCEDURE — 93005 ELECTROCARDIOGRAM TRACING: CPT | Performed by: STUDENT IN AN ORGANIZED HEALTH CARE EDUCATION/TRAINING PROGRAM

## 2020-10-27 PROCEDURE — 85027 COMPLETE CBC AUTOMATED: CPT

## 2020-10-27 PROCEDURE — 93010 ELECTROCARDIOGRAM REPORT: CPT | Performed by: INTERNAL MEDICINE

## 2020-10-27 PROCEDURE — 83880 ASSAY OF NATRIURETIC PEPTIDE: CPT

## 2020-10-27 PROCEDURE — 2500000003 HC RX 250 WO HCPCS: Performed by: FAMILY MEDICINE

## 2020-10-27 RX ORDER — DOBUTAMINE HYDROCHLORIDE 200 MG/100ML
10 INJECTION INTRAVENOUS CONTINUOUS
Status: DISCONTINUED | OUTPATIENT
Start: 2020-10-27 | End: 2020-10-27

## 2020-10-27 RX ORDER — SODIUM CHLORIDE 9 MG/ML
INJECTION, SOLUTION INTRAVENOUS ONCE
Status: COMPLETED | OUTPATIENT
Start: 2020-10-27 | End: 2020-10-27

## 2020-10-27 RX ORDER — ASPIRIN 81 MG/1
81 TABLET, CHEWABLE ORAL DAILY
Status: DISCONTINUED | OUTPATIENT
Start: 2020-10-27 | End: 2020-10-28 | Stop reason: HOSPADM

## 2020-10-27 RX ORDER — ALBUTEROL SULFATE 2.5 MG/3ML
2.5 SOLUTION RESPIRATORY (INHALATION) EVERY 4 HOURS PRN
Status: DISCONTINUED | OUTPATIENT
Start: 2020-10-27 | End: 2020-10-28 | Stop reason: HOSPADM

## 2020-10-27 RX ORDER — SEVELAMER CARBONATE 800 MG/1
3200 TABLET, FILM COATED ORAL 3 TIMES DAILY
Status: DISCONTINUED | OUTPATIENT
Start: 2020-10-27 | End: 2020-10-28 | Stop reason: HOSPADM

## 2020-10-27 RX ORDER — TRAZODONE HYDROCHLORIDE 50 MG/1
150 TABLET ORAL NIGHTLY
Status: DISCONTINUED | OUTPATIENT
Start: 2020-10-27 | End: 2020-10-28 | Stop reason: HOSPADM

## 2020-10-27 RX ORDER — IPRATROPIUM BROMIDE AND ALBUTEROL SULFATE 2.5; .5 MG/3ML; MG/3ML
3 SOLUTION RESPIRATORY (INHALATION) EVERY 6 HOURS PRN
Status: DISCONTINUED | OUTPATIENT
Start: 2020-10-27 | End: 2020-10-28 | Stop reason: HOSPADM

## 2020-10-27 RX ORDER — NITROGLYCERIN 0.4 MG/1
0.4 TABLET SUBLINGUAL EVERY 5 MIN PRN
Status: DISCONTINUED | OUTPATIENT
Start: 2020-10-27 | End: 2020-10-28 | Stop reason: HOSPADM

## 2020-10-27 RX ORDER — CITALOPRAM 20 MG/1
40 TABLET ORAL DAILY
Status: DISCONTINUED | OUTPATIENT
Start: 2020-10-27 | End: 2020-10-28 | Stop reason: HOSPADM

## 2020-10-27 RX ORDER — PROMETHAZINE HYDROCHLORIDE 25 MG/1
12.5 TABLET ORAL EVERY 6 HOURS PRN
Status: DISCONTINUED | OUTPATIENT
Start: 2020-10-27 | End: 2020-10-28 | Stop reason: HOSPADM

## 2020-10-27 RX ORDER — SODIUM CHLORIDE 0.9 % (FLUSH) 0.9 %
10 SYRINGE (ML) INJECTION EVERY 12 HOURS SCHEDULED
Status: DISCONTINUED | OUTPATIENT
Start: 2020-10-27 | End: 2020-10-28 | Stop reason: HOSPADM

## 2020-10-27 RX ORDER — ACETAMINOPHEN 650 MG/1
650 SUPPOSITORY RECTAL EVERY 6 HOURS PRN
Status: DISCONTINUED | OUTPATIENT
Start: 2020-10-27 | End: 2020-10-28 | Stop reason: HOSPADM

## 2020-10-27 RX ORDER — ATORVASTATIN CALCIUM 80 MG/1
80 TABLET, FILM COATED ORAL NIGHTLY
Status: DISCONTINUED | OUTPATIENT
Start: 2020-10-27 | End: 2020-10-28 | Stop reason: HOSPADM

## 2020-10-27 RX ORDER — HEPARIN SODIUM 5000 [USP'U]/ML
5000 INJECTION, SOLUTION INTRAVENOUS; SUBCUTANEOUS EVERY 8 HOURS SCHEDULED
Status: DISCONTINUED | OUTPATIENT
Start: 2020-10-27 | End: 2020-10-28 | Stop reason: HOSPADM

## 2020-10-27 RX ORDER — SODIUM CHLORIDE 0.9 % (FLUSH) 0.9 %
10 SYRINGE (ML) INJECTION PRN
Status: DISCONTINUED | OUTPATIENT
Start: 2020-10-27 | End: 2020-10-28 | Stop reason: HOSPADM

## 2020-10-27 RX ORDER — ACETAMINOPHEN 325 MG/1
650 TABLET ORAL EVERY 6 HOURS PRN
Status: DISCONTINUED | OUTPATIENT
Start: 2020-10-27 | End: 2020-10-28 | Stop reason: HOSPADM

## 2020-10-27 RX ORDER — AMIODARONE HYDROCHLORIDE 200 MG/1
100 TABLET ORAL DAILY
Status: DISCONTINUED | OUTPATIENT
Start: 2020-10-27 | End: 2020-10-28 | Stop reason: HOSPADM

## 2020-10-27 RX ORDER — POLYETHYLENE GLYCOL 3350 17 G/17G
17 POWDER, FOR SOLUTION ORAL DAILY PRN
Status: DISCONTINUED | OUTPATIENT
Start: 2020-10-27 | End: 2020-10-28 | Stop reason: HOSPADM

## 2020-10-27 RX ORDER — MIDODRINE HYDROCHLORIDE 5 MG/1
10 TABLET ORAL
Status: DISCONTINUED | OUTPATIENT
Start: 2020-10-27 | End: 2020-10-28 | Stop reason: HOSPADM

## 2020-10-27 RX ORDER — ONDANSETRON 2 MG/ML
4 INJECTION INTRAMUSCULAR; INTRAVENOUS EVERY 6 HOURS PRN
Status: DISCONTINUED | OUTPATIENT
Start: 2020-10-27 | End: 2020-10-28 | Stop reason: HOSPADM

## 2020-10-27 RX ADMIN — SODIUM CHLORIDE: 9 INJECTION, SOLUTION INTRAVENOUS at 02:46

## 2020-10-27 RX ADMIN — Medication 10 ML: at 22:06

## 2020-10-27 RX ADMIN — Medication 10 ML: at 09:19

## 2020-10-27 RX ADMIN — ATORVASTATIN CALCIUM 80 MG: 80 TABLET, FILM COATED ORAL at 21:55

## 2020-10-27 RX ADMIN — TRAZODONE HYDROCHLORIDE 150 MG: 50 TABLET ORAL at 21:55

## 2020-10-27 RX ADMIN — HEPARIN SODIUM 5000 UNITS: 5000 INJECTION INTRAVENOUS; SUBCUTANEOUS at 16:56

## 2020-10-27 RX ADMIN — TETROFOSMIN 11.1 MILLICURIE: 1.38 INJECTION, POWDER, LYOPHILIZED, FOR SOLUTION INTRAVENOUS at 13:33

## 2020-10-27 RX ADMIN — CITALOPRAM HYDROBROMIDE 40 MG: 20 TABLET ORAL at 09:16

## 2020-10-27 RX ADMIN — SEVELAMER CARBONATE 3200 MG: 800 TABLET, FILM COATED ORAL at 22:04

## 2020-10-27 RX ADMIN — HEPARIN SODIUM 5000 UNITS: 5000 INJECTION INTRAVENOUS; SUBCUTANEOUS at 21:56

## 2020-10-27 RX ADMIN — SEVELAMER CARBONATE 3200 MG: 800 TABLET, FILM COATED ORAL at 16:52

## 2020-10-27 RX ADMIN — AMIODARONE HYDROCHLORIDE 100 MG: 200 TABLET ORAL at 09:15

## 2020-10-27 RX ADMIN — FAMOTIDINE 20 MG: 10 INJECTION INTRAVENOUS at 09:18

## 2020-10-27 RX ADMIN — MIDODRINE HYDROCHLORIDE 10 MG: 5 TABLET ORAL at 16:53

## 2020-10-27 RX ADMIN — ASPIRIN 81 MG: 81 TABLET, CHEWABLE ORAL at 09:17

## 2020-10-27 RX ADMIN — REGADENOSON 0.4 MG: 0.08 INJECTION, SOLUTION INTRAVENOUS at 15:15

## 2020-10-27 RX ADMIN — TETROFOSMIN 34.6 MILLICURIE: 1.38 INJECTION, POWDER, LYOPHILIZED, FOR SOLUTION INTRAVENOUS at 15:05

## 2020-10-27 RX ADMIN — HEPARIN SODIUM 5000 UNITS: 5000 INJECTION INTRAVENOUS; SUBCUTANEOUS at 05:56

## 2020-10-27 ASSESSMENT — PAIN SCALES - GENERAL
PAINLEVEL_OUTOF10: 0
PAINLEVEL_OUTOF10: 0

## 2020-10-27 ASSESSMENT — ENCOUNTER SYMPTOMS: SHORTNESS OF BREATH: 1

## 2020-10-27 NOTE — PROGRESS NOTES
Inpatient Family Medicine   Medical Student Progress Note    (This note is for educational purposes only)      PCP: Chapis Reilly    Date of Admission: 10/26/2020    Chief Complaint: Chest pain and dizziness    Hospital Course:   56 y. o. male with PMH significant for ESRD on HD, paroxysmal afib (not on anticoagulation due to bleeding from HD site, s/p watchman), DM (diet controlled), COPD, CHF, CAD, HTN, and impaired vision who presented to [facility] with dizziness and chest pain which began during dialysis on 10/26.  Pt stated that he uses midodrine during dialysis to help maintain his BP, when it gets low he sometimes feels dizzy.  Typically during dialysis about 4L of fluid is removed, he believes they took 4.5L today.    Received 2 doses of midodrine during dialysis with out any improvement of dizziness. He also developed chest pain which was sharp on the left side of his chest, radiating into his left shoulder.  It lasted about 2 minutes, and has been coming and going since the episode first began during dialysis. Maxine Sweeney notes that he has had it before, but it usually does not last this long.  He does admit to worsened SOB with activity, and states this has been going on for a couple of years.  His dizziness and chest pain had not resolved completely after going home from dialysis. After using the bathroom he became very dizzy and very SOB, so his step daughter called EMS. Maxine Sweeney is normally on 2L of O2 at home, but had to increase to 4L prior to EMS arrival and was placed on 3L in the ED. Maxine Sweeney denies any headache, fever, chills, N/V, abd pain, dysuria, diarrhea, and constipation.  Ambulates with a walker or cane at home. In the ED, patient's blood pressure was 80-90s/50-60s. EKG was done with NSR. Troponin was 0.4mg. Was given midodrine 5mg for low BP and aspirin 243mg for chest pain and admitted to the floor for further observation and cardiac workup. Subjective:   Patient is stable today.  Denies any further dizziness or chest pain since admission to ED. Only take midodrine during dialysis. States he has experienced stabbing left sided chest pain in the past, which can radiate to both arms. Happens 2-3 times weekly, but generally resolves after 15 minutes. Has dialysis 3 times weekly. Denies fever, chill, sweats, nausea, vomiting, abdominal pain, constipation, diarrhea. Medications:  Reviewed    Infusion Medications   Scheduled Medications    amiodarone  100 mg Oral Daily    citalopram  40 mg Oral Daily    sevelamer  3,200 mg Oral TID    traZODone  150 mg Oral Nightly    midodrine  10 mg Oral TID WC    sodium chloride flush  10 mL Intravenous 2 times per day    atorvastatin  80 mg Oral Nightly    aspirin  81 mg Oral Daily    heparin (porcine)  5,000 Units Subcutaneous 3 times per day    famotidine (PEPCID) injection  20 mg Intravenous Daily     PRN Meds: albuterol, ipratropium-albuterol, sodium chloride flush, acetaminophen **OR** acetaminophen, polyethylene glycol, promethazine **OR** ondansetron, perflutren lipid microspheres, nitroGLYCERIN, regadenoson      Intake/Output Summary (Last 24 hours) at 10/27/2020 0845  Last data filed at 10/27/2020 0719  Gross per 24 hour   Intake 0 ml   Output --   Net 0 ml       Physical Exam Performed:    /82   Pulse 75   Temp 98 °F (36.7 °C) (Oral)   Resp 16   Wt 244 lb (110.7 kg)   SpO2 99%   BMI 36.03 kg/m²     General appearance: No apparent distress, appears stated age and cooperative. HEENT: Pupils equal, round, and reactive to light. Conjunctivae/corneas clear. Neck: Supple, with full range of motion. No jugular venous distention. Trachea midline. Respiratory:  Normal respiratory effort. Clear to auscultation, bilaterally without Rales/Wheezes/Rhonchi. Cardiovascular: Regular rate and rhythm with normal S1/S2 without murmurs, rubs or gallops. AV Fistula in right forearm.    Abdomen: Soft, non-tender, non-distended with normal bowel sounds. Musculoskeletal: No clubbing, cyanosis or edema bilaterally. Full range of motion without deformity. Skin: Skin color, texture, turgor normal.  No rashes or lesions. Neurologic:  Neurovascularly intact without any focal sensory/motor deficits. Cranial nerves: II-XII intact, grossly non-focal.  Psychiatric: Alert and oriented, thought content appropriate, normal insight  Capillary Refill: Brisk,< 3 seconds   Peripheral Pulses: +2 palpable, equal bilaterally       Labs:   Recent Labs     10/26/20  1832 10/27/20  0621   WBC 8.8 7.4   HGB 12.8* 12.3*   HCT 39.1* 37.7*    243     Recent Labs     10/26/20  1832 10/27/20  0621   * 135*   K 4.5 4.6   CL 96* 99   CO2 29 26   BUN 18 26*   CREATININE 5.2* 6.2*   CALCIUM 9.9 9.7     Recent Labs     10/26/20  1832 10/27/20  0621   AST 22 12*   ALT 11 9*   BILITOT <0.2 <0.2   ALKPHOS 124 113     No results for input(s): INR in the last 72 hours. Recent Labs     10/26/20  1832 10/27/20  0126   TROPONINI 0.04* 0.03*       Urinalysis:      Lab Results   Component Value Date    NITRU Negative 03/30/2015    WBCUA >100 03/30/2015    BACTERIA 4+ 09/16/2010    RBCUA >100 03/30/2015    BLOODU LARGE 03/30/2015    SPECGRAV 1.020 03/30/2015    GLUCOSEU 100 03/30/2015    GLUCOSEU 100 09/16/2010       Radiology:  XR CHEST PORTABLE   Final Result   No significant change in appearance of the chest with persistent pleural   effusions and bibasilar opacities.          NM Cardiac Stress Test Nuclear Imaging    (Results Pending)       Assessment/Plan:    Active Hospital Problems    Diagnosis Date Noted    Chest pain [R07.9] 10/26/2020    Dizziness [R42]     Hypotension [I95.9] 08/01/2020       Chest pain  -Stress test 1/2019: normal perfusion with normal LV function and wall motion  -Echo 1/2019: LVEF 55%, Watchman device in place with no leak  -EKG: NSR 75 bpm, no signs of ischemia, compared to 7/31/2020 no longer with QT prolongation, or nonspecific St and T wave abnoramlities  -Troponin 0.03. Previous troponin 0.4  -Dobutamine Stress test today, NPO as of midnight  -Echo today  -Lipid panel  -Continue to monitor Tele  -Cardiology consulted  -Start Aspirin  -Start Statin  -Heparin  -Hold beta blocker due to hypotension.  -Nitroglycerin if needed and if BP can tolerate it  -continue O2 as needed, home O2 is 2L  -No further chest pain after blood pressure normalized. -Suspect supply ischemia due to persistent low blood pressure.     Hypotension  -Blood pressure has normalized after 250ml NS bolus and midodrine.  -Monitor BP  -Gentle IVF if needed, will be cautious since pt has hx of heart failure and renal failure  -Continue midodrine on a daily basis. -Avoid dehydration.     Dizziness likely 2/2 to hypotension  -Bolus of 250 cc of NS  -Resolved     SOB  -Hx of COPD and HF  -Continue O2 as needed  -Baseline is 2L at home  -Gentle fluids. monitor for fluid overload  -May use nebulizer as needed  -Currently no different than baseline     DM  -HgbA1c ordered, last one was in the 5s  -Will need low carb diet once able to eat again    DVT Prophylaxis: Heparin  Diet: Diet NPO, After Midnight  Code Status: Full Code  PT/OT Eval Status: N/A    Dispo - Pending further cardiac workup. This patient was seen and evaluated with the resident team and attending, Dr. Keenan Morris. Jo Barnhart, OMS-III  A. T.  [address] [place] [address] Osteopathic Medicine in [place]

## 2020-10-27 NOTE — CARE COORDINATION
CASE MANAGEMENT INITIAL ASSESSMENT      Reviewed chart and completed assessment with: Patient   Explained Case Management role/services. Primary contact information:Tessa Andino- step dtr     Health Care Decision Maker: Reviewed   Who do you trust or have selected to make healthcare decisions for you? Name:   Vincent Pineda   Phone  Number: [phone]  Can this person be reached and be able to respond quickly, such as within a few minutes or hours? Yes  Who would be your back-up decision maker? Name Asia Fernández   Phone Number:[phone]    Admit date/status:10/26/2020- Observation   Diagnosis: Chest pain   Is this a Readmission?:  No      Insurance:[employer] required for SNF: Yes       3 night stay required: No    Living arrangements, Adls, care needs, prior to admission: Reports living at home with support from step  dtr and [facility] services    Transportation: Supported thru [facility], will need transport at [facility] at home:  Walker_x_Cane_x_RTS__ BSC__Shower Chair__  02__ HHN__ CPAP__  BiPap__  Hospital Bed__ W/C__x_ Other__________    Services in the home and/or outpatient, prior to admission: Prior R Gabriela Sierra 1, transportation thru [employer] [address] (if applicable)   · Name: [facility]  · Dialysis Schedule: MWF  · Phone: [phone]  · Fax:[phone]    PT/OT recs: Will follow for [facility] Exemption Notification (HEN):NA    Barriers to discharge: transport home    Plan/comments: Patient reports lives at home with step dtr. Patient using cane/WC for ambulation within the home. Patient with ramp entrance. Patient has transport thru [facility]. Patient dialysis MWF at [facility] chair time 7a-11a. Transport scheduled thru [id number] [employer] for anticipated dc home  between 6:30- 7pm. Patient is aware denies needs.  CATHERINE Corral       [facility] on chart for MD signature    1600: Notified that Medicare has accepted transport call [phone] if dc changes. 1630:  Call placed to Medicare transport ph [phone] cancel transportation as Patient not cleared for dc. Vm left as no answer.  CATHERINE Thomas
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
## 2020-10-27 NOTE — DISCHARGE INSTR - COC
Continuity of Care Form    Patient Name: Anh Cueva   :  1960  MRN:  [id number]    Admit date:  10/26/2020  Discharge date:  ***    Code Status Order: Full Code   Advance Directives:   [facility] Documentation     Date/Time Healthcare Directive Type of Healthcare Directive Copy in [facility] [address] Agent's Name Healthcare Agent's Phone Number    10/27/20 7101  Other (Comment) Patient does not know -- -- -- -- --          Admitting Physician:  Bebeto Melo DO  PCP: Demarcus Cruz    Discharging Nurse: [facility] Unit/Room#: 7296/2260-00  Discharging Unit Phone Number: ***    Emergency Contact:   Extended Emergency Contact Information  Primary Emergency Contact: [address]  Phone: [phone]  Mobile Phone: [phone]  Relation: Other    Past Surgical History:  Past Surgical History:   Procedure Laterality Date    BRONCHOSCOPY  10/10/2012    COLONOSCOPY  2012    3 cold snared polyps, esophagus BX for Barretts.     DIALYSIS FISTULA CREATION Right     INSERTABLE CARDIAC MONITOR  2018    WATCHMAN PROCEDURE WITH SWETHA    THORACENTESIS      THORACOSCOPY Right 2013    with biopsy    TOE AMPUTATION Right 2020    AMPUTATION OF RIGHT GREAT TOE performed by Tina Mcgill DPM at [address] [place] [address] Road TRANSESOPHAGEAL ECHOCARDIOGRAM  2018    Watchman in good place       Immunization History:   Immunization History   Administered Date(s) Administered    FLUZONE 3 YEARS AND OVER 10/01/2015    Influenza Vaccine, unspecified formulation 2016    Influenza Virus Vaccine 10/01/2014, 2017    Influenza Whole 10/16/2015    Influenza, Quadv, 6 mo and older, IM, PF (Flulaval, Fluarix) 2018    Pneumococcal Conjugate 7-valent (Prevnar7) 2015       Active Problems:  Patient Active Problem List   Diagnosis Code    Recurrent right pleural effusion J90    HLD (hyperlipidemia) E78.5    Blindness of left eye with low vision in contralateral eye H54.40, H54.50    History of anemia due to CKD N18.9, Z86.2    Bilateral LE lymphedema and venous stasis dermatitis I87.2    Paroxysmal atrial fibrillation ([facility]) I48.0    ESRD on hemodialysis ([facility]) N18.6, Z99.2    DM2 (diabetes mellitus, type 2) ([facility]) E11.9    Chronic diastolic (congestive) heart failure ([facility]) I50.32    Typical atrial flutter ([facility]) I48.3    Suspected sleep apnea R29.818    Renovascular hypertension I15.0    Rosario's esophagus K22.70    CAD (coronary artery disease) I25.10    Chronic hypoxemic respiratory failure on 3 L home O2 J96.11    Depression F32.9    SIRS (systemic inflammatory response syndrome) ([facility]) R65.10    Former smoker Z87.891    Pulmonary edema J81.1    Splenic calcification D73.89    Tricuspid regurgitation I07.1    Leukocytosis D72.829    Hyperglycemia R73.9    Osteomyelitis of great toe of right foot ([facility]) M86.9    Essential hypertension I10    Hypotension I95.9    Migraine headache G43.909    Dizziness R42    ESRD (end stage renal disease) on dialysis ([facility]) N18.6, Z99.2    Chest pain R07.9       Isolation/Infection:   Isolation          No Isolation        Patient Infection Status     Infection Onset Added Last Indicated Last Indicated By Review Planned Expiration Resolved Resolved By    None active    Resolved    MRSA 04/17/20 04/19/20 04/17/20 Culture, Tissue   10/27/20 Susie Lizarraga RN    MRSA 04/17/20 04/18/20 04/17/20 Culture, Tissue   04/18/20 Chuy Huitron RN          Nurse Assessment:  Last Vital Signs: /69   Pulse 68   Temp 97.5 °F (36.4 °C) (Oral)   Resp 16   Ht 5' 11\" (1.803 m)   Wt 244 lb (110.7 kg)   SpO2 96%   BMI 34.03 kg/m²     Last documented pain score (0-10 scale): Pain Level: 0  Last Weight:   Wt Readings from Last 1 Encounters:   10/27/20 244 lb (110.7 kg)     Mental Status:  {IP PT MENTAL STATUS:20030}    IV Access:  {[facility] IV ACCESS:655983166}    Nursing Mobility/ADLs:  Walking   {CHP DME WNIC:113479502}  Transfer  {CHP DME XWCH:790121852}  Bathing  {CHP DME UPMN:464719322}  Dressing  {CHP DME DLRK:371737194}  Toileting  {CHP DME HEVF:424940658}  Feeding  {CHP DME ICYF:817386211}  Med Admin  {P DME TWRO:018720757}  Med Delivery   { MISSY MED Delivery:961235533}    Wound Care Documentation and Therapy:        Elimination:  Continence:   · Bowel: {YES / SZ:87902}  · Bladder: {YES / PU:06406}  Urinary Catheter: {Urinary Catheter:401486831}   Colostomy/Ileostomy/Ileal Conduit: {YES / FV:05513}       Date of Last BM: ***    Intake/Output Summary (Last 24 hours) at 10/27/2020 1303  Last data filed at 10/27/2020 0719  Gross per 24 hour   Intake 0 ml   Output --   Net 0 ml     No intake/output data recorded.     Safety Concerns:     [address] [employer] Safety Concerns:054539948}    Impairments/Disabilities:      [address] [employer] Impairments/Disabilities:794397489}    Nutrition Therapy:  Current Nutrition Therapy:   [address] [employer] Diet List:303129465}    Routes of Feeding: {[facility] DME Other Feedings:544084991}  Liquids: {Slp liquid thickness:71445}  Daily Fluid Restriction: {P DME Yes amt example:562118452}  Last Modified Barium Swallow with Video (Video Swallowing Test): {Done Not Done VFLN:350580371}    Treatments at the Time of Hospital Discharge:   Respiratory Treatments: ***  Oxygen Therapy:  {Therapy; copd oxygen:92189}  Ventilator:    { CC Vent BTTL:694172965}    Rehab Therapies: {THERAPEUTIC INTERVENTION:2981041878}  Weight Bearing Status/Restrictions: [address] [employer] Weight Bearin}  Other Medical Equipment (for information only, NOT a DME order):  {EQUIPMENT:238331810}  Other Treatments: ***    Patient's personal belongings (please select all that are sent with patient):  {[facility] DME Belongings:701354337}    RN SIGNATURE:  {Esignature:694722621}    CASE MANAGEMENT/SOCIAL WORK SECTION    Inpatient Status Date: 10/26/2020    [facility]- Andrzej Standing   Ph- [phone]  Fax- [phone]  BRIAN / signature: Electronically signed by CATHERINE Bai on 10/27/20 at 1:13 PM EDT    PHYSICIAN SECTION    Prognosis: {Prognosis:4693655936}    Condition at Discharge: [address] K[facility] Robison Patient Condition:088942935}    Rehab Potential (if transferring to Rehab): {Prognosis:7804475887}    Recommended Labs or Other Treatments After Discharge: ***    Physician Certification: I certify the above information and transfer of Zahra Blancas  is necessary for the continuing treatment of the diagnosis listed and that he requires {Admit to Appropriate Level of Care:38783} for {GREATER/LESS:120891975} 30 days.      Update Admission H&P: {CHP DME Changes in MBTF}    PHYSICIAN SIGNATURE:  {Esignature:672822199}

## 2020-10-27 NOTE — PROGRESS NOTES
Inpatient Family Medicine   Progress Note      PCP: Shad Alvarado    Date of Admission: 10/26/2020    Chief Complaint: Dizziness and Chest Pain    Hospital Course:   56 y.o. male with PMH significant for ESRD on HD, paroxysmal afib (not on anticoagulation due to bleeding from HD site, s/p watchman), DM (diet controlled), COPD, CHF, CAD, HTN, and impaired vision who presented to [facility] with dizziness and chest pain which began during dialysis on 10/26. Pt stated that he uses midodrine during dialysis to help maintain his BP, when it gets low he sometimes feels dizzy. Typically during dialysis about 4L of fluid is removed, he believes they took 4.5L today. Received 2 doses of midodrine during dialysis with out any improvement of dizziness. He also developed chest pain which was sharp on the left side of his chest, radiating into his left shoulder. It lasted about 2 minutes, and has been coming and going since the episode first began during dialysis. He notes that he has had it before, but it usually does not last this long. He does admit to worsened SOB with activity, and states this has been going on for a couple of years. His dizziness and chest pain had not resolved completely after going home from dialysis. After using the bathroom he became very dizzy and very SOB, so his step daughter called EMS. He is normally on 2L of O2 at home, but had to increase to 4L prior to EMS arrival and was placed on 3L in the ED. He denies any headache, fever, chills, N/V, abd pain, dysuria, diarrhea, and constipation. Ambulates with a walker or cane at home.     In the ED, patient was noted to have some hypotension with BP 80-90s/50-60s. Troponin was 0.04 which is patient's baseline. EKG was normal sinus rhythm. Patient was given midodrine 5 mg as well as aspirin 243 mg. In the hospital, patient was doing well. Reports that his dizziness and chest pain had resolved.   He was stable on his home oxygen level 2 L. Subjective:   Patient resting comfortably in bed. Denies any issues or concerns today. Denies any chest pain since yesterday. States that he has stabbing chest pain in the left sternal border which sometimes radiates across his chest and down his left arm occurring approximately 2-3 times a week for unspecified length of time. The episodes last approximately 10 to 15 minutes and then spontaneously resolved. States that they occur randomly sometimes with activity but also at rest.  He denies any dizziness currently. Denies any fevers, chills, chest pain, shortness of breath, nausea, vomiting, abdominal pain, diarrhea, or constipation. Medications:  Reviewed    Infusion Medications   Scheduled Medications    amiodarone  100 mg Oral Daily    citalopram  40 mg Oral Daily    sevelamer  3,200 mg Oral TID    traZODone  150 mg Oral Nightly    midodrine  10 mg Oral TID WC    sodium chloride flush  10 mL Intravenous 2 times per day    atorvastatin  80 mg Oral Nightly    aspirin  81 mg Oral Daily    heparin (porcine)  5,000 Units Subcutaneous 3 times per day    famotidine (PEPCID) injection  20 mg Intravenous Daily     PRN Meds: albuterol, ipratropium-albuterol, sodium chloride flush, acetaminophen **OR** acetaminophen, polyethylene glycol, promethazine **OR** ondansetron, perflutren lipid microspheres, nitroGLYCERIN, regadenoson      Intake/Output Summary (Last 24 hours) at 10/27/2020 1029  Last data filed at 10/27/2020 0719  Gross per 24 hour   Intake 0 ml   Output --   Net 0 ml       Physical Exam Performed:    /82   Pulse 75   Temp 98 °F (36.7 °C) (Oral)   Resp 16   Wt 244 lb (110.7 kg)   SpO2 99%   BMI 36.03 kg/m²     General appearance: No apparent distress, appears stated age and cooperative. HEENT: Conjunctivae/corneas clear. Patient speaking with his eyes closed due to history of left eye blindness and visual impairment in the right eye.   Neck: Supple, with full range of motion. No jugular venous distention. Trachea midline. Respiratory:  Normal respiratory effort. Clear to auscultation, bilaterally without Rales/Wheezes/Rhonchi. Cardiovascular: Regular rate and rhythm with normal S1/S2 without murmurs, rubs or gallops. Abdomen: Soft, non-tender, non-distended with normal bowel sounds. Musculoskeletal: No clubbing, cyanosis or edema bilaterally. Full range of motion without deformity. Skin: Skin color, texture, turgor normal.  No rashes or lesions. AV fistula noted in the right forearm with palpable thrill. Neurologic:  Neurovascularly intact without any focal sensory/motor deficits. Grossly non-focal.  Psychiatric: Alert and oriented, thought content appropriate, normal insight  Extremities: No pitting edema      Labs:   Recent Labs     10/26/20  1832 10/27/20  0621   WBC 8.8 7.4   HGB 12.8* 12.3*   HCT 39.1* 37.7*    243     Recent Labs     10/26/20  1832 10/27/20  0621   * 135*   K 4.5 4.6   CL 96* 99   CO2 29 26   BUN 18 26*   CREATININE 5.2* 6.2*   CALCIUM 9.9 9.7     Recent Labs     10/26/20  1832 10/27/20  0621   AST 22 12*   ALT 11 9*   BILITOT <0.2 <0.2   ALKPHOS 124 113     No results for input(s): INR in the last 72 hours. Recent Labs     10/26/20  1832 10/27/20  0126   TROPONINI 0.04* 0.03*       Urinalysis:      Lab Results   Component Value Date    NITRU Negative 03/30/2015    WBCUA >100 03/30/2015    BACTERIA 4+ 09/16/2010    RBCUA >100 03/30/2015    BLOODU LARGE 03/30/2015    SPECGRAV 1.020 03/30/2015    GLUCOSEU 100 03/30/2015    GLUCOSEU 100 09/16/2010       Radiology:  XR CHEST PORTABLE   Final Result   No significant change in appearance of the chest with persistent pleural   effusions and bibasilar opacities.          NM Cardiac Stress Test Nuclear Imaging    (Results Pending)           Assessment/Plan:    Active Hospital Problems    Diagnosis Date Noted    Chest pain [R07.9] 10/26/2020    Dizziness [R42]     Hypotension [I95.9] 08/01/2020    ESRD on hemodialysis ([facility] [address] [I86.5, Z99.2] 03/27/2015       Atypical Chest Pain  -Last stress test and Echo in January 2019 which was normal perfusion with normal LV function and wall motion. Echo 1/2019: LVEF 55%, Watchman device in place with no leak. -Normal EKG in ED with elevated troponin of 0.04 which is patient's baseline. Repeat troponin overnight 0.03.    -Pro-BNP 4,064 on admission  -Lipid panel  -Echo ordered  -Stress test today  -Cardiology consulted  -Aspirin, Statin, Heparin  -Hold beta blocker due to hypotension (typically takes Coreg 50mg BID per chart review)  -Currently on home level 2L of O2     Hypotension  -Improved   -Most recent BP: 139/82 at 0800  -BP range x24 hours: 83 239/54-82  -Continue home midodrine 10mg TID  -s/p Bolus 250 cc of NS  -s/p Midodrine 5mg in ED at [address] on 10/27  -Monitor BP     Dizziness   -Resolved  -Likely 2/2 to hypotension  -s/p Bolus of 250 cc of NS  -s/p Midodrine 5mg at [address] on 10/27  -Continue home midodrine 10mg TID    End-stage renal disease on dialysis  -Patient currently receives outpatient dialysis on Monday, Wednesday, and Friday  -Cr on admission 5.2 --> 6.2 today (lowest in the last year was 3.9 back in August 2019. Typically Cr in the 5-7 range)    Chronic SOB  -Stable  -Hx of COPD and CHF  -Currently on baseline O2 of 2L   -May use nebulizer as needed     T2DM  -Currently diet controlled  -HgbA1c ordered, was 5.5 in April 2020  -Will need low carb diet once able to eat again      DVT Prophylaxis: Heparin    Diet: Diet NPO, After Midnight     Code Status: Full Code     PT/OT Eval Status: Not consulted. Patient currently lives in a mobile home with his stepdaughter. Feels safe at home. Ambulates with the assistance of a walker/cane. Dispo - Improving. Patient denies any chest pain or dizziness currently. Plan to proceed with stress test and echo today due to intermittent chest pain.   Anticipate possible discharge today or tomorrow pending results of stress test and echo. This patient was seen and evaluated with the resident team and attending, Dr. Antoni Schaeffer. Marjan Bundy D.O.   [facility] Source of [facility] Resident  PGY-3  (available by [address] [place] [address])

## 2020-10-27 NOTE — PLAN OF CARE
Problem: Nutrition  Goal: Optimal nutrition therapy  Outcome: Ongoing  Note: Nutrition Problem #1: Predicted inadequate energy intake  Intervention: Food and/or Nutrient Delivery: Start Oral Diet  Nutritional Goals: Pt will consume greater than 50% of meals this admission

## 2020-10-27 NOTE — PROGRESS NOTES
4 Eyes Skin Assessment     NAME:  Sharyn Downing  YOB: 1960  MEDICAL RECORD NUMBER:  [id number]    The patient is being assess for  Admission    I agree that 2 RN's have performed a thorough Head to Toe Skin Assessment on the patient. ALL assessment sites listed below have been assessed. Areas assessed by both nurses:    Head, Face, Ears, Shoulders, Back, Chest, Arms, Elbows, Hands, Sacrum. Buttock, Coccyx, Ischium and Legs. Feet and Heels        Does the Patient have a Wound?  No noted wound(s)       Damian Prevention initiated:  NA   Wound Care Orders initiated:  NA    Pressure Injury (Stage 3,4, Unstageable, DTI, NWPT, and Complex wounds) if present place consult order under [de-identified] NA    New and Established Ostomies if present place consult order under : NA      Nurse 1 eSignature: Electronically signed by Mojgan Harp RN on 10/27/20 at 4:00 AM EDT    **SHARE this note so that the co-signing nurse is able to place an eSignature**    Nurse 2 eSignature: Sneha Dolan

## 2020-10-27 NOTE — PROGRESS NOTES
Comprehensive Nutrition Assessment    Type and Reason for Visit:  Initial, Positive Nutrition Screen(chew/swallow difficulties)    Nutrition Recommendations/Plan:   1. Advance diet to cardiac, carb control when medically feasible   2. Monitor chew/swallow difficulties and need for SLP evaluation   3. Monitor nutrition adequacy, pertinent labs, bowel habits, wt changes, and clinical progress    Nutrition Assessment:  Pt admitted with chest pain. Hx of CAD, CHF, ESRD and DM. Currently NPO for stress test this afternoon. Pt unavailable after multiple attempts. Pt nutritionally at risk AEB positive IP for chew/swallow difficulties. Noted poor dentition and occasional difficulty with swallowing. Consider SLP evaluation if showing s/s of aspiration. Weights appear to be trending up per EMR weight hx. Will continue to monitor. Malnutrition Assessment:  Malnutrition Status: At risk for malnutrition (Comment)      Estimated Daily Nutrient Needs:  Energy (kcal):  6833-3311 kcal; Weight Used for Energy Requirements:  Ideal(78 kg)     Protein (g):   g; Weight Used for Protein Requirements:  Ideal(1.2-1.5 g/kg)          Nutrition Related Findings:  Active BS, BM 10/26, A1c of 5.8 on 10/27/20, BG stable      Wounds:  None       Current Nutrition Therapies:    Diet NPO, After Midnight    Anthropometric Measures:  · Height: 5' 11\" (180.3 cm)  · Current Body Weight: 244 lb (110.7 kg)    · Usual Body Weight: 242 lb (109.8 kg)(7/31/20)     · Ideal Body Weight: 172 lbs; % Ideal Body Weight 141.9 %   · BMI: 34  · BMI Categories: Obese Class 1 (BMI 30.0-34. 9)       Nutrition Diagnosis:   · Predicted inadequate energy intake related to swallowing difficulty as evidenced by poor dentition      Nutrition Interventions:   Food and/or Nutrient Delivery:  Start Oral Diet  Nutrition Education/Counseling:  No recommendation at this time   Coordination of Nutrition Care:  Continue to monitor while inpatient    Goals:  Pt will consume greater than 50% of meals this admission       Nutrition Monitoring and Evaluation:   Food/Nutrient Intake Outcomes:  Food and Nutrient Intake, Diet Advancement/Tolerance  Physical Signs/Symptoms Outcomes:  Biochemical Data, Chewing or Swallowing, Weight     Discharge Planning:     Too soon to determine     Electronically signed by Cherry Aldridge MS, RD, LD on 10/27/20 at 3:14 PM EDT    Contact: [phone]

## 2020-10-27 NOTE — H&P
every 4 hours as needed for Headaches 8/5/20   Yanique Sanchez MD   midodrine (PROAMATINE) 10 MG tablet Take 1 tablet by mouth 3 times daily (with meals) 8/3/20   Joseline Arroyo MD   albuterol sulfate HFA (PROVENTIL HFA) 108 (90 Base) MCG/ACT inhaler Inhale 2 puffs into the lungs every 6 hours as needed for Wheezing 3/15/20 3/15/21  Kristian Stanley MD   fluticasone Miracle Fillers) 50 MCG/ACT nasal spray 1 spray by Each Nostril route daily 2/2/20   Zondra Clap V, DO   Chlorpheniramine-DM 4-30 MG TABS Take 1 tablet by mouth 3 times daily as needed (CONGESTION) 2/2/20   Estefany Reas, DO   Respiratory Therapy Supplies (NEBULIZER COMPRESSOR) KIT 1 kit by Does not apply route once for 1 dose 2/2/20 2/2/20  Zondra Clap V, DO   albuterol (PROVENTIL) (2.5 MG/3ML) 0.083% nebulizer solution Take 3 mLs by nebulization every 4 hours as needed for Wheezing or Shortness of Breath 2/2/20   Estefany Reas, DO   aspirin EC 81 MG EC tablet Take 2 tablets by mouth daily 1/7/19   Cuca Rutledge MD   atorvastatin (LIPITOR) 40 MG tablet Take 1 tablet by mouth daily 1/5/19   Noah Landon DO   ipratropium-albuterol (DUONEB) 0.5-2.5 (3) MG/3ML SOLN nebulizer solution Inhale 3 mLs into the lungs every 6 hours as needed for Shortness of Breath (dyspnea or wheezes. ) DX PULM HTN I27.20 8/13/18   Ashley Huitron MD   B Complex-C-Folic Acid (TRIPHROCAPS PO) Take by mouth    Historical Provider, MD   docusate sodium (COLACE, DULCOLAX) 100 MG CAPS Take 100 mg by mouth 2 times daily 6/15/18   Juan C Suggs MD   citalopram (CELEXA) 40 MG tablet Take 1 tablet by mouth daily 6/16/18   Juan C Suggs MD   OXYGEN Inhale 2 L into the lungs continuous With concentrator and portability  Patient taking differently: Inhale 3 L into the lungs continuous With concentrator and portability 2/23/16   Rodney Boggs MD   traZODone (DESYREL) 150 MG tablet Take 150 mg by mouth nightly.     Historical Provider, MD   sevelamer (RENVELA) 800 MG tablet Take 4 tablets by mouth 3 times daily     Historical Provider, MD       Allergies:  Bee pollen; Codeine; and Rosiglitazone    Social History:      The patient currently lives at home with his step daughter    TOBACCO:   reports that he quit smoking about 43 years ago. His smoking use included cigarettes. He has a 4.50 pack-year smoking history. He has never used smokeless tobacco.  ETOH:   reports no history of alcohol use. Family History:      Reviewed in detail and Positive as follows:        Problem Relation Age of Onset   Quinones Cancer Mother     Diabetes Father     Cancer Sister     Asthma Neg Hx     Emphysema Neg Hx     Heart Failure Neg Hx     Hypertension Neg Hx        REVIEW OF SYSTEMS:   Pertinent positives as noted in the HPI. All other systems reviewed and negative. PHYSICAL EXAM PERFORMED:    BP (!) 92/54   Pulse 68   Temp 98.4 °F (36.9 °C) (Oral)   Resp 21   Wt 242 lb 8.1 oz (110 kg)   SpO2 98%   BMI 35.81 kg/m²     General appearance:  Mild distress, appears older than stated age, and cooperative. HEENT:  Normal cephalic, atraumatic without obvious deformity. Conjunctivae clear. Neck: Supple. No jugular venous distention. Trachea midline. Respiratory:  Normal respiratory effort. Scattered expiratory wheezes, decreased at bases BL  Cardiovascular:  Regular rate and rhythm with normal S1/S2   Abdomen: +BS, mildly distended, soft, non-tender  Musculoskeletal:  No clubbing, cyanosis or edema bilaterally.  Fistula right forearm with palpable thrill  Skin: Warm and dry  Neurologic:  Cranial nerves: III-XII intact, known visual deficits, no other focal deficits, no gross sensory or motor deficits  Psychiatric:  Alert and oriented, thought content appropriate, normal insight  Capillary Refill: Brisk,< 3 seconds   Peripheral Pulses: +2 palpable, equal bilaterally       Labs:     Recent Labs     10/26/20  1832   WBC 8.8   HGB 12.8*   HCT 39.1*        Recent Labs     10/26/20  1832   NA 133*   K 4.5   CL 96*   CO2 29   BUN 18   CREATININE 5.2*   CALCIUM 9.9     Recent Labs     10/26/20  1832   AST 22   ALT 11   BILITOT <0.2   ALKPHOS 124     No results for input(s): INR in the last 72 hours. Recent Labs     10/26/20  1832   TROPONINI 0.04*       Urinalysis:      Lab Results   Component Value Date    NITRU Negative 03/30/2015    WBCUA >100 03/30/2015    BACTERIA 4+ 09/16/2010    RBCUA >100 03/30/2015    BLOODU LARGE 03/30/2015    SPECGRAV 1.020 03/30/2015    GLUCOSEU 100 03/30/2015    GLUCOSEU 100 09/16/2010       Radiology:     XR CHEST PORTABLE   Final Result   No significant change in appearance of the chest with persistent pleural   effusions and bibasilar opacities.              ASSESSMENT:    Active Hospital Problems    Diagnosis Date Noted    Chest pain [R07.9] 10/26/2020         PLAN:    Chest pain  -Last stress test and echo where in January of 2019  -Stress test 1/2019: normal perfusion with normal LV function and wall motion  -Echo 1/2019: LVEF 55%, Watchman device in place with no leak  -EKG: NSR 75 bpm, no signs of ischemia, compared to 7/31/2020 no longer with QT prolongation, or nonspecific St and T wave abnoramlities  -Troponin 0.04, this is baseline previous troponin 0.05  -Trend troponin  -Pro-BNP  -Lipid panel  -Tele  -Echo  -Stress test tomorrow, NPO at midnight  -Cardiology consulted  -Aspirin  -Statin  -Heparin  -Hold beta blocker due to hypotension  -Nitroglycerin if needed and if BP can tolerate it  -continue O2 as needed, home O2 is 2L    Hypotension  -Continue home midodrine  -Bolus 250 cc of NS  -Monitor BP  -Then gentle IVF if needed, will be cautious since pt has hx of heart failure and renal failure    Dizziness likely 2/2 to hypotension  -Bolus of 250 cc of NS    SOB  -Hx of COPD and HF  -Continue O2 as needed  -Baseline is 2L at home  -Gentle fluids to improve hypotension, monitor for fluid overload  -May use nebulizer as needed    DM  -HgbA1c ordered, last one was in the 5s  -Will need low carb diet once able to eat again      DVT Prophylaxis: Heparin  Diet: CLD, NPO at midnight  Code Status: Full    PT/OT Eval Status: Not consulted    Dispo - Pending cardiac workup    This patient was seen and evaluated with resident team and attending, Dr. Jun Hurt, DO    Family Medicine Resident PGY3

## 2020-10-27 NOTE — ED PROVIDER NOTES
I personally evaluated and examined the patient in conjunction with the SARATH and agree with the assessment, treatment plan and disposition of the patient as recorded by the SARATH. I reviewed pertinent nursing notes, triage notes, vital signs, past medical history, family and social history, medications, and allergies. Complete review of systems was conducted by the SARATH and/or myself. Review of systems is negative except as documented in the history of present illness. Brief HPI: 68-year-old male presents to the emergency department with chief complaint of lightheadedness, shortness of breath and chest pain. He was lightheaded at dialysis and got a dose of midodrine. He went home and still felt quite short of breath as well as lightheaded and had some central chest pressure 4/10 without radiation. No alleviating or aggravating factors. Last stress test was in January 2019. He has never had a heart catheterization. Physical Exam: General: Patient is in no acute distress   Head: Normocephalic, atraumatic, pupils are equal and reactive to light. EOMI. Neck: Neck is supple. No JVD noted. Cardiovascular: Capillary refill less than 2 seconds  Lungs: No respiratory distress  Abdomen: soft, non-tender, non-distended   Extremities: no lower extremity edema. Capillary refill is less than 2 seconds   Skin: no cyanosis or pallor; no rashes noted   Neuro: CN's 2-12 are grossly intact. No focal neurologic deficit appreciated. Cardiac work-up initiated. Last respiratory test was in 2019. Blood pressure initially low slightly improved with midodrine. Aspirin ordered for chest pain. Recommend overnight observation. EKG: EKG interpretation by ED physician: Normal axis, normal sinus rhythm at a rate of 75 bpm. No ischemic ST changes. FINAL IMPRESSION     1. Atypical chest pain    2. Dyspnea on exertion    3. Near syncope    4. Orthostatic hypotension    5.  End-stage renal disease on hemodialysis [facility])            Electronically signed by:   George Roy DO  10/26/20 [id number]

## 2020-10-28 VITALS
SYSTOLIC BLOOD PRESSURE: 136 MMHG | OXYGEN SATURATION: 96 % | RESPIRATION RATE: 18 BRPM | WEIGHT: 238.76 LBS | BODY MASS INDEX: 33.43 KG/M2 | DIASTOLIC BLOOD PRESSURE: 79 MMHG | HEIGHT: 71 IN | TEMPERATURE: 98 F | HEART RATE: 71 BPM

## 2020-10-28 PROBLEM — R07.89 ATYPICAL CHEST PAIN: Status: ACTIVE | Noted: 2020-10-26

## 2020-10-28 LAB
ALBUMIN SERPL-MCNC: 3.4 G/DL (ref 3.4–5)
ANION GAP SERPL CALCULATED.3IONS-SCNC: 12 MMOL/L (ref 3–16)
BUN BLDV-MCNC: 39 MG/DL (ref 7–20)
CALCIUM SERPL-MCNC: 9.6 MG/DL (ref 8.3–10.6)
CHLORIDE BLD-SCNC: 98 MMOL/L (ref 99–110)
CO2: 26 MMOL/L (ref 21–32)
CREAT SERPL-MCNC: 8.1 MG/DL (ref 0.8–1.3)
GFR AFRICAN AMERICAN: 8
GFR NON-AFRICAN AMERICAN: 7
GLUCOSE BLD-MCNC: 82 MG/DL (ref 70–99)
HCT VFR BLD CALC: 35.5 % (ref 40.5–52.5)
HEMOGLOBIN: 11.7 G/DL (ref 13.5–17.5)
LV EF: 55 %
LVEF MODALITY: NORMAL
MCH RBC QN AUTO: 30.3 PG (ref 26–34)
MCHC RBC AUTO-ENTMCNC: 32.9 G/DL (ref 31–36)
MCV RBC AUTO: 92 FL (ref 80–100)
PDW BLD-RTO: 15.7 % (ref 12.4–15.4)
PHOSPHORUS: 4.3 MG/DL (ref 2.5–4.9)
PLATELET # BLD: 250 K/UL (ref 135–450)
PMV BLD AUTO: 7.1 FL (ref 5–10.5)
POTASSIUM SERPL-SCNC: 4.6 MMOL/L (ref 3.5–5.1)
RBC # BLD: 3.85 M/UL (ref 4.2–5.9)
SODIUM BLD-SCNC: 136 MMOL/L (ref 136–145)
WBC # BLD: 7.1 K/UL (ref 4–11)

## 2020-10-28 PROCEDURE — 96376 TX/PRO/DX INJ SAME DRUG ADON: CPT

## 2020-10-28 PROCEDURE — 2500000003 HC RX 250 WO HCPCS: Performed by: FAMILY MEDICINE

## 2020-10-28 PROCEDURE — 2700000000 HC OXYGEN THERAPY PER DAY

## 2020-10-28 PROCEDURE — G0378 HOSPITAL OBSERVATION PER HR: HCPCS

## 2020-10-28 PROCEDURE — 80069 RENAL FUNCTION PANEL: CPT

## 2020-10-28 PROCEDURE — 94761 N-INVAS EAR/PLS OXIMETRY MLT: CPT

## 2020-10-28 PROCEDURE — 36415 COLL VENOUS BLD VENIPUNCTURE: CPT

## 2020-10-28 PROCEDURE — 6370000000 HC RX 637 (ALT 250 FOR IP): Performed by: INTERNAL MEDICINE

## 2020-10-28 PROCEDURE — 99214 OFFICE O/P EST MOD 30 MIN: CPT | Performed by: NURSE PRACTITIONER

## 2020-10-28 PROCEDURE — 6370000000 HC RX 637 (ALT 250 FOR IP): Performed by: STUDENT IN AN ORGANIZED HEALTH CARE EDUCATION/TRAINING PROGRAM

## 2020-10-28 PROCEDURE — 85027 COMPLETE CBC AUTOMATED: CPT

## 2020-10-28 PROCEDURE — 6360000002 HC RX W HCPCS: Performed by: STUDENT IN AN ORGANIZED HEALTH CARE EDUCATION/TRAINING PROGRAM

## 2020-10-28 PROCEDURE — 96372 THER/PROPH/DIAG INJ SC/IM: CPT

## 2020-10-28 PROCEDURE — 90935 HEMODIALYSIS ONE EVALUATION: CPT

## 2020-10-28 PROCEDURE — 2500000003 HC RX 250 WO HCPCS: Performed by: INTERNAL MEDICINE

## 2020-10-28 PROCEDURE — 93306 TTE W/DOPPLER COMPLETE: CPT

## 2020-10-28 PROCEDURE — 2580000003 HC RX 258: Performed by: STUDENT IN AN ORGANIZED HEALTH CARE EDUCATION/TRAINING PROGRAM

## 2020-10-28 RX ORDER — CHOLECALCIFEROL (VITAMIN D3) 10 MCG
1 TABLET ORAL DAILY
Status: DISCONTINUED | OUTPATIENT
Start: 2020-10-28 | End: 2020-10-28 | Stop reason: HOSPADM

## 2020-10-28 RX ORDER — HEPARIN SODIUM 1000 [USP'U]/ML
4400 INJECTION, SOLUTION INTRAVENOUS; SUBCUTANEOUS PRN
Status: DISCONTINUED | OUTPATIENT
Start: 2020-10-28 | End: 2020-10-28 | Stop reason: HOSPADM

## 2020-10-28 RX ORDER — LIDOCAINE HYDROCHLORIDE 10 MG/ML
0.4 INJECTION, SOLUTION EPIDURAL; INFILTRATION; INTRACAUDAL; PERINEURAL PRN
Status: DISCONTINUED | OUTPATIENT
Start: 2020-10-28 | End: 2020-10-28 | Stop reason: HOSPADM

## 2020-10-28 RX ADMIN — MIDODRINE HYDROCHLORIDE 10 MG: 5 TABLET ORAL at 14:39

## 2020-10-28 RX ADMIN — NEPHROCAP 1 MG: 1 CAP ORAL at 14:39

## 2020-10-28 RX ADMIN — HEPARIN SODIUM 5000 UNITS: 5000 INJECTION INTRAVENOUS; SUBCUTANEOUS at 05:07

## 2020-10-28 RX ADMIN — ASPIRIN 81 MG: 81 TABLET, CHEWABLE ORAL at 14:39

## 2020-10-28 RX ADMIN — Medication 10 ML: at 14:40

## 2020-10-28 RX ADMIN — AMIODARONE HYDROCHLORIDE 100 MG: 200 TABLET ORAL at 14:39

## 2020-10-28 RX ADMIN — SEVELAMER CARBONATE 3200 MG: 800 TABLET, FILM COATED ORAL at 14:49

## 2020-10-28 RX ADMIN — FAMOTIDINE 20 MG: 10 INJECTION INTRAVENOUS at 14:48

## 2020-10-28 RX ADMIN — MIDODRINE HYDROCHLORIDE 10 MG: 5 TABLET ORAL at 06:58

## 2020-10-28 RX ADMIN — HEPARIN SODIUM 5000 UNITS: 5000 INJECTION INTRAVENOUS; SUBCUTANEOUS at 14:40

## 2020-10-28 RX ADMIN — LIDOCAINE HYDROCHLORIDE 0.4 ML: 10 INJECTION, SOLUTION EPIDURAL; INFILTRATION; INTRACAUDAL; PERINEURAL at 13:28

## 2020-10-28 RX ADMIN — ACETAMINOPHEN 650 MG: 325 TABLET ORAL at 14:48

## 2020-10-28 ASSESSMENT — PAIN SCALES - GENERAL
PAINLEVEL_OUTOF10: 10
PAINLEVEL_OUTOF10: 2
PAINLEVEL_OUTOF10: 1

## 2020-10-28 ASSESSMENT — PAIN DESCRIPTION - PAIN TYPE
TYPE: ACUTE PAIN
TYPE: ACUTE PAIN

## 2020-10-28 ASSESSMENT — PAIN DESCRIPTION - ONSET: ONSET: GRADUAL

## 2020-10-28 ASSESSMENT — PAIN DESCRIPTION - LOCATION
LOCATION: HEAD
LOCATION: HEAD
LOCATION: CHEST

## 2020-10-28 ASSESSMENT — PAIN - FUNCTIONAL ASSESSMENT: PAIN_FUNCTIONAL_ASSESSMENT: ACTIVITIES ARE NOT PREVENTED

## 2020-10-28 NOTE — CONSULTS
[address] [address]                                  CONSULTATION    PATIENT NAME: Chapis Montes                     :        1960  MED REC NO:   [id number]                          ROOM:       [id number]  ACCOUNT NO:   [de-identified]                           ADMIT DATE: 10/26/2020  PROVIDER:     Khadar Mccormick MD    CONSULT DATE:  10/28/2020    REASON FOR CONSULTATION:  End-stage renal disease management. HISTORY OF PRESENT ILLNESS:  The patient is a 58-year-old  male  patient with a past medical history significant for end-stage renal  disease, on hemodialysis every Monday, Wednesday and Friday. The  patient presented to [facility] with a complaint of chest  pain and associated dizziness. The patient was admitted for further  evaluation in view of his relatively low blood pressure ranging between  80 to 90 mmHg systolic, and Nephrology were consulted for further  management of his dialysis needs. PAST MEDICAL HISTORY:  1. Atrial fibrillation. 2.  End-stage renal disease. 3.  Diastolic heart failure. 4.  Coronary artery disease. 5.  Hypertension. 6.  Impaired vision. PAST SURGICAL HISTORY:  1.  Bronchoscopy. 2.  Colonoscopy. 3.  AV fistula creation. 4.  Toe amputation. 5.  Thoracentesis. 6.  Tonsillectomy. ALLERGIES:  The patient is allergic to BEE POLLEN, CODEINE, and  ROSIGLITAZONE. SOCIAL HISTORY:  The patient quit smoking many years ago and does not  drink alcohol. FAMILY HISTORY:  Significant for diabetes mellitus in his father and  malignancy in his sister and mother. REVIEW OF SYSTEMS:  The patient denied any fevers, chills, cough, or  expectorations. Otherwise, a 10-point review of systems was relatively  unremarkable. PHYSICAL EXAMINATION:  VITAL SIGNS:  Blood pressure 125/67, heart rate 68, respirations 18,  temperature 97.5 Fahrenheit.   The patient is satting 93% on 2 L nasal  cannula. GENERAL APPEARANCE:  The patient is alert and oriented x3, not in acute  distress. The patient was seen and examined during dialysis. LUNGS:  Clear to anterior auscultation bilaterally, nonlabored  breathing. CARDIOVASCULAR EXAM:  Revealed S1 and S2.  Regular rate and rhythm. No  murmurs or rubs. No peripheral edema. ABDOMEN EXAM:  Revealed a soft abdomen. No organomegaly. SKIN:  Revealed no lesions or rashes. Warm to touch. PSYCHIATRIC:  Revealed good judgment and insight. LYMPHATICS:  Revealed no cervical or axillary adenopathies. ASSESSMENT AND PLAN:  1. End-stage renal disease. Hemodialysis in progress per Monday,  Wednesday, Friday schedule. 2.  No significant electrolyte disorders noted. 3.  Hypotension. Blood pressure improved, will apply parameters. 4.  Anemia, stable. Hemoglobin monitor. 5.  Chest pain, management per Cardiology.         Neda Magana MD    D: 10/28/2020 14:46:47       T: 10/28/2020 15:40:13     [email or web address]  Job#: [id number]     Doc#: [id number]    CC:

## 2020-10-28 NOTE — PROGRESS NOTES
Inpatient Family Medicine   Medical Student Progress Note    (This note is for educational purposes only)      PCP: Cynthia Carvajal    Date of Admission: 10/26/2020    Chief Complaint: Chest Pain and Dizziness    Hospital Course:  56 y. o. male with PMH significant for ESRD on HD, paroxysmal afib (not on anticoagulation due to bleeding from HD site, s/p watchman), DM (diet controlled), COPD, CHF, CAD, HTN, and impaired vision who presented to [facility] with dizziness and chest pain which began during [address] 10/26.  Pt stated that he uses midodrine during dialysis to help maintain his BP, when it gets low he sometimes feels dizzy.  Typically during dialysis about 4L of fluid is removed, he believes they took 4.5L today.    Received 2 doses of midodrine during dialysis with out any improvement of dizziness.  He also developed chest pain which was sharp on the left side of his chest, radiating into his left shoulder.  It lasted about 2 minutes, and has been coming and going since the episode first began during dialysis. [facility] notes that he has had it before, but it usually does not last this long.  He does admit to worsened SOB with activity, and states this has been going on for a couple of years.  His dizziness and chest pain had not resolved completely after going home from dialysis. After using the bathroom he became very dizzy and very SOB, so his step daughter called EMS. [facility] is normally on 2L of O2 at home, but had to increase to 4L prior to EMS arrival and was placed on 3L in the ED. [facility] denies any headache, fever, chills, N/V, abd pain, dysuria, diarrhea, and constipation.  Ambulates with a walker or cane at home.     In the ED, patient's blood pressure was 80-90s/50-60s. EKG was done with NSR. Troponin was 0.4mg. Was given midodrine 5mg for low BP and aspirin 243mg for chest pain and admitted to the floor for further observation and cardiac workup.     Since admission, patient reports no further chest pain, dizziness, and no difference in shortness of breath from baseline. Cardiology was consulted and decision was made to do chemical stress test and echo. Chemical stress test showed normal myocardial perfusion, normal LV function, size, and motion with EF of 77%    Subjective:   Patient lying comfortably in bed. Reports that he had an episode of chest pain this morning around [id number]. States the pain was stabbing and radiated to left shoulder and arm and then travelled to neck and right shoulder/arm. Was given two 81mg aspirin at the time and the pain resolved. Described that it felt more severe and lasted longer than the usual 15 minutes, but doesn't know exactly how long it lasted. No dizziness during this episode. Complaining of it being harder to breath while mask is on, but denies shortness of breath from baseline with mask off. Denies fevers, chills, diaphoresis, nausea, vomiting, abdominal pain, constipation, or diahrrea.      Medications:  Reviewed    Infusion Medications   Scheduled Medications    amiodarone  100 mg Oral Daily    citalopram  40 mg Oral Daily    sevelamer  3,200 mg Oral TID    traZODone  150 mg Oral Nightly    midodrine  10 mg Oral TID WC    sodium chloride flush  10 mL Intravenous 2 times per day    atorvastatin  80 mg Oral Nightly    aspirin  81 mg Oral Daily    heparin (porcine)  5,000 Units Subcutaneous 3 times per day    famotidine (PEPCID) injection  20 mg Intravenous Daily     PRN Meds: heparin (porcine), albuterol, ipratropium-albuterol, sodium chloride flush, acetaminophen **OR** acetaminophen, polyethylene glycol, promethazine **OR** ondansetron, perflutren lipid microspheres, nitroGLYCERIN      Intake/Output Summary (Last 24 hours) at 10/28/2020 0737  Last data filed at 10/28/2020 0500  Gross per 24 hour   Intake 120 ml   Output 0 ml   Net 120 ml       Physical Exam Performed:    /60   Pulse 66   Temp 97.6 °F (36.4 °C) (Oral)   Resp 16   Ht 5' 11\" (1.803 m)   Wt 242 lb 3.2 oz (109.9 kg)   SpO2 93%   BMI 33.78 kg/m²     General appearance: No apparent distress, appears older than stated age and cooperative. HEENT: Speaks with eyes closed due to visual impairment  Neck: No jugular venous distention. Trachea midline. Respiratory:  Normal respiratory effort. Clear to auscultation, bilaterally without Rales/Wheezes/Rhonchi. Cardiovascular: Regular rate and rhythm with normal S1/S2 without murmurs, rubs or gallops. AV fistula in right forearm with palpable thrill. Abdomen: Soft, non-tender, non-distended with normal bowel sounds. Musculoskeletal: No clubbing, cyanosis or edema bilaterally. Skin: Skin color, texture, turgor normal.  No rashes or lesions. Neurologic:  Neurovascularly intact without any focal sensory/motor deficits. grossly non-focal.  Psychiatric: Alert and oriented, thought content appropriate, normal insight  Peripheral Pulses: +2 palpable, equal bilaterally       Labs:   Recent Labs     10/26/20  1832 10/27/20  0621   WBC 8.8 7.4   HGB 12.8* 12.3*   HCT 39.1* 37.7*    243     Recent Labs     10/26/20  1832 10/27/20  0621   * 135*   K 4.5 4.6   CL 96* 99   CO2 29 26   BUN 18 26*   CREATININE 5.2* 6.2*   CALCIUM 9.9 9.7     Recent Labs     10/26/20  1832 10/27/20  0621   AST 22 12*   ALT 11 9*   BILITOT <0.2 <0.2   ALKPHOS 124 113     No results for input(s): INR in the last 72 hours.   Recent Labs     10/26/20  1832 10/27/20  0126   TROPONINI 0.04* 0.03*       Urinalysis:      Lab Results   Component Value Date    NITRU Negative 03/30/2015    WBCUA >100 03/30/2015    BACTERIA 4+ 09/16/2010    RBCUA >100 03/30/2015    BLOODU LARGE 03/30/2015    SPECGRAV 1.020 03/30/2015    GLUCOSEU 100 03/30/2015    GLUCOSEU 100 09/16/2010       Radiology:  NM Cardiac Stress Test Nuclear Imaging   Final Result      XR CHEST PORTABLE   Final Result   No significant change in appearance of the chest with persistent pleural   effusions and morning, but nothing to suggest that original assessment is invalid. Stress test showed normal perfusion and LV function, motion, and size. Discharge to home today pending results of echo. This patient was seen and evaluated with the resident team and attending, Dr. Cary Fierro. Dora Dorantes, S-III  A. .  [address] [place] [address] Osteopathic Medicine in [place]

## 2020-10-28 NOTE — PROGRESS NOTES
Pt ok for discharge per MD. Discharge instructions and script given. IV removed. Telebox removed and returned. No questions or concerns at this time. Transported by wheelchair to transportation.

## 2020-10-28 NOTE — CONSULTS
Patient seen and examined during dialysis, consult note dictated. Assessment and Plan:    1- ESRD: Hemodialysis in progress per MWF schedule. 2- No significant electrolytes disorders noted. 3- Hypotension: Blood pressure improved, will apply parameters. 4- Anemia: Stable hemoglobin, monitor. 5- Chest pain: Management per Cardiology.

## 2020-10-28 NOTE — PROGRESS NOTES
Secure message sent to cross cover NP reguarding prolonged 7/10 chest pain radiating to neck and left shoulder, message read at 2:23am. Awaiting response at this time.

## 2020-10-28 NOTE — CARE COORDINATION
CASE MANAGEMENT DISCHARGE SUMMARY    Discharge to: Home    Transportation:    [address] up time: 7pm   Ambulance form completed: Yes    Confirmed discharge plan with:     Patient: yes  Patient will dc home this date via [facility]. Letitia Palomo call to resume care on Friday 10/30/2020. Faxed copy of run sheet. [address] to provide transport to treatments. Denies other needs.  CATHERINE Pemberton

## 2020-10-28 NOTE — PROGRESS NOTES
and rhythm with normal S1/S2 without murmurs, rubs or gallops. Abdomen: Soft, non-tender, non-distended with normal bowel sounds. Musculoskeletal: No clubbing, cyanosis or edema bilaterally. Full range of motion without deformity. Skin: Skin color, texture, turgor normal.  No rashes or lesions. Fistula noted in the right forearm with palpable thrill. Neurologic:  Neurovascularly intact without any focal sensory/motor deficits. Grossly non-focal.  Psychiatric: Alert and oriented, thought content appropriate, normal insight  Extremities: No pitting edema      Labs:   Recent Labs     10/26/20  1832 10/27/20  0621 10/28/20  0729   WBC 8.8 7.4 7.1   HGB 12.8* 12.3* 11.7*   HCT 39.1* 37.7* 35.5*    243 250     Recent Labs     10/26/20  1832 10/27/20  0621 10/28/20  0729   * 135* 136   K 4.5 4.6 4.6   CL 96* 99 98*   CO2 29 26 26   BUN 18 26* 39*   CREATININE 5.2* 6.2* 8.1*   CALCIUM 9.9 9.7 9.6   PHOS  --   --  4.3     Recent Labs     10/26/20  1832 10/27/20  0621   AST 22 12*   ALT 11 9*   BILITOT <0.2 <0.2   ALKPHOS 124 113     No results for input(s): INR in the last 72 hours. Recent Labs     10/26/20  1832 10/27/20  0126   TROPONINI 0.04* 0.03*       Urinalysis:      Lab Results   Component Value Date    NITRU Negative 03/30/2015    WBCUA >100 03/30/2015    BACTERIA 4+ 09/16/2010    RBCUA >100 03/30/2015    BLOODU LARGE 03/30/2015    SPECGRAV 1.020 03/30/2015    GLUCOSEU 100 03/30/2015    GLUCOSEU 100 09/16/2010       Radiology:  NM Cardiac Stress Test Nuclear Imaging   Final Result      XR CHEST PORTABLE   Final Result   No significant change in appearance of the chest with persistent pleural   effusions and bibasilar opacities.                  Assessment/Plan:    Active Hospital Problems    Diagnosis Date Noted    Chest pain [R07.9] 10/26/2020    Dizziness [R42]     Hypotension [I95.9] 08/01/2020    [facility] [N18.6, Z99.2] 03/27/2015       Atypical Chest Pain  -Last stress test and Echo in January 2019 which was normal perfusion with normal LV function and wall motion. Echo 1/2019: LVEF 55%, Watchman device in place with no leak. -Normal EKG in ED with elevated troponin of 0.04 which is patient's baseline. Repeat troponin overnight 0.03.    -Pro-BNP 4,064 on admission  -Echo ordered  -Stress test negative on 10/27  -Cardiology following   -Aspirin, Statin, Heparin  -Currently on home level 2L of O2     Hypotension  -Improved   -Most recent BP: 111/60 at 0500  -BP range x24 hours: 103-139/60-82  -Continue home midodrine 10mg TID  -Monitor BP     Dizziness   -Resolved  -Likely 2/2 to hypotension  -s/p Bolus of 250 cc of NS  -s/p Midodrine 5mg at [address] on 10/27  -Continue home midodrine 10mg TID    End-stage renal disease on dialysis  -Patient currently receives outpatient dialysis on Monday, Wednesday, and Friday  -Cr on admission 5.2 --> 6.2 --> 8.1 today (lowest in the last year was 3.9 back in August 2019. Typically Cr in the 5-7 range)  -Receiving dialysis this AM    Chronic SOB  -Stable  -Hx of COPD and CHF  -Currently on baseline O2 of 2L   -May use nebulizer as needed     T2DM  -Currently diet controlled  -HgbA1c ordered, was 5.5 in April 2020      DVT Prophylaxis: Heparin    Diet: DIET CARDIAC; No Caffeine     Code Status: Full Code     PT/OT Eval Status: Not consulted. Patient currently lives in a mobile home with his stepdaughter. Feels safe at home. Ambulates with the assistance of a walker/cane. Dispo - Improving. Patient with episode of chest pain last night but had a normal stress test yesterday. Echocardiogram today. Currently in dialysis. Anticipate discharge to home later today. This patient was seen and evaluated with the resident team and attending, Dr. Kp Black. Liam Carvajal D.O.   [facility] Resident  PGY-3  (available by [facility])

## 2020-10-28 NOTE — PLAN OF CARE
Problem: Metabolic:  Goal: Ability to maintain appropriate glucose levels will improve  Description: Ability to maintain appropriate glucose levels will improve  Outcome: Ongoing   The patient will demonstrate an understanding of a diabetic diet with the goal of completion at discharge.

## 2020-10-28 NOTE — DISCHARGE SUMMARY
Inpatient Family Medicine Service   Discharge Summary      Patient ID: Mikala Cyr    Patient's PCP: Hill Rojas Date: 10/26/2020   Discharge Date:  10/28/2020    Admitting Physician: Rian Nelson DO  Discharge Physician: Sandra Barakat DO     Discharge Diagnoses: Active Hospital Problems    Diagnosis Date Noted    Atypical chest pain [R07.89] 10/26/2020    Dizziness [R42]     Hypotension [I95.9] 08/01/2020    End-stage renal disease on hemodialysis ([facility] [address] [N18.6, Z99.2] 03/27/2015       The patient was seen and examined on day of discharge and this discharge summary is in conjunction with any daily progress note from day of discharge. Hospital Course:  56 y. o. male with PMH significant for ESRD on HD, paroxysmal afib (not on anticoagulation due to bleeding from HD site, s/p watchman), DM (diet controlled), COPD, CHF, CAD, HTN, and impaired vision who presented to [facility] with dizziness and chest pain which began during dialysis on 10/26.  Pt stated that he uses midodrine during dialysis to help maintain his BP, when it gets low he sometimes feels dizzy. Typically during dialysis about 4L of fluid is removed, he believes they took 4.5L today.    Received 2 doses of midodrine during dialysis with out any improvement of dizziness. He also developed chest pain which was sharp on the left side of his chest, radiating into his left shoulder.  It lasted about 2 minutes, and has been coming and going since the episode first began during dialysis. [facility] notes that he has had it before, but it usually does not last this long.  He does admit to worsened SOB with activity, and states this has been going on for a couple of years.  His dizziness and chest pain had not resolved completely after going home from dialysis.  After using the bathroom he became very dizzy and very SOB, so his step daughter called EMS. [facility] is normally on 2L of O2 at home, but had to increase to 4L prior to EMS arrival and was placed on 3L in the ED. He denies any headache, fever, chills, N/V, abd pain, dysuria, diarrhea, and constipation.  Ambulates with a walker or cane at home.     In the ED, patient was noted to have some hypotension with BP 80-90s/50-60s. Troponin was 0.04 which is patient's baseline. EKG was normal sinus rhythm. Patient was given midodrine 5 mg as well as aspirin 243 mg.      In the hospital, patient was doing well. Reports that his dizziness and chest pain had resolved, with improved blood pressure. He was stable on his home oxygen level of 2 L. Patient underwent stress test on 10/27 which was normal with normal left ventricular function, size, and wall motion. The morning of 10/28, patient reported reoccurrence of chest pain without dizziness or shortness of breath which resolved with aspirin. Underwent normally scheduled dialysis on 10/28. Echo on 10/28 showed normal left ventricle size, thickness and function with EF of 55%. Patient was stable on tolerating a cardiac diet on date of discharge. Physical Exam Performed:     /79   Pulse 71   Temp 98 °F (36.7 °C) (Oral)   Resp 18   Ht 5' 11\" (1.803 m)   Wt 238 lb 12.1 oz (108.3 kg)   SpO2 96%   BMI 33.30 kg/m²       General appearance: No apparent distress, appears stated age and cooperative. HEENT: Conjunctivae/corneas clear. Neck: Supple, with full range of motion. No jugular venous distention. Trachea midline. Respiratory:  Normal respiratory effort. Clear to auscultation, bilaterally without Rales/Wheezes/Rhonchi. Cardiovascular: Regular rate and rhythm with normal S1/S2 without murmurs, rubs or gallops. Abdomen: Soft, non-tender, non-distended with normal bowel sounds. Musculoskeletal: No clubbing, cyanosis or edema bilaterally. Full range of motion without deformity. Skin: Skin color, texture, turgor normal.  No rashes or lesions. Fistula noted in the right forearm with palpable thrill.   Neurologic: Neurovascularly intact without any focal sensory/motor deficits. Grossly non-focal.  Psychiatric: Alert and oriented, thought content appropriate, normal insight  Extremities: No pitting edema      Labs: For convenience and continuity at follow-up the following most recent labs are provided:    CBC:    Lab Results   Component Value Date    WBC 7.1 10/28/2020    HGB 11.7 10/28/2020    HCT 35.5 10/28/2020     10/28/2020       Renal:    Lab Results   Component Value Date     10/28/2020    K 4.6 10/28/2020    K 4.6 10/27/2020    CL 98 10/28/2020    CO2 26 10/28/2020    BUN 39 10/28/2020    CREATININE 8.1 10/28/2020    CALCIUM 9.6 10/28/2020    PHOS 4.3 10/28/2020       Significant Diagnostic Studies    Radiology:   NM Cardiac Stress Test Nuclear Imaging   Final Result      XR CHEST PORTABLE   Final Result   No significant change in appearance of the chest with persistent pleural   effusions and bibasilar opacities. Consults:     IP CONSULT TO HOSPITALIST  IP CONSULT TO CARDIOLOGY    Disposition:  Patient is stable with normal chemical stress test and echo. Discharge to home. Condition at Discharge: Stable    Discharge Instructions/Follow-up:   -Continue midodrine 10mg three times daily  -Follow-up with Lizbeth Xiong (PCP) in one week.      Code Status:  Full Code    Activity: activity as tolerated    Diet: diabetic diet      Discharge Medications:     Current Discharge Medication List           Details   butalbital-acetaminophen-caffeine (FIORICET, ESGIC) -40 MG per tablet Take 1 tablet by mouth every 4 hours as needed for Headaches  Qty: 30 tablet, Refills: 0      midodrine (PROAMATINE) 10 MG tablet Take 1 tablet by mouth 3 times daily (with meals)  Qty: 90 tablet, Refills: 3      albuterol sulfate HFA (PROVENTIL HFA) 108 (90 Base) MCG/ACT inhaler Inhale 2 puffs into the lungs every 6 hours as needed for Wheezing  Qty: 1 Inhaler, Refills: 0      Chlorpheniramine-DM 4-30 MG TABS Take 1 tablet by mouth 3 times daily as needed (CONGESTION)  Qty: 16 tablet, Refills: 0      albuterol (PROVENTIL) (2.5 MG/3ML) 0.083% nebulizer solution Take 3 mLs by nebulization every 4 hours as needed for Wheezing or Shortness of Breath  Qty: 25 each, Refills: 0      aspirin EC 81 MG EC tablet Take 2 tablets by mouth daily  Qty: 90 tablet, Refills: 3      atorvastatin (LIPITOR) 40 MG tablet Take 1 tablet by mouth daily  Qty: 30 tablet, Refills: 1      ipratropium-albuterol (DUONEB) 0.5-2.5 (3) MG/3ML SOLN nebulizer solution Inhale 3 mLs into the lungs every 6 hours as needed for Shortness of Breath (dyspnea or wheezes. ) DX PULM HTN I27.20  Qty: 360 mL, Refills: 6      B Complex-C-Folic Acid (TRIPHROCAPS PO) Take by mouth      docusate sodium (COLACE, DULCOLAX) 100 MG CAPS Take 100 mg by mouth 2 times daily      citalopram (CELEXA) 40 MG tablet Take 1 tablet by mouth daily  Qty: 30 tablet, Refills: 0      OXYGEN Inhale 2 L into the lungs continuous With concentrator and portability  Qty: 1 each, Refills: 0      traZODone (DESYREL) 150 MG tablet Take 150 mg by mouth nightly. sevelamer (RENVELA) 800 MG tablet Take 4 tablets by mouth 3 times daily       amiodarone (PACERONE) 100 MG tablet TAKE ONE (1) TABLET BY MOUTH DAILY   Qty: 30 tablet, Refills: 2      Respiratory Therapy Supplies (NEBULIZER COMPRESSOR) KIT 1 kit by Does not apply route once for 1 dose  Qty: 1 kit, Refills: 0             Time Spent on discharge is more than 30 minutes in the examination, evaluation, counseling and review of medications and discharge plan. This patient was seen and evaluated with attending, Dr. Trent Paige. Signed:    Lang Mckeon DO   10/28/2020      Thank you Sherryle Boas for the opportunity to be involved in this patient's care. If you have any questions or concerns please feel free to contact me at [phone].

## 2020-10-28 NOTE — PROGRESS NOTES
[facility]   Daily Progress Note    Admit Date:  10/26/2020  HPI:    Chief Complaint   Patient presents with    Shortness of Breath     SOB and CP after dialysis today,     Chest Pain      History of paroxysmal atrial fibrillation, atrial flutter, HTN, chronic diastolic CHF, pulmonary hypertension, DM, chronic resp failure, ESRD, vision loss, and anemia. Stress test in 2/2016 was negative for reversible ischemia. Echo in 2/2016 showed an EF of 55%. 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 He was started on amiodarone at that time. His amiodarone was decreased to 100mg per day due to bradycardia. He was readmitted in 9/2018 with CHF and atrial flutter. Stress test in 1/2019 was negative. SWETHA in 1/2019 showed Watchman was in good position and Plavix was stopped. Subjective:  Mr. Jason Coyle denies any further chest pain. Complains of headache after dialysis today. Awaiting echo. Objective:   /60   Pulse 66   Temp 97.6 °F (36.4 °C) (Oral)   Resp 16   Ht 5' 11\" (1.803 m)   Wt 242 lb 3.2 oz (109.9 kg)   SpO2 93%   BMI 33.78 kg/m²       Intake/Output Summary (Last 24 hours) at 10/28/2020 1112  Last data filed at 10/28/2020 0500  Gross per 24 hour   Intake 120 ml   Output 0 ml   Net 120 ml     Wt Readings from Last 3 Encounters:   10/28/20 242 lb 3.2 oz (109.9 kg)   08/05/20 242 lb 8.1 oz (110 kg)   05/11/20 231 lb (104.8 kg)         ASSESSMENT:   1. A. fib/atrial flutter-on amiodarone 100 mg daily, status post watchman device no longer on anticoagulation  2. Chest pain/elevated troponin-no rise and fall, not consistent with ACS, stress test without ischemia  3. Hypertension-hypotensive on midodrine particularly with dialysis  4. Chronic diastolic CHF-volume management per hemodialysis, appears euvolemic on exam  5. ESRD on HD  6. Chronic respiratory failure  7. Anemia of chronic disease  8.  Diabetes mellitus      PLAN:  1. Echocardiogram today  2. Continue aspirin and statin  3. Continue continue amiodarone per EP  4. Consider low-dose metoprolol as antianginal and for arrhythmia  5. If echocardiogram unremarkable then would be okay for discharge from a cardiac perspective. 6. He was to follow-up with Ankur Layton in 6 months from May appointment. We will have our office arrange for that. Martha Mckeon, APRN - CNP, 10/28/2020, 11:12 AM  [facility]   [phone]       Telemetry: Sinus rhythm, PVCs, rate 50 to 70s, 8 7 consider atrial tach at 05:50 this a.m. NYHA: III    Physical Exam:  General:  Awake, alert, NAD, blind  Skin:  Warm and dry  Neck:  JVP unable to assess  Chest: Clear to auscultation anteriorly  Cardiovascular:  RRR, normal S1S2, no appreciable murmur gallop rub or click  Abdomen:  Soft, nontender, +bowel sounds  Extremities: No BLE edema        Medications:    amiodarone  100 mg Oral Daily    citalopram  40 mg Oral Daily    sevelamer  3,200 mg Oral TID    traZODone  150 mg Oral Nightly    midodrine  10 mg Oral TID WC    sodium chloride flush  10 mL Intravenous 2 times per day    atorvastatin  80 mg Oral Nightly    aspirin  81 mg Oral Daily    heparin (porcine)  5,000 Units Subcutaneous 3 times per day    famotidine (PEPCID) injection  20 mg Intravenous Daily         Lab Data: Lab results independently reviewed by myself 10/28/20   CBC:   Recent Labs     10/26/20  1832 10/27/20  0621 10/28/20  0729   WBC 8.8 7.4 7.1   HGB 12.8* 12.3* 11.7*    243 250     BMP:    Recent Labs     10/26/20  1832 10/27/20  0621 10/28/20  0729   * 135* 136   K 4.5 4.6 4.6   CO2 29 26 26   BUN 18 26* 39*   CREATININE 5.2* 6.2* 8.1*     INR:  No results for input(s): INR in the last 72 hours.   BNP:    Recent Labs     10/27/20  0126   PROBNP 4,064*     Cardiac Enzymes:   Recent Labs     10/26/20  1832 10/27/20  0126   TROPONINI 0.04* 0.03*     Lipids:   Lab Results Component Value Date    TRIG 83 10/27/2020    TRIG 173 10/03/2012    HDL 42 10/27/2020    HDL 38 10/03/2012    HDL 34 09/17/2010    HDL 43 06/14/2010    LDLCALC 50 10/27/2020    LDLCALC 134 10/03/2012       Cardiac Imaging:   STRESS MPI (LEXISCAN) 10/27/20:     Summary  Normal myocardial perfusion study with normal left ventricular function,  size, and wall motion. The estimated left ventricular function is 73%. SWETHA 1/7/2019:  Left ventricular systolic function is normal with an estimated ejection fraction of 55%. Mild mitral regurgitation. Mild tricuspid regurgitation. Watchman device was well seated in the left atrial appendage with no flow through the device and no leak. Stress test 1/4/2019:   Normal myocardial perfusion with normal left ventricular function and wall    motion. The left ventricular size is mildly dilated. The estimated left ventricular function is 62%. SWETHA 4/13/2018:  Ejection fraction is visually estimated to be 45-50 %. Mild mitral regurgitation. Moderately dilated left atrium. Watchman device in place. No leak around the watchman device   Moderate-to-severe tricuspid regurgitation. The right and left atriums are moderately dilated. Stress test 2/17/2016:  IMPRESSION:   1. No pharmacologically induced reversible perfusion defects to suggest   ischemia.   Stable fixed perfusion defect within the inferior wall either   represents old infarct or diaphragmatic attenuation -unchanged from 2012.   2. Ejection fraction of 55%

## 2020-10-29 ENCOUNTER — TELEPHONE (OUTPATIENT)
Dept: CARDIOLOGY CLINIC | Age: 60
End: 2020-10-29

## 2020-10-29 NOTE — TELEPHONE ENCOUNTER
10/29 - Called and LMOVM on [phone] ( home # is not in service).  Appt scheduled incorrectly, needs to be changed to NPBB

## 2020-10-29 NOTE — TELEPHONE ENCOUNTER
I left a message for the patient to return the call to change his appointment. He needs to see NPBB not NPDD. I scheduled it incorrectly yesterday. Needs appt in November or December with NPBB. Thank You.

## 2020-11-02 RX ORDER — AMIODARONE HYDROCHLORIDE 100 MG/1
TABLET ORAL
Qty: 30 TABLET | Refills: 2 | Status: SHIPPED | OUTPATIENT
Start: 2020-11-02

## 2020-11-02 NOTE — TELEPHONE ENCOUNTER
Pt requesting amiodarone 100 mg tab. Pending script sent to [id number]. Last OV 5/11/2020 with NP BB  Plan:   1.  Continue amiodarone and ASA   Last Labs  10/28/2020  Next OV 12/4/2020 with NP DD

## 2020-11-15 ENCOUNTER — APPOINTMENT (OUTPATIENT)
Dept: GENERAL RADIOLOGY | Age: 60
End: 2020-11-15
Payer: COMMERCIAL

## 2020-11-15 ENCOUNTER — HOSPITAL ENCOUNTER (OUTPATIENT)
Age: 60
Setting detail: OBSERVATION
Discharge: HOME HEALTH | End: 2020-11-16
Attending: EMERGENCY MEDICINE | Admitting: FAMILY MEDICINE
Payer: COMMERCIAL

## 2020-11-15 PROBLEM — R06.02 SHORTNESS OF BREATH: Status: ACTIVE | Noted: 2020-11-15

## 2020-11-15 LAB
A/G RATIO: 1.1 (ref 1.1–2.2)
A/G RATIO: 1.4 (ref 1.1–2.2)
ALBUMIN SERPL-MCNC: 3.5 G/DL (ref 3.4–5)
ALBUMIN SERPL-MCNC: 4 G/DL (ref 3.4–5)
ALP BLD-CCNC: 110 U/L (ref 40–129)
ALP BLD-CCNC: 96 U/L (ref 40–129)
ALT SERPL-CCNC: 10 U/L (ref 10–40)
ALT SERPL-CCNC: 8 U/L (ref 10–40)
ANION GAP SERPL CALCULATED.3IONS-SCNC: 11 MMOL/L (ref 3–16)
ANION GAP SERPL CALCULATED.3IONS-SCNC: 9 MMOL/L (ref 3–16)
AST SERPL-CCNC: 14 U/L (ref 15–37)
AST SERPL-CCNC: 23 U/L (ref 15–37)
BASOPHILS ABSOLUTE: 0.1 K/UL (ref 0–0.2)
BASOPHILS ABSOLUTE: 0.1 K/UL (ref 0–0.2)
BASOPHILS RELATIVE PERCENT: 1 %
BASOPHILS RELATIVE PERCENT: 1 %
BILIRUB SERPL-MCNC: <0.2 MG/DL (ref 0–1)
BILIRUB SERPL-MCNC: <0.2 MG/DL (ref 0–1)
BUN BLDV-MCNC: 37 MG/DL (ref 7–20)
BUN BLDV-MCNC: 40 MG/DL (ref 7–20)
CALCIUM SERPL-MCNC: 9.5 MG/DL (ref 8.3–10.6)
CALCIUM SERPL-MCNC: 9.9 MG/DL (ref 8.3–10.6)
CHLORIDE BLD-SCNC: 92 MMOL/L (ref 99–110)
CHLORIDE BLD-SCNC: 97 MMOL/L (ref 99–110)
CO2: 26 MMOL/L (ref 21–32)
CO2: 28 MMOL/L (ref 21–32)
CREAT SERPL-MCNC: 6.4 MG/DL (ref 0.8–1.3)
CREAT SERPL-MCNC: 6.9 MG/DL (ref 0.8–1.3)
EKG ATRIAL RATE: 63 BPM
EKG ATRIAL RATE: 64 BPM
EKG DIAGNOSIS: NORMAL
EKG DIAGNOSIS: NORMAL
EKG P AXIS: 12 DEGREES
EKG P AXIS: 28 DEGREES
EKG P-R INTERVAL: 146 MS
EKG P-R INTERVAL: 160 MS
EKG Q-T INTERVAL: 430 MS
EKG Q-T INTERVAL: 446 MS
EKG QRS DURATION: 80 MS
EKG QRS DURATION: 84 MS
EKG QTC CALCULATION (BAZETT): 443 MS
EKG QTC CALCULATION (BAZETT): 456 MS
EKG R AXIS: 17 DEGREES
EKG R AXIS: 18 DEGREES
EKG T AXIS: 23 DEGREES
EKG T AXIS: 24 DEGREES
EKG VENTRICULAR RATE: 63 BPM
EKG VENTRICULAR RATE: 64 BPM
EOSINOPHILS ABSOLUTE: 0.6 K/UL (ref 0–0.6)
EOSINOPHILS ABSOLUTE: 0.7 K/UL (ref 0–0.6)
EOSINOPHILS RELATIVE PERCENT: 8.5 %
EOSINOPHILS RELATIVE PERCENT: 9 %
GFR AFRICAN AMERICAN: 10
GFR AFRICAN AMERICAN: 11
GFR NON-AFRICAN AMERICAN: 8
GFR NON-AFRICAN AMERICAN: 9
GLOBULIN: 2.9 G/DL
GLOBULIN: 3.1 G/DL
GLUCOSE BLD-MCNC: 103 MG/DL (ref 70–99)
GLUCOSE BLD-MCNC: 115 MG/DL (ref 70–99)
HCT VFR BLD CALC: 35.1 % (ref 40.5–52.5)
HCT VFR BLD CALC: 37.3 % (ref 40.5–52.5)
HEMOGLOBIN: 11.5 G/DL (ref 13.5–17.5)
HEMOGLOBIN: 12.1 G/DL (ref 13.5–17.5)
LYMPHOCYTES ABSOLUTE: 1.1 K/UL (ref 1–5.1)
LYMPHOCYTES ABSOLUTE: 1.2 K/UL (ref 1–5.1)
LYMPHOCYTES RELATIVE PERCENT: 14.4 %
LYMPHOCYTES RELATIVE PERCENT: 15.7 %
MCH RBC QN AUTO: 30.2 PG (ref 26–34)
MCH RBC QN AUTO: 30.6 PG (ref 26–34)
MCHC RBC AUTO-ENTMCNC: 32.4 G/DL (ref 31–36)
MCHC RBC AUTO-ENTMCNC: 32.6 G/DL (ref 31–36)
MCV RBC AUTO: 93.3 FL (ref 80–100)
MCV RBC AUTO: 93.7 FL (ref 80–100)
MONOCYTES ABSOLUTE: 0.7 K/UL (ref 0–1.3)
MONOCYTES ABSOLUTE: 0.8 K/UL (ref 0–1.3)
MONOCYTES RELATIVE PERCENT: 9.4 %
MONOCYTES RELATIVE PERCENT: 9.5 %
NEUTROPHILS ABSOLUTE: 4.4 K/UL (ref 1.7–7.7)
NEUTROPHILS ABSOLUTE: 5.6 K/UL (ref 1.7–7.7)
NEUTROPHILS RELATIVE PERCENT: 64.8 %
NEUTROPHILS RELATIVE PERCENT: 66.7 %
PDW BLD-RTO: 15.6 % (ref 12.4–15.4)
PDW BLD-RTO: 15.8 % (ref 12.4–15.4)
PLATELET # BLD: 201 K/UL (ref 135–450)
PLATELET # BLD: 232 K/UL (ref 135–450)
PMV BLD AUTO: 7.3 FL (ref 5–10.5)
PMV BLD AUTO: 7.4 FL (ref 5–10.5)
POTASSIUM REFLEX MAGNESIUM: 4.4 MMOL/L (ref 3.5–5.1)
POTASSIUM REFLEX MAGNESIUM: 4.5 MMOL/L (ref 3.5–5.1)
PRO-BNP: 6521 PG/ML (ref 0–124)
RBC # BLD: 3.75 M/UL (ref 4.2–5.9)
RBC # BLD: 4 M/UL (ref 4.2–5.9)
SODIUM BLD-SCNC: 129 MMOL/L (ref 136–145)
SODIUM BLD-SCNC: 134 MMOL/L (ref 136–145)
TOTAL PROTEIN: 6.6 G/DL (ref 6.4–8.2)
TOTAL PROTEIN: 6.9 G/DL (ref 6.4–8.2)
TROPONIN: 0.02 NG/ML
WBC # BLD: 6.8 K/UL (ref 4–11)
WBC # BLD: 8.4 K/UL (ref 4–11)

## 2020-11-15 PROCEDURE — 6370000000 HC RX 637 (ALT 250 FOR IP): Performed by: STUDENT IN AN ORGANIZED HEALTH CARE EDUCATION/TRAINING PROGRAM

## 2020-11-15 PROCEDURE — G0378 HOSPITAL OBSERVATION PER HR: HCPCS

## 2020-11-15 PROCEDURE — 96374 THER/PROPH/DIAG INJ IV PUSH: CPT

## 2020-11-15 PROCEDURE — 84484 ASSAY OF TROPONIN QUANT: CPT

## 2020-11-15 PROCEDURE — 85025 COMPLETE CBC W/AUTO DIFF WBC: CPT

## 2020-11-15 PROCEDURE — 2700000000 HC OXYGEN THERAPY PER DAY

## 2020-11-15 PROCEDURE — 71045 X-RAY EXAM CHEST 1 VIEW: CPT

## 2020-11-15 PROCEDURE — 99285 EMERGENCY DEPT VISIT HI MDM: CPT

## 2020-11-15 PROCEDURE — 83880 ASSAY OF NATRIURETIC PEPTIDE: CPT

## 2020-11-15 PROCEDURE — 6360000002 HC RX W HCPCS: Performed by: STUDENT IN AN ORGANIZED HEALTH CARE EDUCATION/TRAINING PROGRAM

## 2020-11-15 PROCEDURE — 80053 COMPREHEN METABOLIC PANEL: CPT

## 2020-11-15 PROCEDURE — 2580000003 HC RX 258: Performed by: STUDENT IN AN ORGANIZED HEALTH CARE EDUCATION/TRAINING PROGRAM

## 2020-11-15 PROCEDURE — 96372 THER/PROPH/DIAG INJ SC/IM: CPT

## 2020-11-15 PROCEDURE — 94761 N-INVAS EAR/PLS OXIMETRY MLT: CPT

## 2020-11-15 PROCEDURE — 90935 HEMODIALYSIS ONE EVALUATION: CPT

## 2020-11-15 PROCEDURE — 93005 ELECTROCARDIOGRAM TRACING: CPT | Performed by: STUDENT IN AN ORGANIZED HEALTH CARE EDUCATION/TRAINING PROGRAM

## 2020-11-15 PROCEDURE — 93010 ELECTROCARDIOGRAM REPORT: CPT | Performed by: INTERNAL MEDICINE

## 2020-11-15 PROCEDURE — 93005 ELECTROCARDIOGRAM TRACING: CPT | Performed by: EMERGENCY MEDICINE

## 2020-11-15 PROCEDURE — 36415 COLL VENOUS BLD VENIPUNCTURE: CPT

## 2020-11-15 RX ORDER — ACETAMINOPHEN 325 MG/1
650 TABLET ORAL EVERY 6 HOURS PRN
Status: DISCONTINUED | OUTPATIENT
Start: 2020-11-15 | End: 2020-11-16 | Stop reason: HOSPADM

## 2020-11-15 RX ORDER — AMIODARONE HYDROCHLORIDE 200 MG/1
100 TABLET ORAL DAILY
Status: DISCONTINUED | OUTPATIENT
Start: 2020-11-15 | End: 2020-11-16 | Stop reason: HOSPADM

## 2020-11-15 RX ORDER — TRAZODONE HYDROCHLORIDE 50 MG/1
150 TABLET ORAL NIGHTLY
Status: DISCONTINUED | OUTPATIENT
Start: 2020-11-15 | End: 2020-11-16 | Stop reason: HOSPADM

## 2020-11-15 RX ORDER — CITALOPRAM 20 MG/1
40 TABLET ORAL DAILY
Status: DISCONTINUED | OUTPATIENT
Start: 2020-11-15 | End: 2020-11-16 | Stop reason: HOSPADM

## 2020-11-15 RX ORDER — DOCUSATE SODIUM 100 MG/1
100 CAPSULE, LIQUID FILLED ORAL 2 TIMES DAILY
Status: DISCONTINUED | OUTPATIENT
Start: 2020-11-15 | End: 2020-11-16 | Stop reason: HOSPADM

## 2020-11-15 RX ORDER — ASPIRIN 81 MG/1
162 TABLET ORAL DAILY
Status: DISCONTINUED | OUTPATIENT
Start: 2020-11-15 | End: 2020-11-16 | Stop reason: HOSPADM

## 2020-11-15 RX ORDER — PROMETHAZINE HYDROCHLORIDE 25 MG/1
12.5 TABLET ORAL EVERY 6 HOURS PRN
Status: DISCONTINUED | OUTPATIENT
Start: 2020-11-15 | End: 2020-11-16 | Stop reason: HOSPADM

## 2020-11-15 RX ORDER — ONDANSETRON 2 MG/ML
4 INJECTION INTRAMUSCULAR; INTRAVENOUS EVERY 6 HOURS PRN
Status: DISCONTINUED | OUTPATIENT
Start: 2020-11-15 | End: 2020-11-16 | Stop reason: HOSPADM

## 2020-11-15 RX ORDER — SODIUM CHLORIDE 0.9 % (FLUSH) 0.9 %
10 SYRINGE (ML) INJECTION PRN
Status: DISCONTINUED | OUTPATIENT
Start: 2020-11-15 | End: 2020-11-16 | Stop reason: HOSPADM

## 2020-11-15 RX ORDER — HEPARIN SODIUM 5000 [USP'U]/ML
5000 INJECTION, SOLUTION INTRAVENOUS; SUBCUTANEOUS EVERY 8 HOURS SCHEDULED
Status: DISCONTINUED | OUTPATIENT
Start: 2020-11-15 | End: 2020-11-16 | Stop reason: HOSPADM

## 2020-11-15 RX ORDER — MIDODRINE HYDROCHLORIDE 5 MG/1
10 TABLET ORAL
Status: DISCONTINUED | OUTPATIENT
Start: 2020-11-15 | End: 2020-11-16 | Stop reason: HOSPADM

## 2020-11-15 RX ORDER — SODIUM CHLORIDE 0.9 % (FLUSH) 0.9 %
10 SYRINGE (ML) INJECTION EVERY 12 HOURS SCHEDULED
Status: DISCONTINUED | OUTPATIENT
Start: 2020-11-15 | End: 2020-11-16 | Stop reason: HOSPADM

## 2020-11-15 RX ORDER — ATORVASTATIN CALCIUM 40 MG/1
40 TABLET, FILM COATED ORAL DAILY
Status: DISCONTINUED | OUTPATIENT
Start: 2020-11-15 | End: 2020-11-16 | Stop reason: HOSPADM

## 2020-11-15 RX ORDER — SEVELAMER CARBONATE 800 MG/1
3200 TABLET, FILM COATED ORAL 3 TIMES DAILY
Status: DISCONTINUED | OUTPATIENT
Start: 2020-11-15 | End: 2020-11-16 | Stop reason: HOSPADM

## 2020-11-15 RX ORDER — ACETAMINOPHEN 650 MG/1
650 SUPPOSITORY RECTAL EVERY 6 HOURS PRN
Status: DISCONTINUED | OUTPATIENT
Start: 2020-11-15 | End: 2020-11-16 | Stop reason: HOSPADM

## 2020-11-15 RX ORDER — IPRATROPIUM BROMIDE AND ALBUTEROL SULFATE 2.5; .5 MG/3ML; MG/3ML
3 SOLUTION RESPIRATORY (INHALATION) EVERY 6 HOURS PRN
Status: DISCONTINUED | OUTPATIENT
Start: 2020-11-15 | End: 2020-11-16 | Stop reason: HOSPADM

## 2020-11-15 RX ADMIN — MIDODRINE HYDROCHLORIDE 10 MG: 5 TABLET ORAL at 16:46

## 2020-11-15 RX ADMIN — MIDODRINE HYDROCHLORIDE 10 MG: 5 TABLET ORAL at 12:05

## 2020-11-15 RX ADMIN — ASPIRIN 162 MG: 81 TABLET, COATED ORAL at 09:46

## 2020-11-15 RX ADMIN — HEPARIN SODIUM 5000 UNITS: 5000 INJECTION INTRAVENOUS; SUBCUTANEOUS at 05:48

## 2020-11-15 RX ADMIN — Medication 10 ML: at 09:47

## 2020-11-15 RX ADMIN — DOCUSATE SODIUM 100 MG: 100 CAPSULE, LIQUID FILLED ORAL at 21:27

## 2020-11-15 RX ADMIN — MIDODRINE HYDROCHLORIDE 10 MG: 5 TABLET ORAL at 09:50

## 2020-11-15 RX ADMIN — SEVELAMER CARBONATE 3200 MG: 800 TABLET, FILM COATED ORAL at 21:27

## 2020-11-15 RX ADMIN — SEVELAMER CARBONATE 3200 MG: 800 TABLET, FILM COATED ORAL at 09:44

## 2020-11-15 RX ADMIN — SEVELAMER CARBONATE 3200 MG: 800 TABLET, FILM COATED ORAL at 16:46

## 2020-11-15 RX ADMIN — ONDANSETRON 4 MG: 2 INJECTION INTRAMUSCULAR; INTRAVENOUS at 18:18

## 2020-11-15 RX ADMIN — HEPARIN SODIUM 5000 UNITS: 5000 INJECTION INTRAVENOUS; SUBCUTANEOUS at 21:28

## 2020-11-15 RX ADMIN — CITALOPRAM HYDROBROMIDE 40 MG: 20 TABLET ORAL at 09:46

## 2020-11-15 RX ADMIN — Medication 10 ML: at 21:29

## 2020-11-15 RX ADMIN — ACETAMINOPHEN 650 MG: 325 TABLET ORAL at 05:48

## 2020-11-15 RX ADMIN — DOCUSATE SODIUM 100 MG: 100 CAPSULE, LIQUID FILLED ORAL at 09:44

## 2020-11-15 RX ADMIN — AMIODARONE HYDROCHLORIDE 100 MG: 200 TABLET ORAL at 09:45

## 2020-11-15 RX ADMIN — TRAZODONE HYDROCHLORIDE 150 MG: 50 TABLET ORAL at 21:27

## 2020-11-15 RX ADMIN — ATORVASTATIN CALCIUM 40 MG: 40 TABLET, FILM COATED ORAL at 09:44

## 2020-11-15 ASSESSMENT — PAIN DESCRIPTION - LOCATION
LOCATION: HEAD
LOCATION: CHEST
LOCATION: CHEST
LOCATION: HEAD;SHOULDER

## 2020-11-15 ASSESSMENT — PAIN DESCRIPTION - PROGRESSION: CLINICAL_PROGRESSION: GRADUALLY WORSENING

## 2020-11-15 ASSESSMENT — PAIN DESCRIPTION - ORIENTATION
ORIENTATION: MID;OUTER
ORIENTATION: MID;LEFT;OUTER

## 2020-11-15 ASSESSMENT — PAIN SCALES - GENERAL
PAINLEVEL_OUTOF10: 0
PAINLEVEL_OUTOF10: 5
PAINLEVEL_OUTOF10: 6
PAINLEVEL_OUTOF10: 0
PAINLEVEL_OUTOF10: 5

## 2020-11-15 ASSESSMENT — PAIN DESCRIPTION - FREQUENCY: FREQUENCY: CONTINUOUS

## 2020-11-15 ASSESSMENT — PAIN DESCRIPTION - PAIN TYPE
TYPE: ACUTE PAIN
TYPE: ACUTE PAIN

## 2020-11-15 ASSESSMENT — PAIN DESCRIPTION - ONSET: ONSET: ON-GOING

## 2020-11-15 ASSESSMENT — HEART SCORE: ECG: 0

## 2020-11-15 ASSESSMENT — PAIN DESCRIPTION - DESCRIPTORS
DESCRIPTORS: ACHING
DESCRIPTORS: ACHING

## 2020-11-15 NOTE — PROGRESS NOTES
Patient admitted to room 215 from ED. Patient oriented to room, call light, bed rails, phone, lights and bathroom. Patient instructed about the schedule of the day including: vital sign frequency, lab draws, possible tests, frequency of MD and staff rounds, including RN/MD rounding together at bedside, daily weights, and I &O's. Patient instructed about prescribed diet, how to use 8MENU, and television. bed alarm in place, patient aware of placement and reason. Telemetry box 20 in place, patient aware of placement and reason. Bed locked, in lowest position, side rails up 2/4, call light within reach. Will continue to monitor. 4 Eyes Skin Assessment     The patient is being assess for  Admission    I agree that 2 RN's have performed a thorough Head to Toe Skin Assessment on the patient. ALL assessment sites listed below have been assessed. Areas assessed by both nurses: Katheran Knee  [x]   Head, Face, and Ears   [x]   Shoulders, Back, and Chest  [x]   Arms, Elbows, and Hands   [x]   Coccyx, Sacrum, and Ischum  [x]   Legs, Feet, and Heels        Does the Patient have Skin Breakdown?   No         Damian Prevention initiated:  No   Wound Care Orders initiated:  No      [facility] nurse consulted for Pressure Injury (Stage 3,4, Unstageable, DTI, NWPT, and Complex wounds):  No      Nurse 1 eSignature: Electronically signed by Odalys Lewis RN on 11/15/20 at 7:15 AM EST    **SHARE this note so that the co-signing nurse is able to place an eSignature**    Nurse 2 eSignature: Electronically signed by Niru Quesada RN on 11/15/20 at 11:24 PM EST

## 2020-11-15 NOTE — ED PROVIDER NOTES
Emergency Department Provider Note     Location: [facility] A2 CARD TELEMETRY  11/15/2020    I independently performed a history and physical on [facility]. All diagnostic, treatment, and disposition decisions were made by myself in conjunction with the mid-level provider. Briefly, this is a 56 y.o. male here for shortness of breath and chest pain. Patient had dialysis on Friday and feels though that he took enough fluid off. He felt extremely short of breath tonight so called EMS. ED Triage Vitals [11/15/20 0101]   BP Temp Temp Source Pulse Resp SpO2 Height Weight   131/60 97.8 °F (36.6 °C) Oral 70 16 99 % 5' 10\" (1.778 m) 235 lb (106.6 kg)        Patient resting comfortably in no acute distress. Heart is regular rate and rhythm. Lungs with crackles in the bases bilaterally. Abdomen is soft, nondistended, and nontender. Patient seen and examined. Vital signs stable and within normal limits. Physical exam as documented above. Patient feels volume overloaded and there are findings to support that conclusion. Consulted family medicine service and they decided to admit patient for further management. EKG  The Ekg interpreted by me in the absence of a cardiologist shows. Normal sinus rhythm, rate 63, normal intervals, no STEMI, similar to previous EKG dated October 27, 2020      Xr Chest Portable    Result Date: 11/15/2020  EXAMINATION: ONE XRAY VIEW OF THE CHEST 11/15/2020 1:25 am COMPARISON: 10/26/2020. HISTORY: ORDERING SYSTEM PROVIDED HISTORY: sob TECHNOLOGIST PROVIDED HISTORY: Reason for exam:->sob Reason for Exam: sob Acuity: Unknown Type of Exam: Unknown FINDINGS: Cardiomegaly. Mild perihilar congestion. Bibasilar airspace opacities and pleural effusions significant changed. Biapical opacities noted no pneumothorax. Left-sided pleural thickening with multiple remote left-sided rib fractures.      Bibasilar opacities suggestive of pleural effusions and airspace disease Cardiomegaly with perihilar congestion. Biapical opacities noted.   Consider lordotic views         Results for orders placed or performed during the hospital encounter of 11/15/20   Brain Natriuretic Peptide   Result Value Ref Range    Pro-BNP 6,521 (H) 0 - 124 pg/mL   CBC Auto Differential   Result Value Ref Range    WBC 8.4 4.0 - 11.0 K/uL    RBC 4.00 (L) 4.20 - 5.90 M/uL    Hemoglobin 12.1 (L) 13.5 - 17.5 g/dL    Hematocrit 37.3 (L) 40.5 - 52.5 %    MCV 93.3 80.0 - 100.0 fL    MCH 30.2 26.0 - 34.0 pg    MCHC 32.4 31.0 - 36.0 g/dL    RDW 15.8 (H) 12.4 - 15.4 %    Platelets 617 532 - 120 K/uL    MPV 7.3 5.0 - 10.5 fL    Neutrophils % 66.7 %    Lymphocytes % 14.4 %    Monocytes % 9.4 %    Eosinophils % 8.5 %    Basophils % 1.0 %    Neutrophils Absolute 5.6 1.7 - 7.7 K/uL    Lymphocytes Absolute 1.2 1.0 - 5.1 K/uL    Monocytes Absolute 0.8 0.0 - 1.3 K/uL    Eosinophils Absolute 0.7 (H) 0.0 - 0.6 K/uL    Basophils Absolute 0.1 0.0 - 0.2 K/uL   Troponin   Result Value Ref Range    Troponin 0.02 (H) <0.01 ng/mL   Comprehensive Metabolic Panel w/ Reflex to MG   Result Value Ref Range    Sodium 129 (L) 136 - 145 mmol/L    Potassium reflex Magnesium 4.5 3.5 - 5.1 mmol/L    Chloride 92 (L) 99 - 110 mmol/L    CO2 28 21 - 32 mmol/L    Anion Gap 9 3 - 16    Glucose 103 (H) 70 - 99 mg/dL    BUN 37 (H) 7 - 20 mg/dL    CREATININE 6.4 (HH) 0.8 - 1.3 mg/dL    GFR Non-African American 9 (A) >60    GFR  11 (A) >60    Calcium 9.9 8.3 - 10.6 mg/dL    Total Protein 6.9 6.4 - 8.2 g/dL    Alb 4.0 3.4 - 5.0 g/dL    Albumin/Globulin Ratio 1.4 1.1 - 2.2    Total Bilirubin <0.2 0.0 - 1.0 mg/dL    Alkaline Phosphatase 110 40 - 129 U/L    ALT 10 10 - 40 U/L    AST 23 15 - 37 U/L    Globulin 2.9 g/dL   EKG 12 Lead   Result Value Ref Range    Ventricular Rate 63 BPM    Atrial Rate 63 BPM    P-R Interval 146 ms    QRS Duration 84 ms    Q-T Interval 446 ms    QTc Calculation (Bazett) 456 ms    P Axis 28 degrees    R Axis 17 degrees    T Axis 23 degrees    Diagnosis       Normal sinus rhythmNormal ECGNo previous ECGs available       For further details of [facility] emergency department encounter, please see Neptali Jennifer Berrios's documentation. This chart was generated in part by using Dragon Dictation system and may contain errors related to that system including errors in grammar, punctuation, and spelling, as well as words and phrases that may be inappropriate. If there are any questions or concerns please feel free to contact the dictating provider for clarification.            Jj Wallace MD  11/15/20 9539
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
## 2020-11-15 NOTE — PROGRESS NOTES
Inpatient Family Medicine   Progress Note      PCP: Marisol Barry    Date of Admission: 11/15/2020    Chief Complaint:   SOB x 1 day    Hospital Course:     56 y.o. male [address] PMH significant for ESRD on HD, paroxysmal afib (not on anticoagulation due to bleeding from HD site, s/p watchman), DM (diet controlled), COPD on 2L of O2 at baseline, CHF, CAD, HTN, and impaired vision who presented to [facility] with shortness of breath which started earlier today. Pt states he had dialysis on Friday and they only took off a little over a liter because he became hypotensive. Typically he has 4L removed. He has midodrine which he did take before dialysis and after dialysis, but states it doesn't seem to help his BP much during dialysis. He noticed earlier today that he was becoming SOB, especially with walking to the bathroom. He needed up having to increase his O2 from 2L to 3L. He also notes 5/10 left sided chest pain, that is mostly achey occasionally sharp. It is worse with movement of his left arm, and radiates up into his shoulder and neck. He denies any headache, fever, chills, N/V, abd pain, dysuria, diarrhea, and constipation.     Lives at home with his step daughter. Ambulates with a walker or cane at home.     In the ED:     BP:108-131/ 58-62, T: 97.8, P: 70, O2 100 on 3L nC     -Cr 6.4  -Trop 0.02, this is below his baseline of 0.04  -Pro BNP 6521, slightly elevated from last visit at 4000, however pt has been [id number] in the past  -WBC 8.4  -Hgb 12.1appears to be at baseline  -CXR: bibasilar opacities suggestive of BL pleural effusions and airspace disease, cardiomegaly with perihilar congestion    Subjective:   Patient resting comfortably in bed. Reports slight improvement in shortness of breath. He states he is continuing to have chest pain at rest. He states the pain is worse when he moves his left had. He is left handed but does not recall any injury.  Reports that he is blind, but can see objects that are inches from is face. Denies, any fevers, chills, or night sweats. Medications:  Reviewed    Infusion Medications   Scheduled Medications    nitroglycerin  0.5 inch Topical Once    amiodarone  100 mg Oral Daily    aspirin EC  162 mg Oral Daily    atorvastatin  40 mg Oral Daily    citalopram  40 mg Oral Daily    docusate sodium  100 mg Oral BID    midodrine  10 mg Oral TID WC    sevelamer  3,200 mg Oral TID    traZODone  150 mg Oral Nightly    sodium chloride flush  10 mL Intravenous 2 times per day    heparin (porcine)  5,000 Units Subcutaneous 3 times per day     PRN Meds: ipratropium-albuterol, sodium chloride flush, acetaminophen **OR** acetaminophen, promethazine **OR** ondansetron      Intake/Output Summary (Last 24 hours) at 11/15/2020 1000  Last data filed at 11/15/2020 0947  Gross per 24 hour   Intake 490 ml   Output 0 ml   Net 490 ml       Physical Exam Performed:    /71   Pulse 61   Temp 98 °F (36.7 °C) (Oral)   Resp 20   Ht 5' 10\" (1.778 m)   Wt 252 lb 6.4 oz (114.5 kg)   SpO2 100%   BMI 36.22 kg/m²     Physical Exam  Constitutional:       Appearance: Normal appearance. HENT:      Head: Normocephalic and atraumatic. Cardiovascular:      Rate and Rhythm: Normal rate and regular rhythm. Pulses: Normal pulses. Heart sounds: Normal heart sounds. Pulmonary:      Effort: Pulmonary effort is normal.      Breath sounds: Normal breath sounds. Abdominal:      General: There is distension. Tenderness: There is no abdominal tenderness. Musculoskeletal:      Left shoulder: He exhibits tenderness. Arms:    Skin:     General: Skin is warm and dry. Neurological:      Mental Status: He is alert and oriented to person, place, and time.    Psychiatric:         Mood and Affect: Mood normal.         Labs:   Recent Labs     11/15/20  0110 11/15/20  0658   WBC 8.4 6.8   HGB 12.1* 11.5*   HCT 37.3* 35.1*    201     Recent Labs 11/15/20  0110 11/15/20  0658   * 134*   K 4.5 4.4   CL 92* 97*   CO2 28 26   BUN 37* 40*   CREATININE 6.4* 6.9*   CALCIUM 9.9 9.5     Recent Labs     11/15/20  0110 11/15/20  0658   AST 23 14*   ALT 10 8*   BILITOT <0.2 <0.2   ALKPHOS 110 96     No results for input(s): INR in the last 72 hours. Recent Labs     11/15/20  0110 11/15/20  0658   TROPONINI 0.02* 0.02*       Urinalysis:      Lab Results   Component Value Date    NITRU Negative 03/30/2015    WBCUA >100 03/30/2015    BACTERIA 4+ 09/16/2010    RBCUA >100 03/30/2015    BLOODU LARGE 03/30/2015    SPECGRAV 1.020 03/30/2015    GLUCOSEU 100 03/30/2015    GLUCOSEU 100 09/16/2010       Radiology:  XR CHEST PORTABLE   Final Result   Bibasilar opacities suggestive of pleural effusions and airspace disease      Cardiomegaly with perihilar congestion. Biapical opacities noted.   Consider lordotic views                 Assessment/Plan:    Active Hospital Problems    Diagnosis Date Noted    Shortness of breath [R06.02] 11/15/2020    Atypical chest pain [R07.89] 10/26/2020    Hypotension [I95.9] 08/01/2020    DM2 (diabetes mellitus, type 2) ([facility] [E11.9] 12/30/2017    End-[facility] disease [facility] [N18.6, Z99.2] 03/27/2015     SOB  -Baseline O2 is 2L, currently requiring 3L  -Likely 2/2 to fluid overload in the setting of CKD and diastolic heart failure  -Cr 6.4,Trop 0.02, this is below his baseline of 0.04  -Pro BNP 6521, slightly elevated from last visit at 4000, however pt has been [id number] during exacerbations in the past  -CXR: bibasilar opacities suggestive of BL pleural effusions and airspace disease, cardiomegaly with perihilar congestion  -O2 as needed  -Nebulizer prn  -Incentive spirometry  -nephrology consulted  -Plan to dialyze on today        Chest pain  -has had intermittent chest pain since his last admission, this time seems worse with movement of arm - atypical  -Had cardiac workup during his last visit  -Stress test 10/27/2020: normal perfusion with normal LV function and wall motion  -Echo 10/28/2020: LVEF 55%, no wall motion abnormalities, normal ventricular diastolic filling pressure  -Pro BNP 6521, slightly elevated from last visit at 4000, however pt has been [id number] in the past  -Lipid panel completed 10/27/2020- LDL 50  -Troponin 0.02--> 0.02-->0.02   -EKG showed normal sinus rhythm   - OnTele  -Continue home Aspirin, statin  - Currently on 2.5L       ESRD on dialysis  -Appears to have fluid overload since he was only able to tolerate having 1L removed on Friday due to hypotension,   -Plan to dialyze on Sunday  -Nephro consulted    Hypotension  -During dialysis and sometimes after  -Continue home med midodrine     DM  -HgbA1c on 10/27/2020 was 5.8       DVT Prophylaxis: Heprin  Diet: DIET RENAL; Carb Control: 4 carb choices (60 gms)/meal; No Added Salt (3-4 GM)  Code Status: Full Code  PT/OT Eval Status: Not consulted    Dispo -  Pending dialysis and improvement of shortness of breath    This patient was seen and evaluated with the resident team and attending, Dr. Guru Reddy. Joelle Guardado D.O.   [facility] [address] [facility] Resident  PGY-2  (available by [facility])
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
## 2020-11-15 NOTE — PROGRESS NOTES
RESPIRATORY THERAPY ASSESSMENT    Name:  Ervin Luevano Record Number:  [id number]  Age: 56 y.o. Gender: male  : 1960  Today's Date:  11/15/2020  Room:  0215/0215-01    Assessment     Is the patient being admitted for a COPD or Asthma exacerbation? No   (If yes the patient will be seen every 4 hours for the first 24 hours and then reassessed)    Patient Admission Diagnosis      Allergies  Allergies   Allergen Reactions    Bee Pollen     Codeine Swelling    Rosiglitazone      Other reaction(s): Congestive heart failure       Minimum Predicted Vital Capacity:     NA          Actual Vital Capacity:      NA              Pulmonary History:CHF/Pulmonary Edema  Home Oxygen Therapy:  3 liters/min via nasal cannula  Home Respiratory Therapy:Albuterol/Ipratropium Bromide HHN PRN  Current Respiratory Therapy:  Duoneb HHN PRN          Respiratory Severity Index(RSI)   Patients with orders for inhalation medications, oxygen, or any therapeutic treatment modality will be placed on Respiratory Protocol. They will be assessed with the first treatment and at least every 72 hours thereafter. The following severity scale will be used to determine frequency of treatment intervention. Smoking History: Smoking History Less than 1ppd or less than 15 pack year = 1    Social History  Social History     Tobacco Use    Smoking status: Former Smoker     Packs/day: 1.50     Years: 3.00     Pack years: 4.50     Types: Cigarettes     Last attempt to quit: 1977     Years since quittin.6    Smokeless tobacco: Never Used   Substance Use Topics    Alcohol use: No     Alcohol/week: 0.0 standard drinks    Drug use: No       Recent Surgical History: None = 0  Past Surgical History  Past Surgical History:   Procedure Laterality Date    BRONCHOSCOPY  10/10/2012    COLONOSCOPY  2012    3 cold snared polyps, esophagus BX for Barretts.     DIALYSIS FISTULA CREATION Right     INSERTABLE CARDIAC MONITOR 02/21/2018    WATCHMAN PROCEDURE WITH SWETHA    THORACENTESIS      THORACOSCOPY Right 02/18/2013    with biopsy    TOE AMPUTATION Right 4/17/2020    AMPUTATION OF RIGHT GREAT TOE performed by Umu Mays DPM at [address] [place] [address] TRANSESOPHAGEAL ECHOCARDIOGRAM  04/13/2018    Watchman in good place       Level of Consciousness: Alert, Oriented, and Cooperative = 0    Level of Activity: Walking with assistance = 1    Respiratory Pattern: Regular Pattern; RR 8-20 = 0    Breath Sounds: Diminshed bilaterally and/or crackles = 2    Sputum   ,  , Sputum How Obtained: Cough on request  Cough: Strong, spontaneous, non-productive = 0    Vital Signs   /71   Pulse 61   Temp 98 °F (36.7 °C) (Oral)   Resp 20   Ht 5' 10\" (1.778 m)   Wt 252 lb 6.4 oz (114.5 kg)   SpO2 100%   BMI 36.22 kg/m²   SPO2 (COPD values may differ): 88-89% on room air or greater than 92% on FiO2 28- 35% = 2    Peak Flow (asthma only): not applicable = 0    RSI: 5-6 = Q4hr PRN (every four hours as needed) for dyspnea        Plan       Goals: medication delivery, mobilize retained secretions, volume expansion and improve oxygenation    Patient/caregiver was educated on the proper method of use for Respiratory Care Devices:  No: NA      Level of patient/caregiver understanding able to:   ? Verbalize understanding   ? Demonstrate understanding       ? Teach back        ? Needs reinforcement       ? No available caregiver               ? Other:     Response to education:  NA     Is patient being placed on Home Treatment Regimen? Yes     Does the patient have everything they need prior to discharge? NA     Comments: meds and chart reviewed    Plan of Care: Patient is on home treatment regimen. No further assessment needed unless patient condition changes. Electronically signed by Manju Caraballo RCP on 11/15/2020 at 11:58 AM    Respiratory Protocol Guidelines     1.  Assessment and treatment by Respiratory Therapy will be initiated for medication and therapeutic interventions upon initiation of aerosolized medication. 2. Physician will be contacted for respiratory rate (RR) greater than 35 breaths per minute. Therapy will be held for heart rate (HR) greater than 140 beats per minute, pending direction from physician. 3. Bronchodilators will be administered via Metered Dose Inhaler (MDI) with spacer when the following criteria are met:  a. Alert and cooperative     b. HR < 140 bpm  c. RR < 30 bpm                d. Can demonstrate a 2-3 second inspiratory hold  4. Bronchodilators will be administered via Hand Held Nebulizer [facility]) to patients when ANY of the following criteria are met  a. Incognizant or uncooperative          b. Patients treated with HHN at Home        c. Unable to demonstrate proper use of MDI with spacer     d. RR > 30 bpm   5. Bronchodilators will be delivered via Metered Dose Inhaler (MDI), HHN, Aerogen to intubated patients on mechanical ventilation. 6. Inhalation medication orders will be delivered and/or substituted as outlined below. Aerosolized Medications Ordering and Administration Guidelines:    1. All Medications will be ordered by a physician, and their frequency and/or modality will be adjusted as defined by the patients Respiratory Severity Index (RSI) score. 2. If the patient does not have documented COPD, consider discontinuing anticholinergics when RSI is less than 9.  3. If the bronchospasm worsens (increased RSI), then the bronchodilator frequency can be increased to a maximum of every 4 hours. If greater than every 4 hours is required, the physician will be contacted. 4. If the bronchospasm improves, the frequency of the bronchodilator can be decreased, based on the patient's RSI, but not less than home treatment regimen frequency.   5. Bronchodilator(s) will be discontinued if patient has a RSI less than 9 and has received no scheduled or as needed treatment for 72 Hrs.    Patients Ordered on a Mucolytic Agent:    1. Must always be administered with a bronchodilator. 2. Discontinue if patient experiences worsened bronchospasm, or secretions have lessened to the point that the patient is able to clear them with a cough. Anti-inflammatory and Combination Medications:    1. If the patient lacks prior history of lung disease, is not using inhaled anti-inflammatory medication at home, and lacks wheezing by examination or by history for at least 24 hours, contact physician for possible discontinuation.

## 2020-11-15 NOTE — ED NOTES
Bed: 15  Expected date: 11/15/20  Expected time:   Means of arrival:   Comments:  [address], [facility]  11/15/20 0105
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
## 2020-11-15 NOTE — PROGRESS NOTES
[facility]    Attempted to call pts step daughter, apparently it's the wrong phone number. Pt called his sister, Roni Wolf, with no answer so he left a message. No safe means of transportation for discharge at this time.

## 2020-11-15 NOTE — ED NOTES
[facility] @ 0205   Re: family medicine resident  Dr. Rebecca Cortez @ [address] [facility]  11/15/20 0442

## 2020-11-15 NOTE — PROGRESS NOTES
HD treatment 3 hrs. completed, tolerated well. VSS, all blood returned, 2900 ml fluid removed. Post wt. 110.9 Kg. Manual pressure to AVF sites , bleed time less than 5 min. Sites dressed clean, dry intact. Report to Lamine Chris RN, pt returned to room by transport.

## 2020-11-16 VITALS
WEIGHT: 241.18 LBS | SYSTOLIC BLOOD PRESSURE: 93 MMHG | HEART RATE: 74 BPM | BODY MASS INDEX: 34.53 KG/M2 | TEMPERATURE: 98.7 F | DIASTOLIC BLOOD PRESSURE: 51 MMHG | HEIGHT: 70 IN | OXYGEN SATURATION: 99 % | RESPIRATION RATE: 16 BRPM

## 2020-11-16 PROCEDURE — 96372 THER/PROPH/DIAG INJ SC/IM: CPT

## 2020-11-16 PROCEDURE — G0378 HOSPITAL OBSERVATION PER HR: HCPCS

## 2020-11-16 PROCEDURE — 94761 N-INVAS EAR/PLS OXIMETRY MLT: CPT

## 2020-11-16 PROCEDURE — P9047 ALBUMIN (HUMAN), 25%, 50ML: HCPCS

## 2020-11-16 PROCEDURE — 2700000000 HC OXYGEN THERAPY PER DAY

## 2020-11-16 PROCEDURE — 6360000002 HC RX W HCPCS: Performed by: STUDENT IN AN ORGANIZED HEALTH CARE EDUCATION/TRAINING PROGRAM

## 2020-11-16 PROCEDURE — 6370000000 HC RX 637 (ALT 250 FOR IP): Performed by: STUDENT IN AN ORGANIZED HEALTH CARE EDUCATION/TRAINING PROGRAM

## 2020-11-16 PROCEDURE — 6360000002 HC RX W HCPCS

## 2020-11-16 PROCEDURE — 90935 HEMODIALYSIS ONE EVALUATION: CPT

## 2020-11-16 RX ORDER — ALBUMIN (HUMAN) 12.5 G/50ML
SOLUTION INTRAVENOUS
Status: COMPLETED
Start: 2020-11-16 | End: 2020-11-16

## 2020-11-16 RX ORDER — ALBUMIN (HUMAN) 12.5 G/50ML
25 SOLUTION INTRAVENOUS
Status: COMPLETED | OUTPATIENT
Start: 2020-11-16 | End: 2020-11-16

## 2020-11-16 RX ADMIN — ALBUMIN (HUMAN) 25 G: 0.25 INJECTION, SOLUTION INTRAVENOUS at 10:56

## 2020-11-16 RX ADMIN — MIDODRINE HYDROCHLORIDE 10 MG: 5 TABLET ORAL at 10:39

## 2020-11-16 RX ADMIN — ALBUMIN (HUMAN) 25 G: 12.5 SOLUTION INTRAVENOUS at 10:56

## 2020-11-16 RX ADMIN — HEPARIN SODIUM 5000 UNITS: 5000 INJECTION INTRAVENOUS; SUBCUTANEOUS at 06:26

## 2020-11-16 ASSESSMENT — PAIN SCALES - GENERAL
PAINLEVEL_OUTOF10: 0
PAINLEVEL_OUTOF10: 0

## 2020-11-16 NOTE — PROGRESS NOTES
Patient discharged home with ambulance transporting. Discharge instructions explained and given to patient. IV removed. No complications. Telebox removed and returned. Patient belongings gone over and accounted for. Patient to taken to ambulance via stretcher by ambulance staff.

## 2020-11-16 NOTE — FLOWSHEET NOTE
11/16/20 0837 11/16/20 1220   Vital Signs   Temp 97.7 °F (36.5 °C) 97.9 °F (36.6 °C)   Pulse 68 63   /69 (!) 101/59   Height and Weight   Weight 245 lb (111.1 kg)  (Standing wt pernursing this am) 241 lb 2.9 oz (109.4 kg)   Tx completed with minimum fluid removal. pt SBP stays 70-80 during tx with Midodrine 10mg and Albumin 25gm IV. Net UF 700ml. Post tx VS and pt denied discommfort.

## 2020-11-16 NOTE — DISCHARGE SUMMARY
Inpatient Family Medicine Service Discharge Summary      Patient ID: Vergil Filter    Patient's PCP: Maddi Woods Date: 11/15/2020   Discharge Date:   11/16/2020    Admitting Physician: Dama Prader, DO  Discharge Physician: Adelaida Raya DO     Discharge Diagnoses: Active Hospital Problems    Diagnosis Date Noted    Shortness of breath [R06.02] 11/15/2020    Atypical chest pain [R07.89] 10/26/2020    Hypotension [I95.9] 08/01/2020    DM2 (diabetes mellitus, type 2) ([facility] [address] [E11.9] 12/30/2017    End-stage renal disease on hemodialysis ([facility] [address] [N18.6, Z99.2] 03/27/2015       The patient was seen and examined on day of discharge and this discharge summary is in conjunction with any daily progress note from day of discharge. Hospital Course:     56 y. o. male  with PMH significant for ESRD on HD, paroxysmal afib (not on anticoagulation due to bleeding from HD site, s/p watchman), DM (diet controlled), COPD on 2L of O2 at baseline, CHF, CAD, HTN, and impaired vision who presented to [facility] with shortness of breath which started earlier today.  Pt states he had dialysis on Friday and they only took off a little over a liter because he became hypotensive.  Typically he has 4L removed.  He has midodrine which he did take before dialysis and after dialysis, but states it doesn't seem to help his BP much during dialysis.  He noticed earlier today that he was becoming SOB, especially with walking to the bathroom.  He needed up having to increase his O2 from 2L to 3L.  He also notes 5/10 left sided chest pain, that is mostly achey occasionally sharp.  It is worse with movement of his left arm, and radiates up into his shoulder and neck.  He denies any headache, fever, chills, N/V, abd pain, dysuria, diarrhea, and constipation.   Lives at home with his step daughter.  Ambulates with a walker or cane at home.     In the ED:   BP:108-131/ 58-62, T: 97.8, P: 70, O2 100 on 3L nC  -Cr 6.4  -Trop 0.02, this is below his baseline of 0.04  -Pro BNP 6521, slightly elevated from last visit at 4000, however pt has been [id number] in the past  -WBC 8.4  -Hgb 12.1appears to be at baseline  -CXR: bibasilar opacities suggestive of BL pleural effusions and airspace disease, cardiomegaly with perihilar congestion    On Admission  Patient was admitted for SOB secondary to fluid overload from dialysis and Chest pain. Chest pain was reproducible with palpation and repeat Troponin was below baseline at  0.02. Nephrology was consulted for HD. HD was done 11/15/2020 with goal of 3L UF target to get outpatient .5 kg. Patient normally receives HD on M,W,F. He was not able to return home 11/15/2020 due to transportation issues. Patient He received HD on11/16/2020. Patient was instructed to continue same dialysis schedule. All questions and concerns addressed. Instructed to follow up with nephrology and PCP. He was discharge in stable condition. Physical Exam Performed:     /69   Pulse 68   Temp 97.7 °F (36.5 °C)   Resp 16   Ht 5' 10\" (1.778 m)   Wt 245 lb (111.1 kg) Comment: Standing wt pernursing this am  SpO2 99%   BMI 35.15 kg/m²     Physical Exam  Vitals signs reviewed. Constitutional:       Appearance: Normal appearance. HENT:      Head: Normocephalic and atraumatic. Eyes:      Extraocular Movements: Extraocular movements intact. Pupils: Pupils are equal, round, and reactive to light. Cardiovascular:      Rate and Rhythm: Normal rate and regular rhythm. Pulses: Normal pulses. Heart sounds: Normal heart sounds. Pulmonary:      Effort: Pulmonary effort is normal.      Breath sounds: Normal breath sounds. Abdominal:      General: Abdomen is flat. Palpations: Abdomen is soft. Tenderness: There is no abdominal tenderness. Neurological:      Mental Status: He is alert. Labs:  For convenience and continuity at follow-up the following most recent labs are provided:      CBC:    Lab Results   Component Value Date    WBC 6.8 11/15/2020    HGB 11.5 11/15/2020    HCT 35.1 11/15/2020     11/15/2020       Renal:    Lab Results   Component Value Date     11/15/2020    K 4.4 11/15/2020    CL 97 11/15/2020    CO2 26 11/15/2020    BUN 40 11/15/2020    CREATININE 6.9 11/15/2020    CALCIUM 9.5 11/15/2020    PHOS 4.3 10/28/2020         Significant Diagnostic Studies    Radiology:   XR CHEST PORTABLE   Final Result   Bibasilar opacities suggestive of pleural effusions and airspace disease      Cardiomegaly with perihilar congestion. Biapical opacities noted. Consider lordotic views                Consults:     IP CONSULT TO HOSPITALIST  IP CONSULT TO NEPHROLOGY    Disposition:  Discharged home in stable condition     Condition at Discharge: Stable    Discharge Instructions/Follow-up:   With Nephrology and PCP    Code Status:  Full Code     Activity: activity as tolerated    Diet: regular diet      Discharge Medications:     Current Discharge Medication List           Details   amiodarone (PACERONE) 100 MG tablet TAKE ONE (1) TABLET BY MOUTH DAILY   Qty: 30 tablet, Refills: 2      butalbital-acetaminophen-caffeine (FIORICET, ESGIC) -40 MG per tablet Take 1 tablet by mouth every 4 hours as needed for Headaches  Qty: 30 tablet, Refills: 0      midodrine (PROAMATINE) 10 MG tablet Take 1 tablet by mouth 3 times daily (with meals)  Qty: 90 tablet, Refills: 3      albuterol sulfate HFA (PROVENTIL HFA) 108 (90 Base) MCG/ACT inhaler Inhale 2 puffs into the lungs every 6 hours as needed for Wheezing  Qty: 1 Inhaler, Refills: 0      Chlorpheniramine-DM 4-30 MG TABS Take 1 tablet by mouth 3 times daily as needed (CONGESTION)  Qty: 16 tablet, Refills: 0      Respiratory Therapy Supplies (NEBULIZER COMPRESSOR) KIT 1 kit by Does not apply route once for 1 dose  Qty: 1 kit, Refills: 0      albuterol (PROVENTIL) (2.5 MG/3ML) 0.083% nebulizer solution Take 3 mLs by nebulization every 4 hours as needed for Wheezing or Shortness of Breath  Qty: 25 each, Refills: 0      aspirin EC 81 MG EC tablet Take 2 tablets by mouth daily  Qty: 90 tablet, Refills: 3      atorvastatin (LIPITOR) 40 MG tablet Take 1 tablet by mouth daily  Qty: 30 tablet, Refills: 1      ipratropium-albuterol (DUONEB) 0.5-2.5 (3) MG/3ML SOLN nebulizer solution Inhale 3 mLs into the lungs every 6 hours as needed for Shortness of Breath (dyspnea or wheezes. ) DX PULM HTN I27.20  Qty: 360 mL, Refills: 6      B Complex-C-Folic Acid (TRIPHROCAPS PO) Take by mouth      docusate sodium (COLACE, DULCOLAX) 100 MG CAPS Take 100 mg by mouth 2 times daily      citalopram (CELEXA) 40 MG tablet Take 1 tablet by mouth daily  Qty: 30 tablet, Refills: 0      OXYGEN Inhale 2 L into the lungs continuous With concentrator and portability  Qty: 1 each, Refills: 0      traZODone (DESYREL) 150 MG tablet Take 150 mg by mouth nightly. sevelamer (RENVELA) 800 MG tablet Take 4 tablets by mouth 3 times daily              Time Spent on discharge is more than 45 minutes in the examination, evaluation, counseling and review of medications and discharge plan. This patient was seen and evaluated with resident team and the attending, Dr. Cary Fierro. Ilda Guerrero DO   11/16/2020  Family Medicine Resident PGY 2    Thank you Shad Alvarado for the opportunity to be involved in this patient's care. If you have any questions or concerns please feel free to contact me at [phone].

## 2020-11-16 NOTE — DISCHARGE INSTR - COC
Continuity of Care Form    Patient Name: Rajinder Trotter   :  1960  MRN:  [id number]    Admit date:  11/15/2020  Discharge date:  20    Code Status Order: Full Code   Advance Directives:   [facility] Documentation       Date/Time Healthcare Directive Type of Healthcare Directive Copy in [facility] [address] Agent's Name Healthcare Agent's Phone Number    11/15/20 0531  No, patient does not have an advance directive for healthcare treatment -- -- -- -- --            Admitting Physician:  Jay Wilson DO  PCP: Geetha Vazquez    Discharging Nurse: [facility] Unit/Room#: 0215/0215-01  Discharging Unit Phone Number: [phone]    Emergency Contact:   Extended Emergency Contact Information  Primary Emergency Contact: [facility]  Phone: [phone]  Mobile Phone: [phone]  Relation: Other  Secondary Emergency Contact: Aylin Barrett  Mobile Phone: [phone]  Relation: Brother/Sister  Preferred language: English   needed? No    Past Surgical History:  Past Surgical History:   Procedure Laterality Date    BRONCHOSCOPY  10/10/2012    COLONOSCOPY  2012    3 cold snared polyps, esophagus BX for Barretts.     DIALYSIS FISTULA CREATION Right     INSERTABLE CARDIAC MONITOR  2018    WATCHMAN PROCEDURE WITH SWETHA    THORACENTESIS      THORACOSCOPY Right 2013    with biopsy    TOE AMPUTATION Right 2020    AMPUTATION OF RIGHT GREAT TOE performed by Zenobia Nelson DPM at [address] [place] [address] TRANSESOPHAGEAL ECHOCARDIOGRAM  2018    Watchman in good place       Immunization History:   Immunization History   Administered Date(s) Administered    FLUZONE 3 YEARS AND OVER 10/01/2015    Influenza Vaccine, unspecified formulation 2016    Influenza Virus Vaccine 10/01/2014, 2017    Influenza Whole 10/16/2015    Influenza, Quadv, 6 mo and older, IM, PF (Flulaval, Fluarix) 09/17/2018    Pneumococcal Conjugate 7-valent (Prevnar7) 02/09/2015       Active Problems:  Patient Active Problem List   Diagnosis Code    Recurrent right pleural effusion J90    HLD (hyperlipidemia) E78.5    Blindness of left eye with low vision in contralateral eye H54.40, H54.50    History of anemia due to CKD N18.9, Z86.2    Bilateral LE lymphedema and venous stasis dermatitis I87.2    Paroxysmal atrial fibrillation ([facility]) I48.0    End-stage renal disease on hemodialysis ([facility]) N18.6, Z99.2    DM2 (diabetes mellitus, type 2) ([facility]) E11.9    Chronic diastolic (congestive) heart failure ([facility]) I50.32    Typical atrial flutter ([facility]) I48.3    Suspected sleep apnea R29.818    Renovascular hypertension I15.0    Rosario's esophagus K22.70    CAD (coronary artery disease) I25.10    Chronic hypoxemic respiratory failure on 3 L home O2 J96.11    Depression F32.9    SIRS (systemic inflammatory response syndrome) ([facility]) R65.10    Former smoker Z87.891    Pulmonary edema J81.1    Splenic calcification D73.89    Tricuspid regurgitation I07.1    Leukocytosis D72.829    Hyperglycemia R73.9    Osteomyelitis of great toe of right foot ([facility]) M86.9    Essential hypertension I10    Hypotension I95.9    Migraine headache G43.909    Dizziness R42    ESRD (end stage renal disease) on dialysis ([facility]) N18.6, Z99.2    Atypical chest pain R07.89    Shortness of breath R06.02       Isolation/Infection:   Isolation            No Isolation          Patient Infection Status       Infection Onset Added Last Indicated Last Indicated By Review Planned Expiration Resolved Resolved By    None active    Resolved    MRSA 04/17/20 04/19/20 04/17/20 Culture, Tissue   10/27/20 Lara Ochoa RN    MRSA 04/17/20 04/18/20 04/17/20 Culture, Tissue   04/18/20 Lara Ochoa RN            Nurse Assessment:  Last Vital Signs: /69   Pulse 68   Temp 97.7 °F (36.5 °C)   Resp 16   Ht 5' 10\" (1.778 m)   Wt 245 lb (111.1 kg) Comment: Standing wt pernursing this am  SpO2 99%   BMI 35.15 kg/m²     Last documented pain score (0-10 scale): Pain Level: 0  Last Weight:   Wt Readings from Last 1 Encounters:   11/16/20 245 lb (111.1 kg)     Mental Status:  oriented, alert, coherent, logical, thought processes intact and able to concentrate and follow conversation    IV Access:  - None    Nursing Mobility/ADLs:  Walking   Assisted  Transfer  Assisted  Bathing  Assisted  Dressing  Assisted  Toileting  Assisted  Feeding  Assisted  Med Admin  Assisted  Med Delivery   whole    Wound Care Documentation and Therapy:        Elimination:  Continence:   · Bowel: Yes  · Bladder: Yes  Urinary Catheter: None   Colostomy/Ileostomy/Ileal Conduit: No       Date of Last BM:      Intake/Output Summary (Last 24 hours) at 11/16/2020 1324  Last data filed at 11/16/2020 0222  Gross per 24 hour   Intake 480 ml   Output 0 ml   Net 480 ml     I/O last 3 completed shifts: In: 36 [P.O.:720; I.V.:10]  Out: 0     Safety Concerns: At Risk for Falls and blind    Impairments/Disabilities:      Vision    Nutrition Therapy:  Current Nutrition Therapy:   - Oral Diet:  Carb Control 4 carbs/meal (1800kcals/day), Renal and Low Sodium (3-4gm)    Routes of Feeding: Oral  Liquids: No Restrictions  Daily Fluid Restriction: no  Last Modified Barium Swallow with Video (Video Swallowing Test): not done    Treatments at the Time of Hospital Discharge:   Respiratory Treatments:    Oxygen Therapy:  is on oxygen at 2 L/min per nasal cannula. Ventilator:    - No ventilator support    Rehab Therapies: Nurse, Physical Therapy and Occupational Therapy , MSW  Weight Bearing Status/Restrictions: No weight bearing restirctions  Other Medical Equipment (for information only, NOT a DME order):      Other Treatments:  HOME HEALTH CARE: [facility] [address] [place] [address]  to establish plan of care for patient over 60 day period   Nursing  Initial home SN evaluation visit to occur within 24-48 hours for:  1)  medication management  2)  VS and clinical assessment  3)  S&S chronic disease exacerbation education + when to contact MD/NP  4)  care coordination  Medication Reconciliation during 1st SN visit  PT/OT/Speech   Evaluations in home within 24-48 hours of discharge to include DME and home safety   Frontload therapy 5 days, then 3x a week   OT to evaluate if patient has [address] needs for personal care    evaluation within 24-48 hours to evaluate resources & insurance for potential AL, [place], LTC, and Medicaid options   Palliative Care referral within 5 days of hospital discharge   PCP Visit scheduled within 3 - 7 days of hospital discharge [address] [place] [address] 190 (If patient is agreeable and meets guidelines)      Patient's personal belongings (please select all that are sent with patient):       RN SIGNATURE:  Electronically signed by Aline Brooke RN on 11/16/20 at 3:59 PM EST    CASE MANAGEMENT/SOCIAL WORK SECTION    Inpatient Status Date:      Readmission Risk Assessment Score:  Readmission Risk              Risk of Unplanned Readmission:        0           Discharging to Facility/ Agency   Name:  [facility]    Address: [address] [place] [address], [address] [place] [address]  Phone: [phone]  Fax: [phone]      Dialysis Facility (if applicable)   · Name:  · Address:  · Dialysis Schedule:  · Phone:  · Fax:    / signature: {Esignature:[id number]:::0}    PHYSICIAN SECTION    Prognosis: Fair    Condition at Discharge: Stable    Rehab Potential (if transferring to Rehab): Fair    Recommended Labs or Other Treatments After Discharge:     Physician Certification: I certify the above information and transfer of Braxton Bhandari  is necessary for the continuing treatment of the diagnosis listed and that he requires Home Care for greater 30 days.      Update Admission H&P: No change in H&P    PHYSICIAN SIGNATURE:  Electronically signed by Marian Wood [facility] DO on 11/16/20 at 1:25 PM EST

## 2020-11-16 NOTE — PROGRESS NOTES
Progress Note    HISTORY     CC:   Shortness of breath            We are following for ESRD        Subjective/   HPI:  Patient did not tolerate much fluid removal today. Net negative of 700 cc. This is as dry as we can get him.   He was hypotensive and symptomatic      ROS:  Constitutional:  No fevers, No Chills, + weakness  Cardiovascular:  No palpations, no edema  Respiratory:  No wheezing, no cough  Skin:  No rash, no itching  :  Not making much urine     Social Hx:  No family at bedside     Past Medical and Surgical History:  - Reviewed, no changes     EXAM       Objective/     Vitals:    11/16/20 0543 11/16/20 0816 11/16/20 0837 11/16/20 1220   BP:  (!) 145/84 120/69 (!) 101/59   Pulse:  72 68 63   Resp:  16 16 16   Temp:  97.9 °F (36.6 °C) 97.7 °F (36.5 °C) 97.9 °F (36.6 °C)   TempSrc:  Oral     SpO2:  99%     Weight: 245 lb (111.1 kg)  245 lb (111.1 kg) 241 lb 2.9 oz (109.4 kg)   Height:         24HR INTAKE/OUTPUT:      Intake/Output Summary (Last 24 hours) at 11/16/2020 1424  Last data filed at 11/16/2020 1220  Gross per 24 hour   Intake 1580 ml   Output 1800 ml   Net -220 ml     Constitutional:  NAD, ill appearing   Eyes:  Pupils reactive, sclera clear   Neck:  Normal thyroid, no masses   Cardiovascular:  Regular, no rub  Respiratory:  No distress, no wheezing  Psychiatry:  Flat mood/affect, somnolent   Abdomen: +bs, soft, nt, no masses   Musculoskeletal: No LE edema, no clubbing   Lymphatics:  No LAD in neck, no supraclavicular nodes       MEDICAL DECISION MAKING       Data/  Recent Labs     11/15/20  0110 11/15/20  0658   WBC 8.4 6.8   HGB 12.1* 11.5*   HCT 37.3* 35.1*   MCV 93.3 93.7    201     Recent Labs     11/15/20  0110 11/15/20  0658   * 134*   K 4.5 4.4   CL 92* 97*   CO2 28 26   GLUCOSE 103* 115*   BUN 37* 40*   CREATININE 6.4* 6.9*   LABGLOM 9* 8*   GFRAA 11* 10*       Assessment/     - End stage renal disease - on HD MWF     - Acute pulmonary edema   Back to target weight of 110.5 kg    ? High cardiac output failure related to AVF     - Chest pain - plans per Admitting     - Hypertension - controlled     - Anemia of chronic disease - Hb 11.5, TOBY with HD    Plan/     HD today, he is as dry as he will tolerate. Crit line showed a C curve quickly to start. Patient given IV albumin and midodrine. Only able to get 700 cc UF. [address] [address] for discharge. Would need a flow study the next time he goes to the access center.     -----------------------------  Klaudia Coleman M.D.   [facility]

## 2020-11-16 NOTE — PROGRESS NOTES
Comprehensive Nutrition Assessment    Type and Reason for Visit:  Initial, Positive Nutrition Screen    Nutrition Recommendations/Plan:   1. Continue Renal; Carb Control; SOUMYA diet  2. Monitor nutrition adequacy, pertinent labs, bowel habits, wt changes, and clinical progress      Nutrition Assessment:  Positive screen for poor dentition/missing teeth. Pt admitted with SOB. History of ESRD, DM, CHF, CAD, and HTN. Currently receiving HD. On 2 L oxygen. Pt reports great appetite at time of visit. Ate all of his lunch. Did not get to eat breakfast this morning because he was at dialysis. Currently on Renal; Carb Control; SOUMYA diet. Denied need for nutrition education. Denies any C/S difficulty with missing teeth. Endorses good tolerance to food without abdominal pain or nausea. CBW is 241 lb. States he is usually 230 lb. Denied need for ONS d/t plans for discharge today. Will continue to monitor. Malnutrition Assessment:  Malnutrition Status: At risk for malnutrition (Comment)      Estimated Daily Nutrient Needs:  Energy (kcal):  6365-4527 kcals/day; Weight Used for Energy Requirements:  Ideal(75.45 kg)     Protein (g):   g/day; Weight Used for Protein Requirements:  Ideal(1.2-1.5 g/kg)        Fluid (ml/day):  1 ml/kcal; Method Used for Fluid Requirements:  1 ml/kcal      Nutrition Related Findings:  good appetite, on HD, 134 mmol/L Na, missing teeth      Wounds:  None       Current Nutrition Therapies:    DIET RENAL; Carb Control: 4 carb choices (60 gms)/meal; No Added Salt (3-4 GM)    Anthropometric Measures:  · Height: 5' 10\" (177.8 cm)  · Current Body Weight: 241 lb 2.9 oz (109.4 kg)   · Ideal Body Weight: 166 lbs  · BMI: 34.6  · Adjusted Body Weight:   No Adjustment   · BMI Categories: Obese Class 1 (BMI 30.0-34. 9)       Nutrition Diagnosis:   · Increased nutrient needs related to renal dysfunction as evidenced by dialysis      Nutrition Interventions:   Food and/or Nutrient Delivery:  Continue

## 2020-11-16 NOTE — PROGRESS NOTES
Inpatient Family Medicine   Update Note    Patient returned from dialysis. Improved SOB, currently on 100% O2 on 3L. Patient is medically stable for discharge. Informed by nursing staff, he will not be able to go home today. He is blind, and staying with a relative, who is currently not home. /69   Pulse 69   Temp 97.7 °F (36.5 °C) (Oral)   Resp 14   Ht 5' 10\" (1.778 m)   Wt 252 lb 6.4 oz (114.5 kg)   SpO2 100%   BMI 36.22 kg/m²     General appearance:  NAD, appears stated age, and cooperative. HEENT:  Normal cephalic, atraumatic without obvious deformity. Respiratory:  Normal respiratory effort. CTABL, without Rales/Wheezes/Rhonchi. Cardiovascular:  Regular rate and rhythm with normal S1/S2 without murmurs, rubs or gallops. Plan   Not safe to send patient home with voucher  Will speak to case management for alternative transport tomorrow.       This patient was seen and evaluated with resident team and attending, Dr. Dave Askew DO    Family Medicine Resident PGY 2

## 2020-11-16 NOTE — PROGRESS NOTES
Inpatient Family Medicine Short Progress Note (Chart Update)      Subjective: Saw patient in their hospital room. No new questions or concerns. Patient is otherwise doing well. Physical Exam Performed: Vital signs stable during interview . BP (!) 96/56   Pulse 58   Temp 98.5 °F (36.9 °C) (Axillary)   Resp 18   Ht 5' 10\" (1.778 m)   Wt 252 lb 6.4 oz (114.5 kg)   SpO2 97%   BMI 36.22 kg/m²     General appearance: No apparent distress, appears stated age and cooperative. Assessment/Plan: See Dr. Adriana Kim note for full assessment and plan.         (Please note that portions of this note were completed with a voice recognition program.  Efforts were made to edit the dictations but occasionally words are mis-transcribed.)    Lazarus Trevino DO  11/16/2020  Family Medicine Resident PGY-2  Available via [facility]

## 2020-11-16 NOTE — CARE COORDINATION
Sw made a referral to [facility] as pt feels he would benefit from [address]. He notes that he does not have a phone and his step daughter's phone is not a working number. Pt notes that he is mostly home and would be open to remaining at home on Tuesday or Thursday for a visit. [facility] will check if able to do this.
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
## 2020-11-16 NOTE — CARE COORDINATION
[facility]    Referral received from  to follow for home care services. I will follow for needs, and speak with patient to verify demos. DC order noted, all docs needed have been faxed to [facility] for home care services. Home care to drive by on a Tuesday and Thursday due to no phone communication at this time.     Aubrie Braxton RN, BSN CTN  [facility] [phone]

## 2020-11-16 NOTE — CARE COORDINATION
Sw was asked to arrange transportation for pt to return home. Sw attempted to call pt's family on this day with no answer. Sw left VM with pt's sister. RN assisted with Sw communicating with pt while he was in HD here at [facility]. Pt lives at home and his step daughter lives with him. Pt is active with 2L of O2 with [facility] at home. Pt has a Wheelchair at home with a ramp to enter the home. Lida arranged transportation with [facility] who will pick pt up at 4:00pm.     Pt notes that he can walk, however RN is unclear and we do not have PT/OT notes. It feels safe to keep the medical transport . Sw will assist as able.

## 2020-11-16 NOTE — PROGRESS NOTES
Inpatient Family Medicine   Update Note    Patient resting in bed at dialysis. He states he has improved SOB. He is amenable to going home today after dialysis. Denies any chest pain, fevers or chills. BP (!) 145/84   Pulse 72   Temp 97.9 °F (36.6 °C) (Oral)   Resp 16   Ht 5' 10\" (1.778 m)   Wt 245 lb (111.1 kg)   SpO2 99%   BMI 35.15 kg/m²     General appearance:  NAD, appears stated age, and cooperative. HEENT:  Normal cephalic, atraumatic without obvious deformity. Extra ocular muscles intact. Conjunctivae clear. Respiratory:  Normal respiratory effort. CTABL, without Rales/Wheezes/Rhonchi. Cardiovascular:  Regular rate and rhythm with normal S1/S2 without murmurs, rubs or gallops.     Plan   Discharge after dialysis today  Will discuss with case management for alternative transport home    This patient was seen and evaluated with resident team and attending, Dr. Joni Cormier DO    Family Medicine Resident PGY 2

## 2020-11-18 ENCOUNTER — FOLLOWUP TELEPHONE ENCOUNTER (OUTPATIENT)
Dept: TELEMETRY | Age: 60
End: 2020-11-18

## 2020-11-18 NOTE — TELEPHONE ENCOUNTER
1st Attempt; No Answer- Left HIPAA compliant voicemail with Non-Urgent Heart Failure Resource Line number for call back.     Luciano Haider HF BSN-RN  Heart Failure Navigator  [address]\""  [phone]

## 2020-11-19 NOTE — TELEPHONE ENCOUNTER
2nd Attempt; No Answer- Left HIPAA compliant voicemail with Non-Urgent Heart Failure Resource Line number for call back.     Joshua Arrington HF BSN-RN  Heart Failure Navigator  [address] [place] [address]  [phone]

## 2021-12-14 NOTE — CARE COORDINATION
Due to the fact that [facility] CHARANJIT DAVID RUDI cannot take patient under Medicaid portion of dual [employer] product, they would not be able to start pre-cert until Thursday and patient would inevitably need to stay several extra days that no longer require acute hospital level of care. Call placed to [facility] in admissions for EGS. She states her  has cleared them to accept patients again and she can accept patient and she is starting pre-cert today. Will follow for authorization to facilitate discharge to SNF .
DISCHARGE

## 2022-07-05 NOTE — PROGRESS NOTES
with biopsy    TONSILLECTOMY         Objective:   /60   Pulse 104   Ht 5' 10\" (1.778 m)   Wt 239 lb (108.4 kg)   SpO2 96%   BMI 34.29 kg/m²     Wt Readings from Last 3 Encounters:   01/26/18 239 lb (108.4 kg)   01/22/18 230 lb (104.3 kg)   01/22/18 230 lb (104.3 kg)       Physical Exam:  General: No Respiratory distress, appears well developed and well nourished. Eyes:  Sclera nonicteric  Nose/Sinuses:  negative findings: nose shows no deformity, asymmetry, or inflammation, nasal mucosa normal, septum midline with no perforation or bleeding  Back:  no pain to palpation  Joint:  no active joint inflammation  Musculoskeletal:  negative  Skin:  Warm and dry  Neck:  Negative for JVD and Carotid Bruits. Chest:  Dullness of the right base, respiration easy  Cardiovascular:  Ireg irreg  S2 normal, no murmur, no rub or thrill.   Abdomen:  Soft normal liver and spleen  Extremities:   No edema, clubbing, cyanosis,fistula rt upper extremity  Neuro: intact    Medications:   Outpatient Encounter Prescriptions as of 1/26/2018   Medication Sig Dispense Refill    acetaminophen (TYLENOL) 325 MG tablet Take by mouth every 6 hours as needed for Pain      Calcium Carbonate Antacid (TUMS PO) Take 500 mg by mouth      benzonatate (TESSALON) 100 MG capsule Take 100 mg by mouth 3 times daily as needed for Cough      diltiazem (CARDIZEM CD) 120 MG extended release capsule Take 1 capsule by mouth daily 30 capsule 3    warfarin (COUMADIN) 5 MG tablet rx to monitor, daily INR 30 tablet 3    albuterol sulfate HFA (PROVENTIL HFA) 108 (90 BASE) MCG/ACT inhaler Inhale 2 puffs into the lungs every 4 hours as needed for Wheezing 1 Inhaler 0    cetirizine (ZYRTEC ALLERGY) 10 MG tablet Take 0.5 tablets by mouth daily 20 tablet 1    ipratropium-albuterol (DUONEB) 0.5-2.5 (3) MG/3ML SOLN nebulizer solution Take 3 mLs by nebulization every 6 hours DX empyema J86.9 360 mL 6    Respiratory Therapy Supplies 72 hours. Invalid input(s): AMYLASE,  ALB  LIPID:   Lab Results   Component Value Date    CHOL 207 (H) 10/03/2012    CHOL 205 (H) 07/30/2012    CHOL 236 (H) 09/17/2010     Lab Results   Component Value Date    TRIG 173 (H) 10/03/2012    TRIG 233 (H) 07/30/2012    TRIG 238 (H) 09/17/2010     Lab Results   Component Value Date    HDL 38 (L) 10/03/2012    HDL 42 07/30/2012    HDL 34 (L) 09/17/2010     Lab Results   Component Value Date    LDLCALC 134 (H) 10/03/2012    LDLCALC 116 (H) 07/30/2012    LDLCALC 154 (H) 09/17/2010     Lab Results   Component Value Date    LABVLDL 35 10/03/2012    LABVLDL 47 07/30/2012    LABVLDL 48 09/17/2010     No results found for: CHOLHDLRATIO  PT/INR: No results for input(s): PROTIME, INR in the last 72 hours. A1C:   Lab Results   Component Value Date    LABA1C 5.6 01/02/2018     BNP:  No results for input(s): BNP in the last 72 hours. IMAGING:   Summary SWETHA 1.22.18   Ejection fraction is visually estimated to be 50%.  Mild thickening of leaflets of mitral valve.   Mild mitral regurgitation.   Moderate Bi-atrial enlargement.   Moderate tricuspid regurgitation.   No obvious masses, thrombi, or vegetations are noted.   Anatomy suitable for Watchman   --------------------------------------------------------------  EKG  1.2.18    Final 01/02/2018  4:28 AM 14    Atrial flutter with variable A-V block   Abnormal ECG   When compared with ECG of 30-DEC-2017 11:14,   Atrial flutter has replaced Ectopic atrial rhythm   ST no longer depressed in Inferior leads   Non-specific change in ST segment in Lateral leads   T wave inversion now evident in Inferior leads   Confirmed by [facility] MARIAN TURNER, Ritesh Godfrey ([id number]) on 1/2/2018 8:58:59 AM     Assessment:  1. Typical atrial flutter ([address]    2. Long term current use of anticoagulant but having increasing bleeding issues from fistula due to dialysis   3. Permanent atrial fibrillation ([address]    4. 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 Adequate: hears normal conversation without difficulty

## 2024-06-07 NOTE — PROGRESS NOTES
Attempted to wean off Levophed - with med off MAP<50 - pt with peritoneal diaylsis in place- pt c/o feeling fatigued - levophed restarted at 2mcg with improvement - will cont to monitor closely.   Progress Note    HISTORY     CC:  Dizziness           We are following for ESRD         Subjective/     The patient was seen and examined; he notes migraine HA has resolved. CT scan of head was unrevealing. ROS: Notes no chest pain or dyspnea. Social: No visitors. EXAM       Objective/     Vitals:    08/04/20 1939 08/04/20 2329 08/05/20 0454 08/05/20 0557   BP: 123/62 131/71  128/68   Pulse: 63 71  68   Resp: 16   18   Temp:    98.4 °F (36.9 °C)   TempSrc:    Oral   SpO2: 97%      Weight:   246 lb 12.8 oz (111.9 kg)    Height:         24HR INTAKE/OUTPUT:      Intake/Output Summary (Last 24 hours) at 8/5/2020 0628  Last data filed at 8/5/2020 5289  Gross per 24 hour   Intake 720 ml   Output 0 ml   Net 720 ml     Constitutional: Resting comfortably. HEENT: Eyes: Not icteric. Nose: O2 per NC. Neck:  Trachea midline; no masses   Cardiovascular:  Regular, 1/6 BARON. No rub  Respiratory: Decrease BS R > L anterolateral lung fields. Abdomen: +bs, soft, nt, no masses   Musculoskeletal: No LE edema, no clubbing       MEDICAL DECISION MAKING       Data/  Recent Labs     08/03/20  0731 08/05/20  0400   WBC 7.4 6.5   HGB 12.6* 11.8*   HCT 37.9* 36.4*   MCV 93.7 93.2    237     Recent Labs     08/03/20  0731 08/05/20  0400   * 131*   K 5.1 4.8   CL 95* 93*   CO2 21 24   GLUCOSE 105* 95   PHOS 4.9 5.8*   BUN 83* 59*   CREATININE 9.9* 9.2*   LABGLOM 5* 6*   GFRAA 6* 7*       Assessment/Plan     ESRD:  On HD MWF at [facility]. Has right forearm AVF  - Target of 107.5 kg with average IDWG of 2.5 - 4 kg   - Post-HD wt on 8/3 109.6 kg. UF 1.4 kg - limited by hypotension during treatment. - Use 109 kg as TW (may need to be higher).     Hypotension (Intradialytic). Chronic. On midodrine.     Dizziness:  Seems to be related to low BP.  Increase TW (as above)    Anemia  - Hgb 11.8 - not on TOBY    Renal osteodystrophy  - Ca 8.8 / Pi 5.8  - On high-dose Renvela

## 2025-07-11 NOTE — PROGRESS NOTES
Hospitalist Progress Note  8/1/2020 12:11 PM  Subjective:   Admit Date: 7/31/2020  PCP: Emil Alberto Status: Inpatient [101]  Interval History: Hospital Day: 2, admitted with hypotension while in dialysis, refractory to midodrine. This has been happening more frequently. He was previously taking midodrine on the mornings of hemodialysis. History of present illness:  History of blindness; paroxysmal A. fib; diabetes; and end-stage renal disease on dialysis (Monday Wednesdays and Fridays) who presented to [facility] with dizziness. He describes his dizziness as lightheadedness. Associated with symptoms is shortness of breath. Patient nearly fell but caught himself. His symptoms started after dialysis. After dialysis his systolic blood pressure was noted to be low.   Patient took midodrine  x2 doses before coming to the emergency     DIET RENAL;  Right forearm AV fistula  Left hand peripheral IV (7/31, day #2)  Medications:     aspirin EC  162 mg Oral Daily   atorvastatin  40 mg Oral Daily   citalopram  40 mg Oral Daily   docusate sodium  100 mg Oral BID   fluticasone  1 spray Each Nostril Daily   sevelamer  3,200 mg Oral TID   traZODone  150 mg Oral Nightly   midodrine  10 mg Oral TID WC   heparin   5,000 Units Subcutaneous 3 times per day     Recent Labs     07/31/20 1959   WBC 10.0   HGB 14.7      MCV 95.9     Recent Labs     07/31/20  1924 08/01/20  0455   * 134*   K 4.8 4.3   CL 92* 98*   CO2 25 21   BUN 29* 42*   CREATININE 4.9* 6.5*   GLUCOSE 105* 129*     Troponin T:  0.04 ng/mL  Pro-BNP (7/31) 2,625 pg/mL    Objective:   Vitals:  /60   Pulse 63   Temp 98.2 °F (oral)   Resp 18   Ht 5' 9\"  Wt 110.3 kg  SpO2 100% on 2 LPM   BMI 35.91 kg/m²   General appearance: alert and cooperative with exam  Lungs: clear to auscultation bilaterally  Heart: regular rate and rhythm, S1, S2 normal, no murmur, click, rub or gallop  Abdomen: soft, non-tender; bowel sounds normal; no masses,  no organomegaly  Extremities: right forearm fistula intact, neurovastular intact  Neurologic: No obvious focal neurologic deficits. Assessment and Plan:   1. Hypotension associated with hemodialysis:  Now appears refractory to midodrine. Continues on current dose of midodrine at   2. ESRD requiring hemodialysis for the past 10 years via right forearm AV fistula. Renal failure secondary to diabetes. He does not currently require insulin due to renal failure. He is normally followed by Dr. Marcos Channel. Renal diet. 3. Paroxysmal atrial fibrillation:  Sinus rhythm. Watchman device. Previously anticoagulated with warfarin which was discontinued due to bleeding two years ago. No rate controlling medication required. Continues on aspirin 162 mg daily. 4. Chronic respiratory failure with hypoxia on supplemental oxygen. 5. Type 2 Diabetes (HgbA1c 5.5%): No need for sliding scale insulin. 6. Chronic diastolic heart failure (EF 55% by echo Jan 2019): stable. No ACE inhibitor due to renal failure. 7. Dyslipidemia:  Continues on atorvastatin 40 mg nightly. 8. Depression and insomnia:  Continues on citalopram 40 mg daily and trazodone 150 mg nightly. Advance Directive: Full Code  DVT prophylaxis with heparin 5,000 units sub-Q TID.    Discharge planning: pending nephrology evaluation, likely Sunday, August 2      Bam Baptiste MD  [facility] Hospitalist at home:

## (undated) DEVICE — COVER XR CASS W20XL41IN UNIV ADH STRP

## (undated) DEVICE — SOLUTION IV IRRIG POUR BRL 0.9% SODIUM CHL 2F7124

## (undated) DEVICE — 3M™ COBAN™ SELF-ADHERENT WRAP, 1583S, STERILE, 3 IN X 5 YD (7,5 CM X 4,5 M), 24 ROLLS/CASE: Brand: 3M™ COBAN™

## (undated) DEVICE — SOLUTION IRRIG 2000ML 0.9% SOD CHL USP UROMATIC PLAS CONT

## (undated) DEVICE — TRAY PREP DRY W/ PREM GLV 2 APPL 6 SPNG 2 UNDPD 1 OVERWRAP

## (undated) DEVICE — TUBE IRRIG HNDPC HI FLO TP INTRPULS W/SUCTION TUBE

## (undated) DEVICE — STANDARD HYPODERMIC NEEDLE,POLYPROPYLENE HUB: Brand: MONOJECT

## (undated) DEVICE — SMARTGOWN BREATHABLE SURGICAL GOWN: Brand: CONVERTORS

## (undated) DEVICE — GLOVE ORANGE PI 8   MSG9080

## (undated) DEVICE — GLOVE ORANGE PI 7 1/2   MSG9075

## (undated) DEVICE — PACK EXTREMITY

## (undated) DEVICE — BANDAGE ROLL,100% COTTON, 8 PLY, LARGE: Brand: KERLIX

## (undated) DEVICE — SMARTGOWN SURGICAL GOWN, XL: Brand: CONVERTORS

## (undated) DEVICE — HANDPIECE SET WITH HIGH FLOW TIP AND SUCTION TUBE: Brand: INTERPULSE

## (undated) DEVICE — SUTURE VCRL + SZ 3-0 L27IN ABSRB UD L26MM SH 1/2 CIR VCP416H

## (undated) DEVICE — SUTURE NONABSORBABLE MONOFILAMENT 3-0 PS-1 18 IN BLK ETHILON 1663H